# Patient Record
Sex: MALE | Race: WHITE | NOT HISPANIC OR LATINO | Employment: OTHER | URBAN - NONMETROPOLITAN AREA
[De-identification: names, ages, dates, MRNs, and addresses within clinical notes are randomized per-mention and may not be internally consistent; named-entity substitution may affect disease eponyms.]

---

## 2022-04-04 ENCOUNTER — APPOINTMENT (EMERGENCY)
Dept: CT IMAGING | Facility: HOSPITAL | Age: 82
DRG: 329 | End: 2022-04-04
Payer: MEDICARE

## 2022-04-04 ENCOUNTER — HOSPITAL ENCOUNTER (INPATIENT)
Facility: HOSPITAL | Age: 82
LOS: 7 days | Discharge: HOME HEALTH | DRG: 329 | End: 2022-04-11
Attending: STUDENT IN AN ORGANIZED HEALTH CARE EDUCATION/TRAINING PROGRAM | Admitting: INTERNAL MEDICINE
Payer: MEDICARE

## 2022-04-04 DIAGNOSIS — E43 SEVERE PROTEIN-CALORIE MALNUTRITION (HCC): ICD-10-CM

## 2022-04-04 DIAGNOSIS — K56.609 LARGE BOWEL OBSTRUCTION (HCC): ICD-10-CM

## 2022-04-04 DIAGNOSIS — K63.89 COLONIC MASS: Primary | ICD-10-CM

## 2022-04-04 PROBLEM — D64.9 ANEMIA: Status: ACTIVE | Noted: 2022-04-04

## 2022-04-04 PROBLEM — R79.89 ELEVATED SERUM CREATININE: Status: ACTIVE | Noted: 2022-04-04

## 2022-04-04 LAB
ALBUMIN SERPL BCP-MCNC: 3.2 G/DL (ref 3.5–5)
ALP SERPL-CCNC: 120 U/L (ref 46–116)
ALT SERPL W P-5'-P-CCNC: 17 U/L (ref 12–78)
ANION GAP SERPL CALCULATED.3IONS-SCNC: 9 MMOL/L (ref 4–13)
AST SERPL W P-5'-P-CCNC: 18 U/L (ref 5–45)
BACTERIA UR QL AUTO: ABNORMAL /HPF
BASOPHILS # BLD AUTO: 0.02 THOUSANDS/ΜL (ref 0–0.1)
BASOPHILS NFR BLD AUTO: 0 % (ref 0–1)
BILIRUB SERPL-MCNC: 0.37 MG/DL (ref 0.2–1)
BILIRUB UR QL STRIP: NEGATIVE
BUN SERPL-MCNC: 21 MG/DL (ref 5–25)
CALCIUM ALBUM COR SERPL-MCNC: 9.1 MG/DL (ref 8.3–10.1)
CALCIUM SERPL-MCNC: 8.5 MG/DL (ref 8.3–10.1)
CHLORIDE SERPL-SCNC: 100 MMOL/L (ref 100–108)
CLARITY UR: ABNORMAL
CO2 SERPL-SCNC: 28 MMOL/L (ref 21–32)
COLOR UR: YELLOW
CREAT SERPL-MCNC: 1.39 MG/DL (ref 0.6–1.3)
EOSINOPHIL # BLD AUTO: 0.01 THOUSAND/ΜL (ref 0–0.61)
EOSINOPHIL NFR BLD AUTO: 0 % (ref 0–6)
ERYTHROCYTE [DISTWIDTH] IN BLOOD BY AUTOMATED COUNT: 14.7 % (ref 11.6–15.1)
GFR SERPL CREATININE-BSD FRML MDRD: 47 ML/MIN/1.73SQ M
GLUCOSE SERPL-MCNC: 118 MG/DL (ref 65–140)
GLUCOSE UR STRIP-MCNC: NEGATIVE MG/DL
HCT VFR BLD AUTO: 34.6 % (ref 36.5–49.3)
HGB BLD-MCNC: 11.4 G/DL (ref 12–17)
HGB UR QL STRIP.AUTO: ABNORMAL
IMM GRANULOCYTES # BLD AUTO: 0.04 THOUSAND/UL (ref 0–0.2)
IMM GRANULOCYTES NFR BLD AUTO: 1 % (ref 0–2)
KETONES UR STRIP-MCNC: ABNORMAL MG/DL
LEUKOCYTE ESTERASE UR QL STRIP: NEGATIVE
LIPASE SERPL-CCNC: 91 U/L (ref 73–393)
LYMPHOCYTES # BLD AUTO: 0.43 THOUSANDS/ΜL (ref 0.6–4.47)
LYMPHOCYTES NFR BLD AUTO: 6 % (ref 14–44)
MAGNESIUM SERPL-MCNC: 1.8 MG/DL (ref 1.6–2.6)
MCH RBC QN AUTO: 29.1 PG (ref 26.8–34.3)
MCHC RBC AUTO-ENTMCNC: 32.9 G/DL (ref 31.4–37.4)
MCV RBC AUTO: 88 FL (ref 82–98)
MONOCYTES # BLD AUTO: 0.87 THOUSAND/ΜL (ref 0.17–1.22)
MONOCYTES NFR BLD AUTO: 12 % (ref 4–12)
NEUTROPHILS # BLD AUTO: 5.94 THOUSANDS/ΜL (ref 1.85–7.62)
NEUTS SEG NFR BLD AUTO: 81 % (ref 43–75)
NITRITE UR QL STRIP: POSITIVE
NON-SQ EPI CELLS URNS QL MICRO: ABNORMAL /HPF
NRBC BLD AUTO-RTO: 0 /100 WBCS
PH UR STRIP.AUTO: 7 [PH]
PHOSPHATE SERPL-MCNC: 2.9 MG/DL (ref 2.3–4.1)
PLATELET # BLD AUTO: 291 THOUSANDS/UL (ref 149–390)
PMV BLD AUTO: 8.5 FL (ref 8.9–12.7)
POTASSIUM SERPL-SCNC: 3.5 MMOL/L (ref 3.5–5.3)
PROT SERPL-MCNC: 7.3 G/DL (ref 6.4–8.2)
PROT UR STRIP-MCNC: NEGATIVE MG/DL
RBC # BLD AUTO: 3.92 MILLION/UL (ref 3.88–5.62)
RBC #/AREA URNS AUTO: ABNORMAL /HPF
SODIUM SERPL-SCNC: 137 MMOL/L (ref 136–145)
SP GR UR STRIP.AUTO: 1.01 (ref 1–1.03)
TSH SERPL DL<=0.05 MIU/L-ACNC: 2.97 UIU/ML (ref 0.45–4.5)
UROBILINOGEN UR QL STRIP.AUTO: 0.2 E.U./DL
WBC # BLD AUTO: 7.31 THOUSAND/UL (ref 4.31–10.16)
WBC #/AREA URNS AUTO: ABNORMAL /HPF

## 2022-04-04 PROCEDURE — 81001 URINALYSIS AUTO W/SCOPE: CPT | Performed by: STUDENT IN AN ORGANIZED HEALTH CARE EDUCATION/TRAINING PROGRAM

## 2022-04-04 PROCEDURE — 84100 ASSAY OF PHOSPHORUS: CPT | Performed by: STUDENT IN AN ORGANIZED HEALTH CARE EDUCATION/TRAINING PROGRAM

## 2022-04-04 PROCEDURE — 96365 THER/PROPH/DIAG IV INF INIT: CPT

## 2022-04-04 PROCEDURE — 83550 IRON BINDING TEST: CPT

## 2022-04-04 PROCEDURE — 80053 COMPREHEN METABOLIC PANEL: CPT | Performed by: STUDENT IN AN ORGANIZED HEALTH CARE EDUCATION/TRAINING PROGRAM

## 2022-04-04 PROCEDURE — 99285 EMERGENCY DEPT VISIT HI MDM: CPT

## 2022-04-04 PROCEDURE — 83690 ASSAY OF LIPASE: CPT | Performed by: STUDENT IN AN ORGANIZED HEALTH CARE EDUCATION/TRAINING PROGRAM

## 2022-04-04 PROCEDURE — 83540 ASSAY OF IRON: CPT

## 2022-04-04 PROCEDURE — 82746 ASSAY OF FOLIC ACID SERUM: CPT

## 2022-04-04 PROCEDURE — 82728 ASSAY OF FERRITIN: CPT

## 2022-04-04 PROCEDURE — 99223 1ST HOSP IP/OBS HIGH 75: CPT

## 2022-04-04 PROCEDURE — 93005 ELECTROCARDIOGRAM TRACING: CPT

## 2022-04-04 PROCEDURE — 36415 COLL VENOUS BLD VENIPUNCTURE: CPT | Performed by: STUDENT IN AN ORGANIZED HEALTH CARE EDUCATION/TRAINING PROGRAM

## 2022-04-04 PROCEDURE — 84443 ASSAY THYROID STIM HORMONE: CPT | Performed by: STUDENT IN AN ORGANIZED HEALTH CARE EDUCATION/TRAINING PROGRAM

## 2022-04-04 PROCEDURE — 85025 COMPLETE CBC W/AUTO DIFF WBC: CPT | Performed by: STUDENT IN AN ORGANIZED HEALTH CARE EDUCATION/TRAINING PROGRAM

## 2022-04-04 PROCEDURE — 74177 CT ABD & PELVIS W/CONTRAST: CPT

## 2022-04-04 PROCEDURE — G1004 CDSM NDSC: HCPCS

## 2022-04-04 PROCEDURE — 83735 ASSAY OF MAGNESIUM: CPT | Performed by: STUDENT IN AN ORGANIZED HEALTH CARE EDUCATION/TRAINING PROGRAM

## 2022-04-04 PROCEDURE — 82607 VITAMIN B-12: CPT

## 2022-04-04 PROCEDURE — 1124F ACP DISCUSS-NO DSCNMKR DOCD: CPT | Performed by: STUDENT IN AN ORGANIZED HEALTH CARE EDUCATION/TRAINING PROGRAM

## 2022-04-04 PROCEDURE — 99285 EMERGENCY DEPT VISIT HI MDM: CPT | Performed by: STUDENT IN AN ORGANIZED HEALTH CARE EDUCATION/TRAINING PROGRAM

## 2022-04-04 RX ORDER — SODIUM CHLORIDE, SODIUM GLUCONATE, SODIUM ACETATE, POTASSIUM CHLORIDE, MAGNESIUM CHLORIDE, SODIUM PHOSPHATE, DIBASIC, AND POTASSIUM PHOSPHATE .53; .5; .37; .037; .03; .012; .00082 G/100ML; G/100ML; G/100ML; G/100ML; G/100ML; G/100ML; G/100ML
1000 INJECTION, SOLUTION INTRAVENOUS ONCE
Status: COMPLETED | OUTPATIENT
Start: 2022-04-04 | End: 2022-04-04

## 2022-04-04 RX ORDER — HEPARIN SODIUM 5000 [USP'U]/ML
5000 INJECTION, SOLUTION INTRAVENOUS; SUBCUTANEOUS EVERY 8 HOURS SCHEDULED
Status: DISCONTINUED | OUTPATIENT
Start: 2022-04-05 | End: 2022-04-11 | Stop reason: HOSPADM

## 2022-04-04 RX ORDER — SODIUM CHLORIDE 9 MG/ML
50 INJECTION, SOLUTION INTRAVENOUS CONTINUOUS
Status: DISCONTINUED | OUTPATIENT
Start: 2022-04-05 | End: 2022-04-05

## 2022-04-04 RX ADMIN — IOHEXOL 100 ML: 350 INJECTION, SOLUTION INTRAVENOUS at 21:35

## 2022-04-04 RX ADMIN — SODIUM CHLORIDE, SODIUM GLUCONATE, SODIUM ACETATE, POTASSIUM CHLORIDE, MAGNESIUM CHLORIDE, SODIUM PHOSPHATE, DIBASIC, AND POTASSIUM PHOSPHATE 1000 ML: .53; .5; .37; .037; .03; .012; .00082 INJECTION, SOLUTION INTRAVENOUS at 20:40

## 2022-04-04 NOTE — ED PROVIDER NOTES
History  Chief Complaint   Patient presents with    Loss of Appetite     Pt not eating for the last three weeks because he gets the "dry heaves" every time he eats  Denies CP, SOB, N/V/D and constipation  Pt lives with son  Son states pt sleeps most of the day and is tired all the time  History provided by:  Patient and relative  Malaise - 7 years or greater  Severity:  Moderate  Onset quality:  Gradual  Duration:  3 weeks  Timing:  Constant  Progression:  Worsening  Chronicity:  New  Context: dehydration    Context: not alcohol use, not change in medication, not decreased sleep, not increased activity, not pinched nerve, not recent infection, not stress and not urinary tract infection    Relieved by:  Nothing  Worsened by:  Nothing  Ineffective treatments:  None tried  Associated symptoms: anorexia, lethargy, myalgias, nausea and vomiting    Associated symptoms: no abdominal pain, no arthralgias, no chest pain, no cough, no diarrhea, no difficulty walking, no dizziness, no dysphagia, no dysuria, no falls, no fever, no foul-smelling urine, no frequency, no headaches, no melena, no shortness of breath, no syncope and no urgency       81year old M  No known past medical history  Does not follow with a primary care provider  He states that for the past 3 weeks, he has been having persistent decreased appetite, dry heaving  Patient lives with his son  The patient adds that he has been having worsening weight loss over the last 3 weeks along with worsening fatigue/lethargy  Denies bloody stool, abdominal pain, fever/chills, nasal congestion, sore throat, rhinorrhea, dysuria  The patient's fluid intake and urine output have been poor over the last 3 weeks as well  History reviewed  No pertinent past medical history  Past Surgical History:   Procedure Laterality Date    HERNIA REPAIR         History reviewed  No pertinent family history    I have reviewed and agree with the history as documented  E-Cigarette/Vaping     E-Cigarette/Vaping Substances     Social History     Tobacco Use    Smoking status: Never Smoker    Smokeless tobacco: Never Used   Substance Use Topics    Alcohol use: Not on file    Drug use: Never       Review of Systems   Constitutional: Positive for activity change, appetite change and unexpected weight change  Negative for chills and fever  HENT: Negative for congestion, rhinorrhea, sinus pressure and sinus pain  Respiratory: Negative for cough, chest tightness and shortness of breath  Cardiovascular: Negative for chest pain and syncope  Gastrointestinal: Positive for anorexia, nausea and vomiting  Negative for abdominal pain, diarrhea, dysphagia and melena  Genitourinary: Positive for decreased urine volume  Negative for difficulty urinating, dysuria, flank pain, frequency and urgency  Musculoskeletal: Positive for myalgias  Negative for arthralgias, falls, neck pain and neck stiffness  Skin: Negative for color change, pallor, rash and wound  Neurological: Negative for dizziness, syncope, light-headedness and headaches  Hematological: Does not bruise/bleed easily  Psychiatric/Behavioral: Negative for confusion  All other systems reviewed and are negative  Physical Exam  Physical Exam  Vitals and nursing note reviewed  Constitutional:       General: He is not in acute distress  Appearance: He is not toxic-appearing  Comments: Chronically ill-appearing   HENT:      Head: Normocephalic and atraumatic  Right Ear: External ear normal       Left Ear: External ear normal       Nose: No congestion or rhinorrhea  Mouth/Throat:      Mouth: Mucous membranes are moist       Pharynx: Oropharynx is clear  No oropharyngeal exudate or posterior oropharyngeal erythema  Eyes:      General:         Right eye: No discharge  Left eye: No discharge  Extraocular Movements: Extraocular movements intact        Conjunctiva/sclera: Conjunctivae normal       Pupils: Pupils are equal, round, and reactive to light  Cardiovascular:      Rate and Rhythm: Normal rate  Rhythm irregular  Pulses: Normal pulses  Heart sounds: Normal heart sounds  Pulmonary:      Effort: Pulmonary effort is normal  No respiratory distress  Breath sounds: Normal breath sounds  No stridor  No wheezing, rhonchi or rales  Chest:      Chest wall: No tenderness  Abdominal:      General: Abdomen is flat  Bowel sounds are normal       Palpations: Abdomen is soft  Tenderness: There is abdominal tenderness  There is no right CVA tenderness, left CVA tenderness, guarding or rebound  Comments: Mild tenderness to palpation along the epigastrium  Normoactive bowel sounds  Musculoskeletal:         General: No swelling or tenderness  Normal range of motion  Cervical back: Neck supple  No tenderness  Skin:     General: Skin is warm and dry  Capillary Refill: Capillary refill takes less than 2 seconds  Coloration: Skin is not jaundiced or pale  Findings: No bruising, erythema, lesion or rash  Neurological:      General: No focal deficit present  Mental Status: He is alert and oriented to person, place, and time  Mental status is at baseline  Cranial Nerves: No cranial nerve deficit  Sensory: No sensory deficit  Motor: No weakness  Psychiatric:         Mood and Affect: Mood normal          Behavior: Behavior normal          Thought Content:  Thought content normal          Judgment: Judgment normal        Vital Signs  ED Triage Vitals   Temperature Pulse Respirations Blood Pressure SpO2   04/04/22 1923 04/04/22 1923 04/04/22 1923 04/04/22 1925 04/04/22 1923   98 °F (36 7 °C) 84 16 157/84 100 %      Temp Source Heart Rate Source Patient Position - Orthostatic VS BP Location FiO2 (%)   04/04/22 1923 04/04/22 1923 04/04/22 1923 04/04/22 1923 --   Temporal Monitor Sitting Right arm       Pain Score       04/04/22 1923       No Pain         Vitals:    04/04/22 1923 04/04/22 1925   BP:  157/84   Pulse: 84    Patient Position - Orthostatic VS: Sitting      ED Medications  Medications   multi-electrolyte (ISOLYTE-S PH 7 4) bolus 1,000 mL (0 mL Intravenous Stopped 4/4/22 2202)   iohexol (OMNIPAQUE) 350 MG/ML injection (SINGLE-DOSE) 100 mL (100 mL Intravenous Given 4/4/22 2135)     Diagnostic Studies  Results Reviewed     Procedure Component Value Units Date/Time    Urine Microscopic [[id number]]  (Abnormal) Collected: 04/04/22 2205    Lab Status: Final result Specimen: Urine, Clean Catch Updated: 04/04/22 2238     RBC, UA 10-20 /hpf      WBC, UA None Seen /hpf      Epithelial Cells None Seen /hpf      Bacteria, UA Occasional /hpf     UA w Reflex to Microscopic w Reflex to Culture [[id number]]  (Abnormal) Collected: 04/04/22 2205    Lab Status: Final result Specimen: Urine, Clean Catch Updated: 04/04/22 2222     Color, UA Yellow     Clarity, UA Slightly Cloudy     Specific [place], UA 1 010     pH, UA 7 0     Leukocytes, UA Negative     Nitrite, UA Positive     Protein, UA Negative mg/dl      Glucose, UA Negative mg/dl      Ketones, UA Trace mg/dl      Urobilinogen, UA 0 2 E U /dl      Bilirubin, UA Negative     Blood, UA Small    Comprehensive metabolic panel [[id number]]  (Abnormal) Collected: 04/04/22 2031    Lab Status: Final result Specimen: Blood from Arm, Right Updated: 04/04/22 2102     Sodium 137 mmol/L      Potassium 3 5 mmol/L      Chloride 100 mmol/L      CO2 28 mmol/L      ANION GAP 9 mmol/L      BUN 21 mg/dL      Creatinine 1 39 mg/dL      Glucose 118 mg/dL      Calcium 8 5 mg/dL      Corrected Calcium 9 1 mg/dL      AST 18 U/L      ALT 17 U/L      Alkaline Phosphatase 120 U/L      Total Protein 7 3 g/dL      Albumin 3 2 g/dL      Total Bilirubin 0 37 mg/dL      eGFR 47 ml/min/1 73sq m     Narrative:      [place] guidelines for Chronic Kidney Disease (CKD):     Stage 1 with normal or high GFR (GFR > 90 mL/min/1 73 square meters)    Stage 2 Mild CKD (GFR = 60-89 mL/min/1 73 square meters)    Stage 3A Moderate CKD (GFR = 45-59 mL/min/1 73 square meters)    Stage 3B Moderate CKD (GFR = 30-44 mL/min/1 73 square meters)    Stage 4 Severe CKD (GFR = 15-29 mL/min/1 73 square meters)    Stage 5 End Stage CKD (GFR <15 mL/min/1 73 square meters)  Note: GFR calculation is accurate only with a steady state creatinine    Phosphorus [[id number]]  (Normal) Collected: 04/04/22 2031    Lab Status: Final result Specimen: Blood from Arm, Right Updated: 04/04/22 2102     Phosphorus 2 9 mg/dL     Lipase [[id number]]  (Normal) Collected: 04/04/22 2031    Lab Status: Final result Specimen: Blood from Arm, Right Updated: 04/04/22 2102     Lipase 91 u/L     TSH, 3rd generation with Free T4 reflex [[id number]]  (Normal) Collected: 04/04/22 2031    Lab Status: Final result Specimen: Blood from Arm, Right Updated: 04/04/22 2102     TSH 3RD GENERATON 2 971 uIU/mL     Narrative:      Patients undergoing fluorescein dye angiography may retain small amounts of fluorescein in the body for 48-72 hours post procedure  Samples containing fluorescein can produce falsely depressed TSH values  If the patient had this procedure,a specimen should be resubmitted post fluorescein clearance        Magnesium [[id number]]  (Normal) Collected: 04/04/22 2031    Lab Status: Final result Specimen: Blood from Arm, Right Updated: 04/04/22 2102     Magnesium 1 8 mg/dL     CBC and differential [[id number]]  (Abnormal) Collected: 04/04/22 2031    Lab Status: Final result Specimen: Blood from Arm, Right Updated: 04/04/22 2037     WBC 7 31 Thousand/uL      RBC 3 92 Million/uL      Hemoglobin 11 4 g/dL      Hematocrit 34 6 %      MCV 88 fL      MCH 29 1 pg      MCHC 32 9 g/dL      RDW 14 7 %      MPV 8 5 fL      Platelets 229 Thousands/uL      nRBC 0 /100 WBCs      Neutrophils Relative 81 %      Immat GRANS % 1 %      Lymphocytes Relative 6 %      Monocytes Relative 12 %      Eosinophils Relative 0 %      Basophils Relative 0 %      Neutrophils Absolute 5 94 Thousands/µL      Immature Grans Absolute 0 04 Thousand/uL      Lymphocytes Absolute 0 43 Thousands/µL      Monocytes Absolute 0 87 Thousand/µL      Eosinophils Absolute 0 01 Thousand/µL      Basophils Absolute 0 02 Thousands/µL              CT abdomen pelvis with contrast   Final Result by Marimar Tovar MD (04/04 2215)         1  Colonic mass involving the hepatic flexure where there is marked circumferential wall thickening and significant luminal narrowing, with multiple fluid-filled dilated loops of distal small bowel, and fluid distended proximal colon compatible with    obstruction  No pneumatosis or free air  No definite metastatic disease  2   Haziness involving the omentum in the right upper quadrant adjacent to the mass, which is nonspecific but is concerning for localized carcinomatosis      The study was marked in EPIC for immediate notification  Workstation performed: [id number]                Procedures  ECG 12 Lead Documentation Only    Date/Time: 4/4/2022 8:16 PM  Performed by: Tiffanie Alvarez DO  Authorized by: Tiffanie Alvarez DO     Indications / Diagnosis:  Irregular heartbeat  ECG reviewed by me, the ED Provider: yes    Patient location:  ED  Previous ECG:     Previous ECG:  Unavailable  Interpretation:     Interpretation: non-specific    Rate:     ECG rate:  90    ECG rate assessment: normal    Rhythm:     Rhythm: sinus rhythm    Ectopy:     Ectopy: PVCs      PVCs:  Infrequent  QRS:     QRS axis:  Normal    QRS intervals:  Normal  Conduction:     Conduction: normal    ST segments:     ST segments:  Normal  T waves:     T waves: normal           ED Course  ED Course as of 04/05/22 0036   Mon Apr 04, 2022 2232 Discussed the case with Dr Anastasiia IZQUIERDO to admit to medical service with surgical consult  MDM     28-year-old male    Presents to the emergency department with weight loss, decreased oral intake, loss of appetite  Does not follow regularly with a PCP  Has never had a colonoscopy  Workup significant for a colonic mass with obstruction  Possible carcinomatosis  The case was discussed with General surgery (Dr Ruma Bergeron)  The patient was subsequently admitted to IM with GS consult  The patient was stable while under my care  Disposition  Final diagnoses:   Colonic mass   Large bowel obstruction ([address] )     Time reflects when diagnosis was documented in both MDM as applicable and the Disposition within this note     Time User Action Codes Description Comment    4/4/2022 11:08 PM Duncan Cabrales Add [K63 89] Colonic mass     4/4/2022 11:08 PM Duncan Cabrales Add [A66 022] Large bowel obstruction [facility])       ED Disposition     ED Disposition Condition Date/Time Comment    Admit Stable Mon Apr 4, 2022 11:09 PM Case was discussed with Lenore Rangel and the patient's admission status was agreed to be Admission Status: inpatient status to the service of Dr Nick Orantes   Follow-up Information    None         Patient's Medications    No medications on file       No discharge procedures on file      PDMP Review     None          ED Provider  Electronically Signed by           Noah Landeros DO  04/05/22 6068

## 2022-04-05 ENCOUNTER — APPOINTMENT (INPATIENT)
Dept: CT IMAGING | Facility: HOSPITAL | Age: 82
DRG: 329 | End: 2022-04-05
Payer: MEDICARE

## 2022-04-05 ENCOUNTER — ANESTHESIA EVENT (INPATIENT)
Dept: PERIOP | Facility: HOSPITAL | Age: 82
DRG: 329 | End: 2022-04-05
Payer: MEDICARE

## 2022-04-05 ENCOUNTER — ANESTHESIA (INPATIENT)
Dept: PERIOP | Facility: HOSPITAL | Age: 82
DRG: 329 | End: 2022-04-05
Payer: MEDICARE

## 2022-04-05 PROBLEM — E43 SEVERE PROTEIN-CALORIE MALNUTRITION (HCC): Status: ACTIVE | Noted: 2022-04-05

## 2022-04-05 PROBLEM — K56.609 SMALL BOWEL OBSTRUCTION (HCC): Status: ACTIVE | Noted: 2022-04-04

## 2022-04-05 PROBLEM — N17.9 AKI (ACUTE KIDNEY INJURY) (HCC): Status: ACTIVE | Noted: 2022-04-04

## 2022-04-05 PROBLEM — E46 MALNUTRITION (HCC): Status: ACTIVE | Noted: 2022-04-05

## 2022-04-05 LAB
ABO GROUP BLD: NORMAL
ALBUMIN SERPL BCP-MCNC: 2.9 G/DL (ref 3.5–5)
ALP SERPL-CCNC: 105 U/L (ref 46–116)
ALT SERPL W P-5'-P-CCNC: 19 U/L (ref 12–78)
ANION GAP SERPL CALCULATED.3IONS-SCNC: 7 MMOL/L (ref 4–13)
AST SERPL W P-5'-P-CCNC: 21 U/L (ref 5–45)
ATRIAL RATE: 90 BPM
ATRIAL RATE: 92 BPM
BASOPHILS # BLD AUTO: 0.03 THOUSANDS/ΜL (ref 0–0.1)
BASOPHILS NFR BLD AUTO: 0 % (ref 0–1)
BILIRUB SERPL-MCNC: 0.34 MG/DL (ref 0.2–1)
BLD GP AB SCN SERPL QL: NEGATIVE
BUN SERPL-MCNC: 17 MG/DL (ref 5–25)
CALCIUM ALBUM COR SERPL-MCNC: 8.7 MG/DL (ref 8.3–10.1)
CALCIUM SERPL-MCNC: 7.8 MG/DL (ref 8.3–10.1)
CEA SERPL-MCNC: 3.5 NG/ML (ref 0–3)
CHLORIDE SERPL-SCNC: 103 MMOL/L (ref 100–108)
CO2 SERPL-SCNC: 27 MMOL/L (ref 21–32)
CREAT SERPL-MCNC: 1.02 MG/DL (ref 0.6–1.3)
EOSINOPHIL # BLD AUTO: 0.04 THOUSAND/ΜL (ref 0–0.61)
EOSINOPHIL NFR BLD AUTO: 1 % (ref 0–6)
ERYTHROCYTE [DISTWIDTH] IN BLOOD BY AUTOMATED COUNT: 14.6 % (ref 11.6–15.1)
FERRITIN SERPL-MCNC: 15 NG/ML (ref 8–388)
FOLATE SERPL-MCNC: >20 NG/ML (ref 3.1–17.5)
GFR SERPL CREATININE-BSD FRML MDRD: 68 ML/MIN/1.73SQ M
GLUCOSE SERPL-MCNC: 101 MG/DL (ref 65–140)
HCT VFR BLD AUTO: 31.5 % (ref 36.5–49.3)
HGB BLD-MCNC: 10.3 G/DL (ref 12–17)
IMM GRANULOCYTES # BLD AUTO: 0.04 THOUSAND/UL (ref 0–0.2)
IMM GRANULOCYTES NFR BLD AUTO: 1 % (ref 0–2)
IRON SATN MFR SERPL: 10 % (ref 20–50)
IRON SERPL-MCNC: 42 UG/DL (ref 65–175)
LYMPHOCYTES # BLD AUTO: 0.63 THOUSANDS/ΜL (ref 0.6–4.47)
LYMPHOCYTES NFR BLD AUTO: 7 % (ref 14–44)
MCH RBC QN AUTO: 28.9 PG (ref 26.8–34.3)
MCHC RBC AUTO-ENTMCNC: 32.7 G/DL (ref 31.4–37.4)
MCV RBC AUTO: 89 FL (ref 82–98)
MONOCYTES # BLD AUTO: 1.29 THOUSAND/ΜL (ref 0.17–1.22)
MONOCYTES NFR BLD AUTO: 15 % (ref 4–12)
NEUTROPHILS # BLD AUTO: 6.76 THOUSANDS/ΜL (ref 1.85–7.62)
NEUTS SEG NFR BLD AUTO: 76 % (ref 43–75)
NRBC BLD AUTO-RTO: 0 /100 WBCS
P AXIS: 64 DEGREES
P AXIS: 80 DEGREES
PLATELET # BLD AUTO: 257 THOUSANDS/UL (ref 149–390)
PMV BLD AUTO: 8.6 FL (ref 8.9–12.7)
POTASSIUM SERPL-SCNC: 3.3 MMOL/L (ref 3.5–5.3)
PR INTERVAL: 136 MS
PR INTERVAL: 150 MS
PROT SERPL-MCNC: 6.6 G/DL (ref 6.4–8.2)
QRS AXIS: 35 DEGREES
QRS AXIS: 56 DEGREES
QRSD INTERVAL: 72 MS
QRSD INTERVAL: 74 MS
QT INTERVAL: 382 MS
QT INTERVAL: 390 MS
QTC INTERVAL: 467 MS
QTC INTERVAL: 482 MS
RBC # BLD AUTO: 3.56 MILLION/UL (ref 3.88–5.62)
RH BLD: POSITIVE
SODIUM SERPL-SCNC: 137 MMOL/L (ref 136–145)
SPECIMEN EXPIRATION DATE: NORMAL
T WAVE AXIS: 31 DEGREES
T WAVE AXIS: 63 DEGREES
TIBC SERPL-MCNC: 424 UG/DL (ref 250–450)
VENTRICULAR RATE: 90 BPM
VENTRICULAR RATE: 92 BPM
VIT B12 SERPL-MCNC: 274 PG/ML (ref 100–900)
WBC # BLD AUTO: 8.79 THOUSAND/UL (ref 4.31–10.16)

## 2022-04-05 PROCEDURE — 80053 COMPREHEN METABOLIC PANEL: CPT

## 2022-04-05 PROCEDURE — 88309 TISSUE EXAM BY PATHOLOGIST: CPT | Performed by: PATHOLOGY

## 2022-04-05 PROCEDURE — 99233 SBSQ HOSP IP/OBS HIGH 50: CPT | Performed by: FAMILY MEDICINE

## 2022-04-05 PROCEDURE — 82378 CARCINOEMBRYONIC ANTIGEN: CPT | Performed by: FAMILY MEDICINE

## 2022-04-05 PROCEDURE — 86900 BLOOD TYPING SEROLOGIC ABO: CPT | Performed by: ANESTHESIOLOGY

## 2022-04-05 PROCEDURE — 85025 COMPLETE CBC W/AUTO DIFF WBC: CPT

## 2022-04-05 PROCEDURE — 86850 RBC ANTIBODY SCREEN: CPT | Performed by: ANESTHESIOLOGY

## 2022-04-05 PROCEDURE — 71250 CT THORAX DX C-: CPT

## 2022-04-05 PROCEDURE — G1004 CDSM NDSC: HCPCS

## 2022-04-05 PROCEDURE — 88342 IMHCHEM/IMCYTCHM 1ST ANTB: CPT | Performed by: PATHOLOGY

## 2022-04-05 PROCEDURE — 44160 REMOVAL OF COLON: CPT | Performed by: PHYSICIAN ASSISTANT

## 2022-04-05 PROCEDURE — 93005 ELECTROCARDIOGRAM TRACING: CPT

## 2022-04-05 PROCEDURE — 86901 BLOOD TYPING SEROLOGIC RH(D): CPT | Performed by: ANESTHESIOLOGY

## 2022-04-05 PROCEDURE — 88341 IMHCHEM/IMCYTCHM EA ADD ANTB: CPT | Performed by: PATHOLOGY

## 2022-04-05 PROCEDURE — 82272 OCCULT BLD FECES 1-3 TESTS: CPT

## 2022-04-05 PROCEDURE — 0DTF0ZZ RESECTION OF RIGHT LARGE INTESTINE, OPEN APPROACH: ICD-10-PCS | Performed by: STUDENT IN AN ORGANIZED HEALTH CARE EDUCATION/TRAINING PROGRAM

## 2022-04-05 PROCEDURE — NC001 PR NO CHARGE: Performed by: PHYSICIAN ASSISTANT

## 2022-04-05 RX ORDER — ONDANSETRON 2 MG/ML
4 INJECTION INTRAMUSCULAR; INTRAVENOUS EVERY 6 HOURS PRN
Status: DISCONTINUED | OUTPATIENT
Start: 2022-04-05 | End: 2022-04-11 | Stop reason: HOSPADM

## 2022-04-05 RX ORDER — CYANOCOBALAMIN 1000 UG/ML
1000 INJECTION INTRAMUSCULAR; SUBCUTANEOUS DAILY
Status: COMPLETED | OUTPATIENT
Start: 2022-04-05 | End: 2022-04-09

## 2022-04-05 RX ORDER — ONDANSETRON 2 MG/ML
4 INJECTION INTRAMUSCULAR; INTRAVENOUS ONCE AS NEEDED
Status: DISCONTINUED | OUTPATIENT
Start: 2022-04-05 | End: 2022-04-05 | Stop reason: HOSPADM

## 2022-04-05 RX ORDER — FENTANYL CITRATE/PF 50 MCG/ML
25 SYRINGE (ML) INJECTION
Status: DISCONTINUED | OUTPATIENT
Start: 2022-04-05 | End: 2022-04-05 | Stop reason: HOSPADM

## 2022-04-05 RX ORDER — SODIUM CHLORIDE 9 MG/ML
75 INJECTION, SOLUTION INTRAVENOUS CONTINUOUS
Status: DISCONTINUED | OUTPATIENT
Start: 2022-04-05 | End: 2022-04-08

## 2022-04-05 RX ORDER — LABETALOL 20 MG/4 ML (5 MG/ML) INTRAVENOUS SYRINGE
10
Status: DISCONTINUED | OUTPATIENT
Start: 2022-04-05 | End: 2022-04-05 | Stop reason: HOSPADM

## 2022-04-05 RX ORDER — CEFAZOLIN SODIUM 1 G/50ML
1000 SOLUTION INTRAVENOUS
Status: COMPLETED | OUTPATIENT
Start: 2022-04-05 | End: 2022-04-05

## 2022-04-05 RX ORDER — FENTANYL CITRATE 50 UG/ML
INJECTION, SOLUTION INTRAMUSCULAR; INTRAVENOUS AS NEEDED
Status: DISCONTINUED | OUTPATIENT
Start: 2022-04-05 | End: 2022-04-05

## 2022-04-05 RX ORDER — GLYCOPYRROLATE 0.2 MG/ML
INJECTION INTRAMUSCULAR; INTRAVENOUS AS NEEDED
Status: DISCONTINUED | OUTPATIENT
Start: 2022-04-05 | End: 2022-04-05

## 2022-04-05 RX ORDER — PROPOFOL 10 MG/ML
INJECTION, EMULSION INTRAVENOUS AS NEEDED
Status: DISCONTINUED | OUTPATIENT
Start: 2022-04-05 | End: 2022-04-05

## 2022-04-05 RX ORDER — LABETALOL 20 MG/4 ML (5 MG/ML) INTRAVENOUS SYRINGE
Status: COMPLETED
Start: 2022-04-05 | End: 2022-04-05

## 2022-04-05 RX ORDER — ONDANSETRON 2 MG/ML
INJECTION INTRAMUSCULAR; INTRAVENOUS AS NEEDED
Status: DISCONTINUED | OUTPATIENT
Start: 2022-04-05 | End: 2022-04-05

## 2022-04-05 RX ORDER — MORPHINE SULFATE 10 MG/ML
4 INJECTION, SOLUTION INTRAMUSCULAR; INTRAVENOUS
Status: DISCONTINUED | OUTPATIENT
Start: 2022-04-05 | End: 2022-04-07

## 2022-04-05 RX ORDER — LIDOCAINE HYDROCHLORIDE 10 MG/ML
INJECTION, SOLUTION EPIDURAL; INFILTRATION; INTRACAUDAL; PERINEURAL AS NEEDED
Status: DISCONTINUED | OUTPATIENT
Start: 2022-04-05 | End: 2022-04-05

## 2022-04-05 RX ORDER — NEOSTIGMINE METHYLSULFATE 1 MG/ML
INJECTION INTRAVENOUS AS NEEDED
Status: DISCONTINUED | OUTPATIENT
Start: 2022-04-05 | End: 2022-04-05

## 2022-04-05 RX ORDER — HYDROMORPHONE HCL/PF 1 MG/ML
0.4 SYRINGE (ML) INJECTION
Status: DISCONTINUED | OUTPATIENT
Start: 2022-04-05 | End: 2022-04-05 | Stop reason: HOSPADM

## 2022-04-05 RX ORDER — DEXAMETHASONE SODIUM PHOSPHATE 10 MG/ML
INJECTION, SOLUTION INTRAMUSCULAR; INTRAVENOUS AS NEEDED
Status: DISCONTINUED | OUTPATIENT
Start: 2022-04-05 | End: 2022-04-05

## 2022-04-05 RX ORDER — HYDROMORPHONE HCL 110MG/55ML
PATIENT CONTROLLED ANALGESIA SYRINGE INTRAVENOUS AS NEEDED
Status: DISCONTINUED | OUTPATIENT
Start: 2022-04-05 | End: 2022-04-05

## 2022-04-05 RX ORDER — BUPIVACAINE HYDROCHLORIDE AND EPINEPHRINE 2.5; 5 MG/ML; UG/ML
INJECTION, SOLUTION INFILTRATION; PERINEURAL AS NEEDED
Status: DISCONTINUED | OUTPATIENT
Start: 2022-04-05 | End: 2022-04-05 | Stop reason: HOSPADM

## 2022-04-05 RX ORDER — CEFAZOLIN SODIUM 2 G/50ML
2000 SOLUTION INTRAVENOUS EVERY 8 HOURS
Status: DISCONTINUED | OUTPATIENT
Start: 2022-04-05 | End: 2022-04-06

## 2022-04-05 RX ORDER — POTASSIUM CHLORIDE 14.9 MG/ML
20 INJECTION INTRAVENOUS ONCE
Status: COMPLETED | OUTPATIENT
Start: 2022-04-05 | End: 2022-04-05

## 2022-04-05 RX ORDER — ROCURONIUM BROMIDE 10 MG/ML
INJECTION, SOLUTION INTRAVENOUS AS NEEDED
Status: DISCONTINUED | OUTPATIENT
Start: 2022-04-05 | End: 2022-04-05

## 2022-04-05 RX ADMIN — PHENYLEPHRINE HYDROCHLORIDE 50 MCG/MIN: 10 INJECTION INTRAVENOUS at 15:39

## 2022-04-05 RX ADMIN — CYANOCOBALAMIN 1000 MCG: 1000 INJECTION INTRAMUSCULAR; SUBCUTANEOUS at 13:48

## 2022-04-05 RX ADMIN — FENTANYL CITRATE 50 MCG: 50 INJECTION, SOLUTION INTRAMUSCULAR; INTRAVENOUS at 15:51

## 2022-04-05 RX ADMIN — METRONIDAZOLE 500 MG: 500 INJECTION, SOLUTION INTRAVENOUS at 15:30

## 2022-04-05 RX ADMIN — ROCURONIUM BROMIDE 50 MG: 10 INJECTION, SOLUTION INTRAVENOUS at 15:22

## 2022-04-05 RX ADMIN — Medication 10 MG: at 17:35

## 2022-04-05 RX ADMIN — PROPOFOL 100 MG: 10 INJECTION, EMULSION INTRAVENOUS at 15:21

## 2022-04-05 RX ADMIN — GLYCOPYRROLATE 0.4 MG: 0.2 INJECTION, SOLUTION INTRAMUSCULAR; INTRAVENOUS at 16:51

## 2022-04-05 RX ADMIN — CEFAZOLIN SODIUM 1000 MG: 1 SOLUTION INTRAVENOUS at 15:30

## 2022-04-05 RX ADMIN — LABETALOL HYDROCHLORIDE 10 MG: 5 INJECTION, SOLUTION INTRAVENOUS at 17:35

## 2022-04-05 RX ADMIN — MORPHINE SULFATE 4 MG: 10 INJECTION INTRAVENOUS at 20:48

## 2022-04-05 RX ADMIN — SODIUM CHLORIDE 100 ML/HR: 0.9 INJECTION, SOLUTION INTRAVENOUS at 09:26

## 2022-04-05 RX ADMIN — Medication 10 MG: at 18:16

## 2022-04-05 RX ADMIN — ROCURONIUM BROMIDE 20 MG: 10 INJECTION, SOLUTION INTRAVENOUS at 16:33

## 2022-04-05 RX ADMIN — SODIUM CHLORIDE 100 ML/HR: 0.9 INJECTION, SOLUTION INTRAVENOUS at 00:20

## 2022-04-05 RX ADMIN — DEXAMETHASONE SODIUM PHOSPHATE 10 MG: 10 INJECTION, SOLUTION INTRAMUSCULAR; INTRAVENOUS at 15:22

## 2022-04-05 RX ADMIN — HEPARIN SODIUM 5000 UNITS: 5000 INJECTION INTRAVENOUS; SUBCUTANEOUS at 13:48

## 2022-04-05 RX ADMIN — Medication 10 MG: at 17:56

## 2022-04-05 RX ADMIN — HEPARIN SODIUM 5000 UNITS: 5000 INJECTION INTRAVENOUS; SUBCUTANEOUS at 05:18

## 2022-04-05 RX ADMIN — LIDOCAINE HYDROCHLORIDE 50 MG: 10 INJECTION, SOLUTION EPIDURAL; INFILTRATION; INTRACAUDAL; PERINEURAL at 15:21

## 2022-04-05 RX ADMIN — SODIUM CHLORIDE 75 ML/HR: 0.9 INJECTION, SOLUTION INTRAVENOUS at 18:15

## 2022-04-05 RX ADMIN — CEFAZOLIN SODIUM 2000 MG: 2 SOLUTION INTRAVENOUS at 23:19

## 2022-04-05 RX ADMIN — HEPARIN SODIUM 5000 UNITS: 5000 INJECTION INTRAVENOUS; SUBCUTANEOUS at 22:28

## 2022-04-05 RX ADMIN — HYDROMORPHONE HYDROCHLORIDE 1 MG: 2 INJECTION, SOLUTION INTRAMUSCULAR; INTRAVENOUS; SUBCUTANEOUS at 15:58

## 2022-04-05 RX ADMIN — METRONIDAZOLE 500 MG: 500 INJECTION, SOLUTION INTRAVENOUS at 22:26

## 2022-04-05 RX ADMIN — FENTANYL CITRATE 50 MCG: 50 INJECTION, SOLUTION INTRAMUSCULAR; INTRAVENOUS at 15:21

## 2022-04-05 RX ADMIN — NEOSTIGMINE METHYLSULFATE 3 MG: 1 INJECTION, SOLUTION INTRAVENOUS at 16:51

## 2022-04-05 RX ADMIN — POTASSIUM CHLORIDE 20 MEQ: 14.9 INJECTION, SOLUTION INTRAVENOUS at 10:29

## 2022-04-05 RX ADMIN — ONDANSETRON 4 MG: 2 INJECTION INTRAMUSCULAR; INTRAVENOUS at 16:52

## 2022-04-05 NOTE — ASSESSMENT & PLAN NOTE
· Hgb 11 2 on admission   · No baseline labs available  Hemoglobin dropped to 10 3 with hydration    Also due to underlying colonic malignancy  · Denies melena, hematochezia  · normal folate, low B12 and iron panel, occult stool pending  · Continue to trend CBC

## 2022-04-05 NOTE — ASSESSMENT & PLAN NOTE
· Presents from home with nausea, dry heaves,hiccups, loss of appetite and 15 lb weight loss over the last month  · Reports father with history of colon cancer diagnosed at age 80  · No prior colonoscopies or cancer history per patient and family  Denies medical/surgical history  Has not seen doctors or followed up with lab testing or screening test in years  · CT a/p w contrast: "Colonic mass involving the hepatic flexure where there is marked circumferential wall thickening and significant luminal narrowing with multiple fluid-filled dilated loops of since distal small bowel, and fluid distended proximal colon compatible with obstruction  No pneumatosis or free air  No definite metastatic disease  Haziness involving the omentum in the right upper quadrant adjacent to the mass which is nonspecific but is concerning for localized carcinomatosis "  · CT chest negative for malignancy  · Keep NPO   · IVF hydration   · Appreciate GI consult  · Discussed with patient at bedside and with daughter over the phone  Currently patient is agreeable for surgical intervention  Patient is medically cleared for surgery

## 2022-04-05 NOTE — CONSULTS
Consultation - [address] [place] [address] [facility]  Devendra Sanders  81 y o  male MRN: [id number]  Unit/Bed#: -01 Encounter: [id number]        Consults    Reason for Consult / Principal Problem:     Colonic mass  Large bowel obstruction  Unintentional weight loss  Severe malnutrition          ASSESSMENT AND PLAN:      Colonic mass  Large bowel obstruction  Unintentional weight loss  Severe malnutrition (weight loss >5% in 1 month, temporal wasting, reduced nutrition intake)  -overall picture is concerning for malignancy  -in light of large bowel obstruction he would be at high risk for perforation if colonoscopy were to be performed and unlikely to tolerate prep  -agree with CEA level, plan for CT chest to assess for metastasis  -will discuss with other teams and await family discussion to take place  -NPO for now    Normocytic anemia  -no baseline available  -may be nutritionally related versus chronic blood loss from potential malignancy  -B12, folate and iron levels pending  -continue monitor  -no signs of overt bleeding      ______________________________________________________________________    HPI:  81-year-old male seen in consultation for abdominal pain, unintentional weight loss, change in bowel habits in the setting of newly found colonic mass at the hepatic flexure causing obstruction with upstream dilation  Patient with complaints of 3 week history of worsening generalized abdominal pain, dry heaves, decreased appetite and 40 lb unintentional weight loss  He also notice a change in his bowel habits with more looser form but no blood  Unfortunately he does not seek out regular medical care  His currently not on any medications  He does not know of any prior cardiac or pulmonary diagnoses  He reports a history of a hernia repair over 15 years ago  He is a nonsmoker and denies any heavy alcohol use  He currently lives at home with his son    He reports a family history of colon cancer in father  Patient has never had a colonoscopy in the past       REVIEW OF SYSTEMS:    Review of Systems   Constitutional: Positive for activity change, appetite change and unexpected weight change  Respiratory: Negative for shortness of breath  Cardiovascular: Negative for chest pain  Gastrointestinal: Positive for abdominal distention, abdominal pain, diarrhea and nausea  Negative for blood in stool  Historical Information   History reviewed  No pertinent past medical history  Past Surgical History:   Procedure Laterality Date    HERNIA REPAIR       Social History   Social History     Substance and Sexual Activity   Alcohol Use Not Currently     Social History     Substance and Sexual Activity   Drug Use Never     Social History     Tobacco Use   Smoking Status Never Smoker   Smokeless Tobacco Never Used     History reviewed  No pertinent family history  Meds/Allergies     No medications prior to admission  Current Facility-Administered Medications   Medication Dose Route Frequency    heparin (porcine) subcutaneous injection 5,000 Units  5,000 Units Subcutaneous Q8H [facility]    morphine injection 2 mg  2 mg Intravenous Q4H PRN    ondansetron (ZOFRAN) injection 4 mg  4 mg Intravenous Q6H PRN    sodium chloride 0 9 % infusion  100 mL/hr Intravenous Continuous       No Known Allergies        Objective     Blood pressure 147/89, pulse 93, temperature 98 1 °F (36 7 °C), temperature source Oral, resp  rate 16, height 5' 6" (1 676 m), weight 53 7 kg (118 lb 6 4 oz), SpO2 97 %  Body mass index is 19 11 kg/m²  Intake/Output Summary (Last 24 hours) at 4/5/2022 0940  Last data filed at 4/5/2022 8231  Gross per 24 hour   Intake 1000 ml   Output 2215 ml   Net -1215 ml         PHYSICAL EXAM:      Physical Exam  Constitutional:       General: He is not in acute distress  Appearance: He is ill-appearing     HENT:      Head:      Comments: Temporal wasting  Eyes:      General: No scleral icterus  Cardiovascular:      Rate and Rhythm: Normal rate  Pulmonary:      Effort: Pulmonary effort is normal    Abdominal:      General: There is no distension  Palpations: Abdomen is soft  There is no mass  Tenderness: There is abdominal tenderness  There is no guarding  Musculoskeletal:      Cervical back: Neck supple  Right lower leg: No edema  Left lower leg: No edema  Skin:     Coloration: Skin is not jaundiced  Neurological:      Mental Status: He is alert and oriented to person, place, and time     Psychiatric:         Mood and Affect: Mood normal              Lab Results:   Admission on 04/04/2022   Component Date Value    WBC 04/04/2022 7 31     RBC 04/04/2022 3 92     Hemoglobin 04/04/2022 11 4*    Hematocrit 04/04/2022 34 6*    MCV 04/04/2022 88     MCH 04/04/2022 29 1     MCHC 04/04/2022 32 9     RDW 04/04/2022 14 7     MPV 04/04/2022 8 5*    Platelets 18/78/6450 291     nRBC 04/04/2022 0     Neutrophils Relative 04/04/2022 81*    Immat GRANS % 04/04/2022 1     Lymphocytes Relative 04/04/2022 6*    Monocytes Relative 04/04/2022 12     Eosinophils Relative 04/04/2022 0     Basophils Relative 04/04/2022 0     Neutrophils Absolute 04/04/2022 5 94     Immature Grans Absolute 04/04/2022 0 04     Lymphocytes Absolute 04/04/2022 0 43*    Monocytes Absolute 04/04/2022 0 87     Eosinophils Absolute 04/04/2022 0 01     Basophils Absolute 04/04/2022 0 02     Sodium 04/04/2022 137     Potassium 04/04/2022 3 5     Chloride 04/04/2022 100     CO2 04/04/2022 28     ANION GAP 04/04/2022 9     BUN 04/04/2022 21     Creatinine 04/04/2022 1 39*    Glucose 04/04/2022 118     Calcium 04/04/2022 8 5     Corrected Calcium 04/04/2022 9 1     AST 04/04/2022 18     ALT 04/04/2022 17     Alkaline Phosphatase 04/04/2022 120*    Total Protein 04/04/2022 7 3     Albumin 04/04/2022 3 2*    Total Bilirubin 04/04/2022 0 37     eGFR 04/04/2022 47     Magnesium 04/04/2022 1 8     Phosphorus 04/04/2022 2 9     Lipase 04/04/2022 91     Color, UA 04/04/2022 Yellow     Clarity, UA 04/04/2022 Slightly Cloudy     Specific Gravity, UA 04/04/2022 1 010     pH, UA 04/04/2022 7 0     Leukocytes, UA 04/04/2022 Negative     Nitrite, UA 04/04/2022 Positive*    Protein, UA 04/04/2022 Negative     Glucose, UA 04/04/2022 Negative     Ketones, UA 04/04/2022 Trace*    Urobilinogen, UA 04/04/2022 0 2     Bilirubin, UA 04/04/2022 Negative     Blood, UA 04/04/2022 Small*    TSH 3RD GENERATON 04/04/2022 2 971     RBC, UA 04/04/2022 10-20*    WBC, UA 04/04/2022 None Seen     Epithelial Cells 04/04/2022 None Seen     Bacteria, UA 04/04/2022 Occasional     Sodium 04/05/2022 137     Potassium 04/05/2022 3 3*    Chloride 04/05/2022 103     CO2 04/05/2022 27     ANION GAP 04/05/2022 7     BUN 04/05/2022 17     Creatinine 04/05/2022 1 02     Glucose 04/05/2022 101     Calcium 04/05/2022 7 8*    Corrected Calcium 04/05/2022 8 7     AST 04/05/2022 21     ALT 04/05/2022 19     Alkaline Phosphatase 04/05/2022 105     Total Protein 04/05/2022 6 6     Albumin 04/05/2022 2 9*    Total Bilirubin 04/05/2022 0 34     eGFR 04/05/2022 68     WBC 04/05/2022 8 79     RBC 04/05/2022 3 56*    Hemoglobin 04/05/2022 10 3*    Hematocrit 04/05/2022 31 5*    MCV 04/05/2022 89     MCH 04/05/2022 28 9     MCHC 04/05/2022 32 7     RDW 04/05/2022 14 6     MPV 04/05/2022 8 6*    Platelets 44/87/6374 257     nRBC 04/05/2022 0     Neutrophils Relative 04/05/2022 76*    Immat GRANS % 04/05/2022 1     Lymphocytes Relative 04/05/2022 7*    Monocytes Relative 04/05/2022 15*    Eosinophils Relative 04/05/2022 1     Basophils Relative 04/05/2022 0     Neutrophils Absolute 04/05/2022 6 76     Immature Grans Absolute 04/05/2022 0 04     Lymphocytes Absolute 04/05/2022 0 63     Monocytes Absolute 04/05/2022 1 29*    Eosinophils Absolute 04/05/2022 0 04     Basophils Absolute 04/05/2022 0 03        Imaging Studies: I have personally reviewed pertinent imaging studies      [unfilled]

## 2022-04-05 NOTE — ASSESSMENT & PLAN NOTE
Lab Results   Component Value Date    CREATININE 1 02 04/05/2022   Creatinine 1 39 on admission  Improved to 1 02 with IV fluid hydration  · Unclear baseline, no prior labs available in care everywhere     · Suspect prerenal secondary to dehyrdration given poor PO intake  · Patient received contrast for CT a/p, continue IVF hydration  · Trend BMP  · Avoid nephrotoxins, hypotension

## 2022-04-05 NOTE — ANESTHESIA POSTPROCEDURE EVALUATION
Post-Op Assessment Note    CV Status:  Stable  Pain Score: 0    Pain management: adequate     Mental Status:  Awake   Hydration Status:  Euvolemic   PONV Controlled:  Controlled   Airway Patency:  Patent      Post Op Vitals Reviewed: Yes      Staff: CRNA         No complications documented      BP  164/99   Temp (P) 97 9 °F (36 6 °C) (04/05/22 1716)    Pulse (!) (P) 109 (04/05/22 1716)   Resp (P) 16 (04/05/22 1716)    SpO2 100

## 2022-04-05 NOTE — CONSULTS
Consult received for poor po and wt loss  Spoke with pt's daughter Jonathan Jarvis at bedside  Reports pt lives at home with her brother who works "a lot of hours " Pt typically prepares his own meals though limited to convenient foods such as jelly toast for breakfast, canned spaghetti with meatballs for lunch  Yolanda reports pt was telling her over the phone what he was eating everyday though was not sure if he was eating adequate amounts/if any of it  Pt returned from [place] during discussion, reports he has had poor appetite x 1 mo, has no desire to eat  Somedays would only eat jelly toast the entire day  Yolanda reports pt was normally 140lb and had noticed some gradual weight loss with recent sudden weight loss "he must have lost 15lb recently " Limited weight hx per chart review  4/5/21 127lb, 4/4/22 118lb  Physical exam performed, pt meets criteria for severe malnutrition  Pt currently NPO for possible surgery pending family discussion  Once appropriate for oral diet, would recommend regular diet with ensure enlive TID  Consider appetite stimulant as medically appropriate  Recommend daily weights for nutrition monitoring  Thank you for the consult, will follow up

## 2022-04-05 NOTE — ASSESSMENT & PLAN NOTE
Malnutrition Findings:   · Reports 15 lb weight loss over the last 1-2 months with poor PO intake  Cachectic appearing  · Consult placed to nutrition for further recommendations  · Currently NPO , continue IVF hydration     BMI Findings: Body mass index is 19 11 kg/m²

## 2022-04-05 NOTE — H&P
[address]  H&P- Andrea Brittny Sr  1940, 81 y o  male MRN: [id number]  Unit/Bed#: -01 Encounter: [id number]  Primary Care Provider: No primary care provider on file  Date and time admitted to hospital: 4/4/2022  7:28 PM    * Colonic mass  Assessment & Plan  · Presents from home with nausea, dry heaves, loss of appetite and 15 lb weight loss over the last month  · Reports father with history of colon cancer diagnosed at age 80  · No prior colonoscopies or cancer history per patient and family  Denies medical/surgical history  · CT a/p w contrast: "Colonic mass involving the hepatic flexure where there is marked circumferential wall thickening and significant luminal narrowing with multiple fluid-filled dilated loops of since distal small bowel, and fluid distended proximal colon compatible with obstruction  No pneumatosis or free air  No definite metastatic disease  Haziness involving the omentum in the right upper quadrant adjacent to the mass which is nonspecific but is concerning for localized carcinomatosis "  · ED provider discussed with surgery on call, no interventions at this time  Consult placed to surgery for further evaluation  · Keep NPO   · IVF hydration   · Consider GI consult   · No vomiting, mild abdominal tenderness mid-abdomen/epigastrum  Has been having small BM's both loose and formed  Continue serial abdominal exams  · Discussed with patient, son, daughter at bedside  Patient would like to discuss with the rest of his family regarding the possibility of further invasive workup   Patient agreeable to surgery consult      Anemia  Assessment & Plan  · Hgb 11 2 on admission   · No baseline labs available  · Denies melena, hematochezia  · Check folate, B12, iron panel, occult stool  · Continue to trend CBC  · Continue workup for possible colon malignancy     Elevated serum creatinine  Assessment & Plan  Lab Results   Component Value Date CREATININE 1 39 (H) 04/04/2022     · Unclear baseline, no prior labs available in care everywhere  · Suspect prerenal secondary to dehyrdration given poor PO intake  · Patient received contrast for CT a/p, continue IVF hydration  · Trend BMP  · Avoid nephrotoxins, hypotension       Malnutrition (HCC)  Assessment & Plan  Malnutrition Findings:   · Reports 15 lb weight loss over the last 1-2 months with poor PO intake  Cachectic appearing  · Consult placed to nutrition for further recommendations  · Currently NPO , continue IVF hydration     BMI Findings: Body mass index is 19 11 kg/m²  VTE Pharmacologic Prophylaxis: VTE Score: 3 Moderate Risk (Score 3-4) - Pharmacological DVT Prophylaxis Ordered: heparin  Code Status: Level 1 - Full Code   Discussion with family: Updated  (son and daughter) at bedside  Anticipated Length of Stay: Patient will be admitted on an inpatient basis with an anticipated length of stay of greater than 2 midnights secondary to obstructing colon mass- surgery eval      Total Time for Visit, including Counseling / Coordination of Care: 70 minutes Greater than 50% of this total time spent on direct patient counseling and coordination of care  Chief Complaint:     History of Present Illness:  Dl Sanders  is a 81 y o  male with no prior PMH who presents with nausea, dry heaves, loss of appetite and approximately 15 lb weight loss over the last 1-2 months  Reports he has been unable to keep food down due to nausea  Reports fatigue  Reports dry heaves with no vomiting  Denies any melena, hematochezia  No prior colonoscopy  No known history of cancer  Family history notable for colon cancer in his father, diagnosed at age of 80  Denies any prior medical history, no current medications  Review of Systems:  Review of Systems   Constitutional: Positive for activity change, appetite change and fatigue   Negative for chills, diaphoresis and fever    Respiratory: Negative for cough and shortness of breath  Cardiovascular: Negative for chest pain, palpitations and leg swelling  Gastrointestinal: Positive for abdominal pain, diarrhea and nausea  Negative for anal bleeding, blood in stool and vomiting  Intermittent mild mid-abdomen   Dry heaves  Intermittent loose and formed stools    Genitourinary: Negative for difficulty urinating  Musculoskeletal: Negative for arthralgias and back pain  Neurological: Negative for dizziness, light-headedness, numbness and headaches  All other systems reviewed and are negative  Past Medical and Surgical History:   History reviewed  No pertinent past medical history  Past Surgical History:   Procedure Laterality Date    HERNIA REPAIR         Meds/Allergies:  Prior to Admission medications    Not on File     I have reviewed home medications with patient personally  no home meds     Allergies: No Known Allergies    Social History:  Marital Status:    Patient Pre-hospital Living Situation: Home, With other family member: son  Patient Pre-hospital Level of Mobility: walks  Patient Pre-hospital Diet Restrictions: none  Substance Use History:   Social History     Substance and Sexual Activity   Alcohol Use Not Currently     Social History     Tobacco Use   Smoking Status Never Smoker   Smokeless Tobacco Never Used     Social History     Substance and Sexual Activity   Drug Use Never       Family History:  History reviewed  No pertinent family history  Physical Exam:     Vitals:   Blood Pressure: (!) 165/104 (04/04/22 2356)  Pulse: 97 (04/04/22 2356)  Temperature: 99 °F (37 2 °C) (04/04/22 2356)  Temp Source: Temporal (04/04/22 1923)  Respirations: 18 (04/04/22 2356)  Height: 5' 6" (167 6 cm) (04/04/22 1923)  Weight - Scale: 53 7 kg (118 lb 6 4 oz) (04/04/22 1923)  SpO2: 98 % (04/04/22 2356)    Physical Exam  Vitals and nursing note reviewed     Constitutional:       General: He is not in acute distress  Appearance: He is ill-appearing  He is not toxic-appearing or diaphoretic  Comments: Frail, cachetic    HENT:      Head: Normocephalic and atraumatic  Eyes:      Extraocular Movements: Extraocular movements intact  Pupils: Pupils are equal, round, and reactive to light  Cardiovascular:      Rate and Rhythm: Normal rate  Rhythm irregular  Pulses: Normal pulses  Heart sounds: Normal heart sounds  Pulmonary:      Effort: Pulmonary effort is normal       Breath sounds: Normal breath sounds  Abdominal:      General: Abdomen is flat  There is no distension  Tenderness: There is abdominal tenderness  There is no guarding  Comments: Hypoactive BS   Musculoskeletal:      Cervical back: Normal range of motion and neck supple  Right lower leg: No edema  Left lower leg: No edema  Skin:     General: Skin is warm and dry  Neurological:      General: No focal deficit present  Mental Status: He is alert  Additional Data:     Lab Results:  Results from last 7 days   Lab Units 04/04/22 2031   WBC Thousand/uL 7 31   HEMOGLOBIN g/dL 11 4*   HEMATOCRIT % 34 6*   PLATELETS Thousands/uL 291   NEUTROS PCT % 81*   LYMPHS PCT % 6*   MONOS PCT % 12   EOS PCT % 0     Results from last 7 days   Lab Units 04/04/22 2031   SODIUM mmol/L 137   POTASSIUM mmol/L 3 5   CHLORIDE mmol/L 100   CO2 mmol/L 28   BUN mg/dL 21   CREATININE mg/dL 1 39*   ANION GAP mmol/L 9   CALCIUM mg/dL 8 5   ALBUMIN g/dL 3 2*   TOTAL BILIRUBIN mg/dL 0 37   ALK PHOS U/L 120*   ALT U/L 17   AST U/L 18   GLUCOSE RANDOM mg/dL 118                       Imaging: Reviewed radiology reports from this admission including: abdominal/pelvic CT  CT abdomen pelvis with contrast   Final Result by Wen Piper MD (04/04 2215)         1    Colonic mass involving the hepatic flexure where there is marked circumferential wall thickening and significant luminal narrowing, with multiple fluid-filled dilated loops of distal small bowel, and fluid distended proximal colon compatible with    obstruction  No pneumatosis or free air  No definite metastatic disease  2   Haziness involving the omentum in the right upper quadrant adjacent to the mass, which is nonspecific but is concerning for localized carcinomatosis      The study was marked in EPIC for immediate notification  Workstation performed: [id number]             EKG and Other Studies Reviewed on Admission:   · EKG: NSR  HR 90 with pvcs   ** Please Note: This note has been constructed using a voice recognition system   **

## 2022-04-05 NOTE — ASSESSMENT & PLAN NOTE
Malnutrition Findings:   Adult Malnutrition type: Chronic illness  Adult Degree of Malnutrition: Other severe protein calorie malnutrition  Malnutrition Characteristics: Muscle loss,Fat loss,Inadequate energy             secondary to colonic malignancy  Currently NPO due to small-bowel obstruction  Continue IV fluid hydration      360 Statement: Chronic severe malnutrition related to decreased appetite, N/V, dry heaves, colonic mass as evidenced by < 75% estimated energy intake > 1 mo, Severe muscle loss to pectoralis, deltoid, gastrocnemius and quadriceps muscles + scapula region, severe fat loss to buccal pads  Treated with: Pt currently NPO for possible surgery pending family discussion  Once appropriate for oral diet, would recommend regular diet with ensure enlive TID  Consider appetite stimulant as medically appropriate  Recommend daily weights for nutrition monitoring  BMI Findings: Body mass index is 19 11 kg/m²

## 2022-04-05 NOTE — MALNUTRITION/BMI
This medical record reflects one or more clinical indicators suggestive of malnutrition  Malnutrition Findings:   Adult Malnutrition type: Chronic illness  Adult Degree of Malnutrition: Other severe protein calorie malnutrition  Malnutrition Characteristics: Muscle loss,Fat loss,Inadequate energy                  360 Statement: Chronic severe malnutrition related to decreased appetite, N/V, dry heaves, colonic mass as evidenced by < 75% estimated energy intake > 1 mo, Severe muscle loss to pectoralis, deltoid, gastrocnemius and quadriceps muscles + scapula region, severe fat loss to buccal pads  Treated with: Pt currently NPO for possible surgery pending family discussion  Once appropriate for oral diet, would recommend regular diet with ensure enlive TID  Consider appetite stimulant as medically appropriate  Recommend daily weights for nutrition monitoring  BMI Findings: Body mass index is 19 11 kg/m²  See Nutrition note dated 4/5/22 for additional details  Completed nutrition assessment is viewable in the nutrition documentation

## 2022-04-05 NOTE — PROGRESS NOTES
[address]  Progress Note Kit Child Sr  1940, 81 y o  male MRN: [id number]  Unit/Bed#: -01 Encounter: [id number]  Primary Care Provider: No primary care provider on file  Date and time admitted to hospital: 4/4/2022  7:28 PM    * Small bowel obstruction [facility])  Assessment & Plan  · Presents from home with nausea, dry heaves,hiccups, loss of appetite and 15 lb weight loss over the last month  · Reports father with history of colon cancer diagnosed at age 80  · No prior colonoscopies or cancer history per patient and family  Denies medical/surgical history  Has not seen doctors or followed up with lab testing or screening test in years  · CT a/p w contrast: "Colonic mass involving the hepatic flexure where there is marked circumferential wall thickening and significant luminal narrowing with multiple fluid-filled dilated loops of since distal small bowel, and fluid distended proximal colon compatible with obstruction  No pneumatosis or free air  No definite metastatic disease  Haziness involving the omentum in the right upper quadrant adjacent to the mass which is nonspecific but is concerning for localized carcinomatosis "  · CT chest negative for malignancy  · Keep NPO   · IVF hydration   · Appreciate GI consult  · Discussed with patient at bedside and with daughter over the phone  Currently patient is agreeable for surgical intervention  Patient is medically cleared for surgery  Severe protein-calorie malnutrition ([address] )  Assessment & Plan  Malnutrition Findings:   Adult Malnutrition type: Chronic illness  Adult Degree of Malnutrition: Other severe protein calorie malnutrition  Malnutrition Characteristics: Muscle loss,Fat loss,Inadequate energy             secondary to colonic malignancy  Currently NPO due to small-bowel obstruction    Continue IV fluid hydration      360 Statement: Chronic severe malnutrition related to decreased appetite, N/V, dry heaves, colonic mass as evidenced by < 75% estimated energy intake > 1 mo, Severe muscle loss to pectoralis, deltoid, gastrocnemius and quadriceps muscles + scapula region, severe fat loss to buccal pads  Treated with: Pt currently NPO for possible surgery pending family discussion  Once appropriate for oral diet, would recommend regular diet with ensure enlive TID  Consider appetite stimulant as medically appropriate  Recommend daily weights for nutrition monitoring  BMI Findings: Body mass index is 19 11 kg/m²  JENSEN (acute kidney injury) [facility])  Assessment & Plan  Lab Results   Component Value Date    CREATININE 1 02 04/05/2022   Creatinine 1 39 on admission  Improved to 1 02 with IV fluid hydration  · Unclear baseline, no prior labs available in care everywhere  · Suspect prerenal secondary to dehyrdration given poor PO intake  · Patient received contrast for CT a/p, continue IVF hydration  · Trend BMP  · Avoid nephrotoxins, hypotension       Acute on chronic anemia  Assessment & Plan  · Hgb 11 2 on admission   · No baseline labs available  Hemoglobin dropped to 10 3 with hydration  Also due to underlying colonic malignancy  · Denies melena, hematochezia  · normal folate, low B12 and iron panel, occult stool pending  · Continue to trend CBC        VTE Pharmacologic Prophylaxis:   Pharmacologic: Heparin  Mechanical VTE Prophylaxis in Place: Yes    Patient Centered Rounds: I have performed bedside rounds with nursing staff today  Discussions with Specialists or Other Care Team Provider:  Discussed with general surgery and GI    Education and Discussions with Family / Patient:  Discussed with patient at bedside and with daughter over the phone    Time Spent for Care: 45 minutes  More than 50% of total time spent on counseling and coordination of care as described above      Current Length of Stay: 1 day(s)    Current Patient Status: Inpatient   Certification Statement: The patient will continue to require additional inpatient hospital stay due to Small-bowel obstruction    Discharge Plan:  Pending progress  Plan for surgical intervention for small-bowel obstruction due to colonic mass    Code Status: Level 1 - Full Code      Subjective:   Patient denies any chest pain or shortness of breath but complains of intractable hiccups and lack of appetite and unable to have a bowel movement today  Reports no nausea    Objective:     Vitals:   Temp (24hrs), Av 4 °F (36 9 °C), Min:98 °F (36 7 °C), Max:99 °F (37 2 °C)    Temp:  [98 °F (36 7 °C)-99 °F (37 2 °C)] 98 1 °F (36 7 °C)  HR:  [84-98] 93  Resp:  [16-18] 16  BP: (147-165)/() 147/89  SpO2:  [97 %-100 %] 97 %  Body mass index is 19 11 kg/m²  Input and Output Summary (last 24 hours): Intake/Output Summary (Last 24 hours) at 2022 1259  Last data filed at 2022 1111  Gross per 24 hour   Intake 2185 ml   Output 2215 ml   Net -30 ml       Physical Exam:     Physical Exam  Vitals and nursing note reviewed  Constitutional:       Appearance: He is ill-appearing  Comments: Severe generalized muscle wasting noted   HENT:      Head: Normocephalic and atraumatic  Right Ear: External ear normal       Left Ear: External ear normal       Nose: Nose normal       Mouth/Throat:      Pharynx: Oropharynx is clear  Eyes:      Pupils: Pupils are equal, round, and reactive to light  Cardiovascular:      Rate and Rhythm: Normal rate and regular rhythm  Heart sounds: Normal heart sounds  Pulmonary:      Effort: Pulmonary effort is normal       Breath sounds: Normal breath sounds  Abdominal:      General: There is distension  Palpations: Abdomen is soft  Tenderness: There is abdominal tenderness  Comments: Hypoactive bowel sounds   Musculoskeletal:         General: Normal range of motion  Cervical back: Normal range of motion and neck supple  Skin:     General: Skin is warm and dry        Capillary Refill: Capillary refill takes less than 2 seconds  Neurological:      General: No focal deficit present  Mental Status: He is alert and oriented to person, place, and time  Psychiatric:         Mood and Affect: Mood normal            Additional Data:     Labs:    Results from last 7 days   Lab Units 04/05/22  0517   WBC Thousand/uL 8 79   HEMOGLOBIN g/dL 10 3*   HEMATOCRIT % 31 5*   PLATELETS Thousands/uL 257   NEUTROS PCT % 76*   LYMPHS PCT % 7*   MONOS PCT % 15*   EOS PCT % 1     Results from last 7 days   Lab Units 04/05/22  0517   SODIUM mmol/L 137   POTASSIUM mmol/L 3 3*   CHLORIDE mmol/L 103   CO2 mmol/L 27   BUN mg/dL 17   CREATININE mg/dL 1 02   ANION GAP mmol/L 7   CALCIUM mg/dL 7 8*   ALBUMIN g/dL 2 9*   TOTAL BILIRUBIN mg/dL 0 34   ALK PHOS U/L 105   ALT U/L 19   AST U/L 21   GLUCOSE RANDOM mg/dL 101                           * I Have Reviewed All Lab Data Listed Above  * Additional Pertinent Lab Tests Reviewed: [address] Admission Reviewed    Imaging:    Imaging Reports Reviewed Today Include:  CT chest abdomen pelvis  Imaging Personally Reviewed by Myself Includes:  CT chest abdomen pelvis    Recent Cultures (last 7 days):           Last 24 Hours Medication List:   Current Facility-Administered Medications   Medication Dose Route Frequency Provider Last Rate    cyanocobalamin  1,000 mcg Subcutaneous Daily Libra Handy MD      heparin (porcine)  5,000 Units Subcutaneous [facility] Jorge A [place]      morphine injection  2 mg Intravenous Q4H PRN FILI An      ondansetron  4 mg Intravenous Q6H PRN FILI An      sodium chloride  50 mL/hr Intravenous Continuous Libra Handy MD 50 mL/hr (04/05/22 1111)        Today, Patient Was Seen By: Libra Handy MD    ** Please Note: Dictation voice to text software may have been used in the creation of this document   **

## 2022-04-05 NOTE — CASE MANAGEMENT
Case Management Assessment & Discharge Planning Note    Patient name Yamilex Esparza   Location /-01 MRN [id number]  : 1940 Date 2022       Current Admission Date: 2022  Current Admission Diagnosis:Colonic mass   Patient Active Problem List    Diagnosis Date Noted    Malnutrition ([address] ) 2022    Colonic mass 2022    Anemia 2022    Elevated serum creatinine 2022      LOS (days): 1  Geometric Mean LOS (GMLOS) (days): 4 30  Days to GMLOS:3 8     OBJECTIVE:    Risk of Unplanned Readmission Score: 11         Current admission status: Inpatient       Preferred Pharmacy:   [facility], [address]  [address] [place] [id number]  Phone: [phone] Fax: [phone]    Primary Care Provider: No primary care provider on file  Primary Insurance: MEDICARE  Secondary Insurance:     ASSESSMENT:  Active Health Care Proxies    There are no active Health Care Proxies on file  CM met with patient at bedside,baseline information was obtained  SonSchuyler and daughter, Lacey Menchaca, were present for assessment  CM discussed the role of CM in helping the patient develop a discharge plan and assist the patient in carry out their plan  Patient currently sleeps on sofa located in living room of family home however bathroom is located downstairs or upstairs so family identified need DME referral for bedside commode  CM  explained CM will follow for any additional referrals following patient's surgery  Goal of patient/family upon discharge is to go home with son who lives in home with patient  Readmission Root Cause  30 Day Readmission: No    Patient Information  Admitted from[de-identified] Home  Mental Status: Alert  During Assessment patient was accompanied by:  Son,Daughter  Assessment information provided by[de-identified] Son,Daughter,Patient  Primary Caregiver: Child  Caregiver's Name[de-identified] Candice Coyle son  Caregiver's Relationship to Patient[de-identified] Family Member  Caregiver's Telephone Number[de-identified] see face sheet  Support Systems: [facility] of Residence: [address] [place]  do you live in?: [address] [facility] entry access options   Select all that apply : Stairs  Type of Current Residence: 3 story home  Upon entering residence, is there a bedroom on the main floor (no further steps)?: No (patient sleeps on couch in living room)  A bedroom is located on the following floor levels of residence (select all that apply):: 2nd Floor  Upon entering residence, is there a bathroom on the main floor (no further steps)?: No  Indicate which floors of current residence have a bathroom (select all the apply):: 2nd Floor,Basement (patient requests bedside commode)  Number of steps to basement from main floor: One Flight  Number of steps to 2nd floor from main floor: One Flight  In the last 12 months, was there a time when you were not able to pay the mortgage or rent on time?: No  In the last 12 months, how many places have you lived?: 1  In the last 12 months, was there a time when you did not have a steady place to sleep or slept in a shelter (including now)?: No  Homeless/housing insecurity resource given?: N/A  Living Arrangements: Lives w/ Son  Is patient a ?: No    Activities of Daily Living Prior to Admission  Functional Status: Assistance  Completes ADLs independently?: No  Level of ADL dependence: Assistance  Ambulates independently?: Yes  Does patient use assisted devices?: No  Does patient currently own DME?: No  Does patient have a history of Outpatient Therapy (PT/OT)?: No  Does the patient have a history of Short-Term Rehab?: No  Does patient have a history of HHC?: No  Does patient currently have [address]?: No         Patient Information Continued  Income Source: SSI/SSD  Within the past 12 months, you worried that your food would run out before you got the money to buy more : Never true  Within the past 12 months, the food you bought just didnt last and you didnt have money to get more : Never true  Food insecurity resource given?: N/A  Does patient receive dialysis treatments?: No  Does patient have a history of substance abuse?: No  Does patient have a history of Mental Health Diagnosis?: No         Means of Transportation  Means of Transport to Appts[de-identified] Family transport  In the past 12 months, has lack of transportation kept you from medical appointments or from getting medications?: No  In the past 12 months, has lack of transportation kept you from meetings, work, or from getting things needed for daily living?: No  Was application for public transport provided?: N/A        DISCHARGE DETAILS:    Discharge planning discussed with[de-identified] raul, nicko Lacey Menchaca and vidal Cooper Cover of Choice: Yes     CM contacted family/caregiver?: Yes (vidal Perkins and nicko Menchaca)             Contacts  Patient Contacts: vidal Perkins and nicko Menchaca  Relationship to Patient[de-identified] Family  Contact Method:  In Person  Reason/Outcome: [facility] of [address] [place] [address]

## 2022-04-05 NOTE — ANESTHESIA PROCEDURE NOTES
Arterial Line Insertion  Performed by: Michael Allen CRNA  Authorized by: Ricardo Kenny MD     Sedation:  Patient sedated: yes (pt under ga)    Procedure Details:  Needle gauge: 20  Number of attempts: 1    Post-procedure:  Post-procedure: dressing applied  Waveform: good waveform  Post-procedure CNS: normal  Patient tolerance: patient tolerated the procedure well with no immediate complications

## 2022-04-05 NOTE — ASSESSMENT & PLAN NOTE
Lab Results   Component Value Date    CREATININE 1 39 (H) 04/04/2022     · Unclear baseline, no prior labs available in care everywhere     · Suspect prerenal secondary to dehyrdration given poor PO intake  · Patient received contrast for CT a/p, continue IVF hydration  · Trend BMP  · Avoid nephrotoxins, hypotension

## 2022-04-05 NOTE — PLAN OF CARE
Problem: MOBILITY - ADULT  Goal: Maintain or return to baseline ADL function  Description: INTERVENTIONS:  -  Assess patient's ability to carry out ADLs; assess patient's baseline for ADL function and identify physical deficits which impact ability to perform ADLs (bathing, care of mouth/teeth, toileting, grooming, dressing, etc )  - Assess/evaluate cause of self-care deficits   - Assess range of motion  - Assess patient's mobility; develop plan if impaired  - Assess patient's need for assistive devices and provide as appropriate  - Encourage maximum independence but intervene and supervise when necessary  - Involve family in performance of ADLs  - Assess for home care needs following discharge   - Consider OT consult to assist with ADL evaluation and planning for discharge  - Provide patient education as appropriate  Outcome: Progressing  Goal: Maintains/Returns to pre admission functional level  Description: INTERVENTIONS:  - Perform BMAT or MOVE assessment daily    - Set and communicate daily mobility goal to care team and patient/family/caregiver  - Collaborate with rehabilitation services on mobility goals if consulted  - Perform Range of Motion 4 times a day  - Reposition patient every 2 hours    - Dangle patient 3 times a day  - Stand patient 3 times a day  - Ambulate patient 3 times a day  - Out of bed to chair 3 times a day   - Out of bed for meals 3 times a day  - Out of bed for toileting  - Record patient progress and toleration of activity level   Outcome: Progressing     Problem: Potential for Falls  Goal: Patient will remain free of falls  Description: INTERVENTIONS:  - Educate patient/family on patient safety including physical limitations  - Instruct patient to call for assistance with activity   - Consult OT/PT to assist with strengthening/mobility   - Keep Call bell within reach  - Keep bed low and locked with side rails adjusted as appropriate  - Keep care items and personal belongings within reach  - Initiate and maintain comfort rounds  - Make Fall Risk Sign visible to staff  - Offer Toileting every 2 Hours, in advance of need  - Apply yellow socks and bracelet for high fall risk patients  - Consider moving patient to room near nurses station  Outcome: Progressing     Problem: Prexisting or High Potential for Compromised Skin Integrity  Goal: Skin integrity is maintained or improved  Description: INTERVENTIONS:  - Identify patients at risk for skin breakdown  - Assess and monitor skin integrity  - Assess and monitor nutrition and hydration status  - Monitor labs   - Assess for incontinence   - Turn and reposition patient  - Assist with mobility/ambulation  - Relieve pressure over bony prominences  - Avoid friction and shearing  - Provide appropriate hygiene as needed including keeping skin clean and dry  - Evaluate need for skin moisturizer/barrier cream  - Collaborate with interdisciplinary team   - Patient/family teaching  - Consider wound care consult   Outcome: Progressing     Problem: Nutrition/Hydration-ADULT  Goal: Nutrient/Hydration intake appropriate for improving, restoring or maintaining nutritional needs  Description: Monitor and assess patient's nutrition/hydration status for malnutrition  Collaborate with interdisciplinary team and initiate plan and interventions as ordered  Monitor patient's weight and dietary intake as ordered or per policy  Utilize nutrition screening tool and intervene as necessary  Determine patient's food preferences and provide high-protein, high-caloric foods as appropriate       INTERVENTIONS:  - Monitor oral intake, urinary output, labs, and treatment plans  - Assess nutrition and hydration status and recommend course of action  - Evaluate amount of meals eaten  - Assist patient with eating if necessary   - Allow adequate time for meals  - Recommend/ encourage appropriate diets, oral nutritional supplements, and vitamin/mineral supplements  - Order, calculate, and assess calorie counts as needed  - Recommend, monitor, and adjust tube feedings and TPN/PPN based on assessed needs  - Assess need for intravenous fluids  - Provide specific nutrition/hydration education as appropriate  - Include patient/family/caregiver in decisions related to nutrition  Outcome: Progressing

## 2022-04-05 NOTE — OP NOTE
OPERATIVE REPORT  PATIENT NAME: Eris Rice Sr     :  1940  MRN: [id number]  Pt Location: OW OR ROOM 01    SURGERY DATE: 2022    Surgeon(s) and Role:     * Alcira Hall DO - Primary     * Zane Peres PA-C - Assisting    Preop Diagnosis:  Colonic mass [K63 89]    Post-Op Diagnosis Codes:     * Colonic mass [K63 89]    Procedure(s) (LRB):  LAPAROTOMY EXPLORATORY, RIGHT DWAYNE COLECTOMY WITH ANASTOMOSIS (N/A)    Specimen(s):  ID Type Source Tests Collected by Time Destination   1 : Right colon Tissue Large Intestine, Right/Ascending Colon TISSUE EXAM Alcira Hall DO 2022  4:17 PM        Estimated Blood Loss:   Minimal    Drains:  NG/OG/Enteral Tube Nasogastric Left nare (Active)   Number of days: 0       Urethral Catheter Non-latex 16 Fr  (Active)   Number of days: 0       Anesthesia Type:   General    Operative Indications:  Obstructing mass of the right colon    Operative Findings:  Obstructing mass present just distal to the hepatic flexure within the proximal transverse colon, no sign of metastatic disease within the abdomen including normal-appearing liver and no peritoneal deposits, no sign of carcinomatosis  No other lesions were palpable within the colon  The NG tube was found to be within the stomach    Complications:   None    Procedure and Technique:  The patient is brought to the operating  A time-out was held the patient identified and procedure verified  Appropriate antibiotic prophylaxis and DVT prophylaxis was used  General anesthesia was administered  NG tube was well with Moreno catheter were placed  The area of the abdomen is prepped and draped usual sterile fashion using chlorhexidine solution  An Iodoband was used  Local anesthesia using 0 25% Marcaine with epinephrine was infiltrated in the midline  A midline a scalpel  Dissection was carried down through subcutaneous tissue using electrocautery  The fascia was incised in midline  The peritoneum was grasped and elevated using hemostats  The peritoneum was entered sharply using Metzenbaum scissors  The peritoneum was then opened for the length of the incision  Immediately, a mass was palpable just distal to the hepatic flexure consistent with the obstructing mass seen on CT scan  The remainder of the colon was palpated and no other abnormalities were noted  The liver was inspected and no metastatic disease was evident  There was no evidence of carcinomatosis or peritoneal implants  Due to this, it was decided to resect the right colon  An Vearl Breana wound protector was placed  The ascending colon was mobilized along white line of Toldt  Care was taken to avoid the right ureter as well as the duodenum  Adhesions of the peritoneum to the appendix and to the terminal ileum were taken down using electrocautery  Hepatic flexure was then mobilized using electrocautery  The omentum was freed from the transverse colon using electrocautery  A 75 mm blue load BONY stapler was used to divide the terminal ileum  The mesentery was then scored proximally  The EnSeal was used to divide the mesentery  This was completed until approximately 5 cm past the obstructing mass  An additional 75 mm blue loaded BONY stapler was used to divide the colon in this area  The remainder of the mesentery was divided  The right colon along with a portion of terminal ileum and the appendix were then sent as specimen  A suture of 3-0 silk was used to align the remaining terminal ileum with the transverse colon  The abdomen is protected using towels  An enterotomy and a colotomy were created  A 75 mm blue load BONY stapler was used to create a side-to-side anastomosis  This appeared to be adequate  The common enterotomy was then closed with a running suture of 2-0 PDS  This suture unfortunately did not inch down tightly  It was therefore decided to close the common enterotomy using a 60 mm TA stapler    This staple line was then oversewn using sutures of 3-0 silk  A suture of 3-0 silk was placed at the distal end of the anastomosis  The anastomosis was reintroduced into the abdomen  The area appeared to be hemostatic  The abdomen was irrigated  Hemostasis achieved using electrocautery  A closing tray was then utilized  Fascia was closed with running sutures of looped 1  PDS  Subcutaneous tissue was closed using 3-0 Vicryl  Skin was closed using staples  The incision was covered with a Mepilex dressing  The patient tolerated the procedure well was transferred to recovery in stable condition  At the end the procedure all needle instrument sponge counts were correct x2     I was present for the entire procedure and A physician assistant was required during the procedure for retraction tissue handling,dissection and suturing    Patient Disposition:  PACU       SIGNATURE: Cheron Aschoff, DO  DATE: April 5, 2022  TIME: 5:10 PM

## 2022-04-05 NOTE — CONSULTS
Consultation - General Surgery   Autumn Rogers  81 y o  male MRN: [id number]  Unit/Bed#: -01 Encounter: [id number]    Assessment/Plan     Assessment:  Colonic mass involving the hepatic flexure   Dilated loops of distal small bowel, and fluid distended proximal colon compatible with obstruction  No pneumatosis or free air  No definite metastatic disease  Family history of colon CA in father, patient denies prior colonoscopy   Afebrile  WBC 8 79  Hgb 10 3 (11 4)  JENSEN improved w hydration, Cr 1 02 (1 39)  HTN      Plan: To be seen by Dr Jeannine Del Rosario who will discuss surgical intervention with patient/family  NPO  IVF hydration  Monitor I/Os  Follow CBC and BMP  Fecal occult blood pending  Check CEA and CA 19-9  GI consult for colonoscopy/biopsy  Medicate PRN pain/nausea  Surgical options colon resection versus diverting colostomy  Family will be discussing any future surgical intervention later this morning   Nutrition consult pending  Management of medical issues per primary team      History of Present Illness     HPI:  Autumn Rogers  is a 81 y o  male who lives at home with his son  He presented to the ED from home last evening with complaints of fatigue, nausea, loss of appetite and a 15# weight loss over the previous 1 month  States that his stools have been small caliber and semi solid  States that he gets nauseous if he eats too much  Has been eating toast and crackers for the past 3-4 days  Last BM was yesterday  Denies any history of similar bowel issues, no history of blood in stool  Does not take blood thinners  Past surgical hx of umbilical hernia repair many years ago by Dr Rolin Felty in [address]  Father had colon cancer diagnosed at the age of 80  No history of prior colonoscopy in patient, he does not follow with a primary care provider  At time of exam this morning he denies fevers/chills  Denies chest pain or short of breath  Denies abdominal pain  Denies n/v since admission  Inpatient consult to Acute Care Surgery  Consult performed by: Kit Casillas PA-C  Consult ordered by: FILI Arenas        Review of Systems   Constitutional: Positive for activity change, appetite change and fatigue  HENT: Negative  Eyes: Negative  Respiratory: Negative  Cardiovascular: Negative  Gastrointestinal: Positive for abdominal pain, diarrhea and nausea  Endocrine: Negative  Genitourinary: Positive for decreased urine volume  Musculoskeletal: Positive for arthralgias and myalgias  Allergic/Immunologic: Negative  Neurological: Negative  Hematological: Negative  Psychiatric/Behavioral: Negative  Historical Information   History reviewed  No pertinent past medical history    Past Surgical History:   Procedure Laterality Date    HERNIA REPAIR       Social History   Social History     Substance and Sexual Activity   Alcohol Use Not Currently     Social History     Substance and Sexual Activity   Drug Use Never     E-Cigarette/Vaping     E-Cigarette/Vaping Substances     Social History     Tobacco Use   Smoking Status Never Smoker   Smokeless Tobacco Never Used     Family History:  Father: colon CA diagnosed at age 80     Meds/Allergies   all current active meds have been reviewed  current meds:   Current Facility-Administered Medications   Medication Dose Route Frequency    heparin (porcine) subcutaneous injection 5,000 Units  5,000 Units Subcutaneous Q8H [address]    morphine injection 2 mg  2 mg Intravenous Q4H PRN    ondansetron (ZOFRAN) injection 4 mg  4 mg Intravenous Q6H PRN    sodium chloride 0 9 % infusion  100 mL/hr Intravenous Continuous   and PTA meds:   None     No Known Allergies    Objective   First Vitals:   Blood Pressure: 157/84 (04/04/22 1925)  Pulse: 84 (04/04/22 1923)  Temperature: 98 °F (36 7 °C) (04/04/22 1923)  Temp Source: Temporal (04/04/22 1923)  Respirations: 16 (04/04/22 1923)  Height: 5' 6" (167 6 cm) (04/04/22 1923)  Weight - Scale: 53 7 kg (118 lb 6 4 oz) (04/04/22 1923)  SpO2: 100 % (04/04/22 1923)    Current Vitals:   Blood Pressure: (!) 165/104 (04/04/22 2356)  Pulse: 97 (04/04/22 2356)  Temperature: 99 °F (37 2 °C) (04/04/22 2356)  Temp Source: Temporal (04/04/22 1923)  Respirations: 18 (04/04/22 2356)  Height: 5' 6" (167 6 cm) (04/04/22 1923)  Weight - Scale: 53 7 kg (118 lb 6 4 oz) (04/04/22 1923)  SpO2: 98 % (04/04/22 2356)      Intake/Output Summary (Last 24 hours) at 4/5/2022 0718  Last data filed at 4/5/2022 5168  Gross per 24 hour   Intake 1000 ml   Output 1815 ml   Net -815 ml       Invasive Devices  Report    Peripheral Intravenous Line            Peripheral IV 04/04/22 Right Antecubital <1 day                Physical Exam  Vitals and nursing note reviewed  Constitutional:       Comments: Frail, cachectic   HENT:      Head: Normocephalic and atraumatic  Right Ear: External ear normal       Left Ear: External ear normal       Nose: Nose normal       Mouth/Throat:      Mouth: Mucous membranes are dry  Pharynx: Oropharynx is clear  Eyes:      General: No scleral icterus  Right eye: No discharge  Left eye: No discharge  Extraocular Movements: Extraocular movements intact  Conjunctiva/sclera: Conjunctivae normal       Pupils: Pupils are equal, round, and reactive to light  Cardiovascular:      Rate and Rhythm: Normal rate and regular rhythm  Pulses: Normal pulses  Heart sounds: Normal heart sounds  Pulmonary:      Effort: Pulmonary effort is normal  No respiratory distress  Breath sounds: Normal breath sounds  Abdominal:      General: Abdomen is flat  Bowel sounds are normal  There is no distension  Palpations: Abdomen is soft  There is no mass  Tenderness: There is no abdominal tenderness  There is no guarding or rebound  Hernia: No hernia is present  Musculoskeletal:         General: Normal range of motion        Cervical back: Normal range of motion and neck supple  Right lower leg: No edema  Left lower leg: No edema  Skin:     General: Skin is warm and dry  Capillary Refill: Capillary refill takes less than 2 seconds  Coloration: Skin is not jaundiced  Neurological:      General: No focal deficit present  Mental Status: He is alert and oriented to person, place, and time  Psychiatric:         Mood and Affect: Mood normal          Behavior: Behavior normal          Thought Content: Thought content normal          Judgment: Judgment normal          Lab Results:   I have personally reviewed pertinent lab results  CBC:   Lab Results   Component Value Date    WBC 8 79 04/05/2022    HGB 10 3 (L) 04/05/2022    HCT 31 5 (L) 04/05/2022    MCV 89 04/05/2022     04/05/2022    MCH 28 9 04/05/2022    MCHC 32 7 04/05/2022    RDW 14 6 04/05/2022    MPV 8 6 (L) 04/05/2022    NRBC 0 04/05/2022     CMP:   Lab Results   Component Value Date    SODIUM 137 04/05/2022    K 3 3 (L) 04/05/2022     04/05/2022    CO2 27 04/05/2022    BUN 17 04/05/2022    CREATININE 1 02 04/05/2022    CALCIUM 7 8 (L) 04/05/2022    AST 21 04/05/2022    ALT 19 04/05/2022    ALKPHOS 105 04/05/2022    EGFR 68 04/05/2022     Imaging: I have personally reviewed pertinent reports  CT abd/pelvis w contrast 4/4/22  Impression:     1   Colonic mass involving the hepatic flexure where there is marked circumferential wall thickening and significant luminal narrowing, with multiple fluid-filled dilated loops of distal small bowel, and fluid distended proximal colon compatible with   obstruction   No pneumatosis or free air   No definite metastatic disease  2   Haziness involving the omentum in the right upper quadrant adjacent to the mass, which is nonspecific but is concerning for localized carcinomatosis      EKG, Pathology, and Other Studies: I have personally reviewed pertinent reports        Counseling / Coordination of Care  Total floor / unit time spent today 40 minutes  Greater than 50% of total time was spent with the patient and / or family counseling and / or coordination of care  A description of the counseling / coordination of care: review of labs and imaging, obtaining history, performing exam, discussion of treatment plan      Zane Peres PA-C

## 2022-04-05 NOTE — ANESTHESIA PREPROCEDURE EVALUATION
Procedure:  LAPAROTOMY EXPLORATORY W/ BOWEL RESECTION (N/A Abdomen)    Relevant Problems   GI/HEPATIC   (+) Small bowel obstruction (HCC)      /RENAL   (+) JENSEN (acute kidney injury) ([facility] [address] )      HEMATOLOGY   (+) Acute on chronic anemia      Lab Results   Component Value Date    WBC 8 79 04/05/2022    HGB 10 3 (L) 04/05/2022    HCT 31 5 (L) 04/05/2022    MCV 89 04/05/2022     04/05/2022     Lab Results   Component Value Date    CALCIUM 7 8 (L) 04/05/2022    K 3 3 (L) 04/05/2022    CO2 27 04/05/2022     04/05/2022    BUN 17 04/05/2022    CREATININE 1 02 04/05/2022     CAD/PCI/MI/CHF - Denies  COPD/ASTHMA/FRANK - Denies  GERD - Endorses, does not use acid suppressing medication regularly  PROBLEMS WITH PRIOR ANESTHESIA - Denies  NPO STATUS - Patient reports last PO intake on prior to midnight    Denies having any vomiting today    Physical Exam    Airway    Mallampati score: III  TM Distance: >3 FB  Neck ROM: full     Dental   Comment: Missing multiple teeth, reports having some damaged teeth, upper dentures,     Cardiovascular  Rhythm: regular,     Pulmonary  Comment: Speaking in 2-3 word sentences, not tachypneic, normal work of breathing, on room air,     Other Findings        Anesthesia Plan  ASA Score- 4 Emergent    Anesthesia Type- general with ASA Monitors  Additional Monitors: arterial line  Airway Plan: ETT  Plan Factors-Exercise tolerance (METS): >4 METS  Chart reviewed  EKG reviewed  Imaging results reviewed  Existing labs reviewed  Patient summary reviewed  Patient is not a current smoker  Obstructive sleep apnea risk education given perioperatively  Induction- intravenous  Postoperative Plan- Plan for postoperative opioid use  Planned trial extubation    Informed Consent- Anesthetic plan and risks discussed with patient, son and daughter  I personally reviewed this patient with the CRNA  Discussed and agreed on the Anesthesia Plan with the CRNA             I, Norman Wiseman MD, personally examined the patient, reviewed the patient history and laboratory data, and explained the risks/benefits of the anesthetic to the patient  The patient has signed the appropriate consents and is ready to proceed

## 2022-04-05 NOTE — ASSESSMENT & PLAN NOTE
· Presents from home with nausea, dry heaves, loss of appetite and 15 lb weight loss over the last month  · Reports father with history of colon cancer diagnosed at age 80  · No prior colonoscopies or cancer history per patient and family  Denies medical/surgical history  · CT a/p w contrast: "Colonic mass involving the hepatic flexure where there is marked circumferential wall thickening and significant luminal narrowing with multiple fluid-filled dilated loops of since distal small bowel, and fluid distended proximal colon compatible with obstruction  No pneumatosis or free air  No definite metastatic disease  Haziness involving the omentum in the right upper quadrant adjacent to the mass which is nonspecific but is concerning for localized carcinomatosis "  · ED provider discussed with surgery on call, no interventions at this time  Consult placed to surgery for further evaluation  · Keep NPO   · IVF hydration   · Consider GI consult   · No vomiting, mild abdominal tenderness mid-abdomen/epigastrum  Has been having small BM's both loose and formed  Continue serial abdominal exams  · Discussed with patient, son, daughter at bedside  Patient would like to discuss with the rest of his family regarding the possibility of further invasive workup   Patient agreeable to surgery consult

## 2022-04-05 NOTE — NURSING NOTE
Patient attempting to pull out NG tube, Dr Birdie Sanchez notified and soft wrist restraints ordered for patient  Attempted redirection and decreasing stimuli without success

## 2022-04-06 LAB
ANION GAP SERPL CALCULATED.3IONS-SCNC: 7 MMOL/L (ref 4–13)
BUN SERPL-MCNC: 23 MG/DL (ref 5–25)
CALCIUM SERPL-MCNC: 7.2 MG/DL (ref 8.3–10.1)
CHLORIDE SERPL-SCNC: 106 MMOL/L (ref 100–108)
CO2 SERPL-SCNC: 24 MMOL/L (ref 21–32)
CREAT SERPL-MCNC: 1.3 MG/DL (ref 0.6–1.3)
ERYTHROCYTE [DISTWIDTH] IN BLOOD BY AUTOMATED COUNT: 14.8 % (ref 11.6–15.1)
GFR SERPL CREATININE-BSD FRML MDRD: 51 ML/MIN/1.73SQ M
GLUCOSE SERPL-MCNC: 128 MG/DL (ref 65–140)
HCT VFR BLD AUTO: 26.6 % (ref 36.5–49.3)
HGB BLD-MCNC: 8.5 G/DL (ref 12–17)
MCH RBC QN AUTO: 28.4 PG (ref 26.8–34.3)
MCHC RBC AUTO-ENTMCNC: 32 G/DL (ref 31.4–37.4)
MCV RBC AUTO: 89 FL (ref 82–98)
PLATELET # BLD AUTO: 202 THOUSANDS/UL (ref 149–390)
PMV BLD AUTO: 8.4 FL (ref 8.9–12.7)
POTASSIUM SERPL-SCNC: 3.7 MMOL/L (ref 3.5–5.3)
RBC # BLD AUTO: 2.99 MILLION/UL (ref 3.88–5.62)
SODIUM SERPL-SCNC: 137 MMOL/L (ref 136–145)
WBC # BLD AUTO: 10.95 THOUSAND/UL (ref 4.31–10.16)

## 2022-04-06 PROCEDURE — 99232 SBSQ HOSP IP/OBS MODERATE 35: CPT

## 2022-04-06 PROCEDURE — 80048 BASIC METABOLIC PNL TOTAL CA: CPT | Performed by: STUDENT IN AN ORGANIZED HEALTH CARE EDUCATION/TRAINING PROGRAM

## 2022-04-06 PROCEDURE — 85027 COMPLETE CBC AUTOMATED: CPT | Performed by: STUDENT IN AN ORGANIZED HEALTH CARE EDUCATION/TRAINING PROGRAM

## 2022-04-06 RX ORDER — CEFAZOLIN SODIUM 2 G/50ML
2000 SOLUTION INTRAVENOUS ONCE
Status: COMPLETED | OUTPATIENT
Start: 2022-04-06 | End: 2022-04-06

## 2022-04-06 RX ORDER — PANTOPRAZOLE SODIUM 40 MG/1
40 INJECTION, POWDER, FOR SOLUTION INTRAVENOUS
Status: DISCONTINUED | OUTPATIENT
Start: 2022-04-07 | End: 2022-04-10

## 2022-04-06 RX ADMIN — METRONIDAZOLE 500 MG: 500 INJECTION, SOLUTION INTRAVENOUS at 06:11

## 2022-04-06 RX ADMIN — CYANOCOBALAMIN 1000 MCG: 1000 INJECTION INTRAMUSCULAR; SUBCUTANEOUS at 08:52

## 2022-04-06 RX ADMIN — MORPHINE SULFATE 1 MG: 2 INJECTION, SOLUTION INTRAMUSCULAR; INTRAVENOUS at 19:59

## 2022-04-06 RX ADMIN — CEFAZOLIN SODIUM 2000 MG: 2 SOLUTION INTRAVENOUS at 08:52

## 2022-04-06 RX ADMIN — MORPHINE SULFATE 4 MG: 10 INJECTION INTRAVENOUS at 06:29

## 2022-04-06 RX ADMIN — HEPARIN SODIUM 5000 UNITS: 5000 INJECTION INTRAVENOUS; SUBCUTANEOUS at 06:11

## 2022-04-06 RX ADMIN — HEPARIN SODIUM 5000 UNITS: 5000 INJECTION INTRAVENOUS; SUBCUTANEOUS at 21:01

## 2022-04-06 RX ADMIN — SODIUM CHLORIDE 75 ML/HR: 0.9 INJECTION, SOLUTION INTRAVENOUS at 09:00

## 2022-04-06 RX ADMIN — CEFAZOLIN SODIUM 2000 MG: 2 SOLUTION INTRAVENOUS at 17:35

## 2022-04-06 RX ADMIN — HEPARIN SODIUM 5000 UNITS: 5000 INJECTION INTRAVENOUS; SUBCUTANEOUS at 13:57

## 2022-04-06 RX ADMIN — METRONIDAZOLE 500 MG: 500 INJECTION, SOLUTION INTRAVENOUS at 13:57

## 2022-04-06 RX ADMIN — MORPHINE SULFATE 4 MG: 10 INJECTION INTRAVENOUS at 11:47

## 2022-04-06 NOTE — ASSESSMENT & PLAN NOTE
Malnutrition Findings:   Adult Malnutrition type: Chronic illness  Adult Degree of Malnutrition: Other severe protein calorie malnutrition  Malnutrition Characteristics: Muscle loss,Fat loss,Inadequate energy  ·     · Reports 15 lb weight loss over the last 1-2 months with poor PO intake  Cachectic appearing  · Consult placed to nutrition for further recommendations  · Currently NPO POD 1- hemicolectomy, continue IVF hydration           secondary to colonic malignancy  Currently NPO due to small-bowel obstruction  Continue IV fluid hydration      360 Statement: Chronic severe malnutrition related to decreased appetite, N/V, dry heaves, colonic mass as evidenced by < 75% estimated energy intake > 1 mo, Severe muscle loss to pectoralis, deltoid, gastrocnemius and quadriceps muscles + scapula region, severe fat loss to buccal pads  Treated with: Pt currently NPO for possible surgery pending family discussion  Once appropriate for oral diet, would recommend regular diet with ensure enlive TID  Consider appetite stimulant as medically appropriate  Recommend daily weights for nutrition monitoring  BMI Findings: Body mass index is 19 05 kg/m²

## 2022-04-06 NOTE — NURSING NOTE
Patient has grown more alert and more mobile  Maneuvered his head/torso in a way that he was able to pull out his NGT  Entered patient's room and found patient alert holding NGT in hand  Patient has had zero output in NGT since returning to floor  Patient abdomen non-distended with hypoactive bowel sounds  Patient alert and confused  Dr Samm Oliveira notified, advised to not attempt re-insertion and monitor bowel status  Patient remains alert and anxious  Sat with patient and reoriented him to surroundings and his surgical procedure  Emotional support provided to patient and sat with patient for a great deal of time until anxiety subsided and he was able to fall asleep  During this time restraints have been removed to assess any continued line pulling with which he did not exhibit  Will continue to monitor and provide emotional support

## 2022-04-06 NOTE — ASSESSMENT & PLAN NOTE
· Presents from home with nausea, dry heaves,hiccups, loss of appetite and 15 lb weight loss over the last month  Reports father with history of colon cancer diagnosed at age 81  · No prior colonoscopies or cancer history per patient and family  Denies medical/surgical history  Has not seen doctors or followed up with lab testing or screening test in years  · CT a/p w contrast: "Colonic mass involving the hepatic flexure where there is marked circumferential wall thickening and significant luminal narrowing with multiple fluid-filled dilated loops of since distal small bowel, and fluid distended proximal colon compatible with obstruction  No pneumatosis or free air  No definite metastatic disease  Haziness involving the omentum in the right upper quadrant adjacent to the mass which is nonspecific but is concerning for localized carcinomatosis "  · CT chest negative for malignancy  · POD1: hemicolectomy w/anatamosis  · Keep NPO/sips with meds  · continue IVF hydration  · Appreciate general surgery recommendations  · Discussed with patient at bedside and with daughter over the phone  Currently patient is agreeable for surgical intervention  Patient is medically cleared for surgery

## 2022-04-06 NOTE — PLAN OF CARE
Problem: MOBILITY - ADULT  Goal: Maintain or return to baseline ADL function  Description: INTERVENTIONS:  -  Assess patient's ability to carry out ADLs; assess patient's baseline for ADL function and identify physical deficits which impact ability to perform ADLs (bathing, care of mouth/teeth, toileting, grooming, dressing, etc )  - Assess/evaluate cause of self-care deficits   - Assess range of motion  - Assess patient's mobility; develop plan if impaired  - Assess patient's need for assistive devices and provide as appropriate  - Encourage maximum independence but intervene and supervise when necessary  - Involve family in performance of ADLs  - Assess for home care needs following discharge   - Consider OT consult to assist with ADL evaluation and planning for discharge  - Provide patient education as appropriate  Outcome: Progressing  Goal: Maintains/Returns to pre admission functional level  Description: INTERVENTIONS:  - Perform BMAT or MOVE assessment daily    - Set and communicate daily mobility goal to care team and patient/family/caregiver  - Collaborate with rehabilitation services on mobility goals if consulted  - Reposition patient every 2 hours    - Out of bed for toileting  - Record patient progress and toleration of activity level   Outcome: Progressing     Problem: Potential for Falls  Goal: Patient will remain free of falls  Description: INTERVENTIONS:  - Educate patient/family on patient safety including physical limitations  - Instruct patient to call for assistance with activity   - Consult OT/PT to assist with strengthening/mobility   - Keep Call bell within reach  - Keep bed low and locked with side rails adjusted as appropriate  - Keep care items and personal belongings within reach  - Initiate and maintain comfort rounds  - Make Fall Risk Sign visible to staff  - Offer Toileting every 2 Hours, in advance of need  - Apply yellow socks and bracelet for high fall risk patients  - Consider moving patient to room near nurses station  Outcome: Progressing     Problem: Nutrition/Hydration-ADULT  Goal: Nutrient/Hydration intake appropriate for improving, restoring or maintaining nutritional needs  Description: Monitor and assess patient's nutrition/hydration status for malnutrition  Collaborate with interdisciplinary team and initiate plan and interventions as ordered  Monitor patient's weight and dietary intake as ordered or per policy  Utilize nutrition screening tool and intervene as necessary  Determine patient's food preferences and provide high-protein, high-caloric foods as appropriate       INTERVENTIONS:  - Monitor oral intake, urinary output, labs, and treatment plans  - Assess nutrition and hydration status and recommend course of action  - Evaluate amount of meals eaten  - Assist patient with eating if necessary   - Allow adequate time for meals  - Recommend/ encourage appropriate diets, oral nutritional supplements, and vitamin/mineral supplements  - Order, calculate, and assess calorie counts as needed  - Recommend, monitor, and adjust tube feedings and TPN/PPN based on assessed needs  - Assess need for intravenous fluids  - Provide specific nutrition/hydration education as appropriate  - Include patient/family/caregiver in decisions related to nutrition  Outcome: Progressing     Problem: PAIN - ADULT  Goal: Verbalizes/displays adequate comfort level or baseline comfort level  Description: Interventions:  - Encourage patient to monitor pain and request assistance  - Assess pain using appropriate pain scale  - Administer analgesics based on type and severity of pain and evaluate response  - Implement non-pharmacological measures as appropriate and evaluate response  - Consider cultural and social influences on pain and pain management  - Notify physician/advanced practitioner if interventions unsuccessful or patient reports new pain  Outcome: Progressing     Problem: GASTROINTESTINAL - ADULT  Goal: Minimal or absence of nausea and/or vomiting  Description: INTERVENTIONS:  - Administer IV fluids if ordered to ensure adequate hydration  - Maintain NPO status until nausea and vomiting are resolved  - Nasogastric tube if ordered  - Administer ordered antiemetic medications as needed  - Provide nonpharmacologic comfort measures as appropriate  - Advance diet as tolerated, if ordered  - Consider nutrition services referral to assist patient with adequate nutrition and appropriate food choices  Outcome: Progressing  Goal: Maintains or returns to baseline bowel function  Description: INTERVENTIONS:  - Assess bowel function  - Encourage oral fluids to ensure adequate hydration  - Administer IV fluids if ordered to ensure adequate hydration  - Administer ordered medications as needed  - Encourage mobilization and activity  - Consider nutritional services referral to assist patient with adequate nutrition and appropriate food choices  Outcome: Progressing  Goal: Maintains adequate nutritional intake  Description: INTERVENTIONS:  - Monitor percentage of each meal consumed  - Identify factors contributing to decreased intake, treat as appropriate  - Assist with meals as needed  - Monitor I&O, weight, and lab values if indicated  - Obtain nutrition services referral as needed  Outcome: Progressing  Goal: Oral mucous membranes remain intact  Description: INTERVENTIONS  - Assess oral mucosa and hygiene practices  - Implement preventative oral hygiene regimen  - Implement oral medicated treatments as ordered  - Initiate Nutrition services referral as needed  Outcome: Progressing

## 2022-04-06 NOTE — ASSESSMENT & PLAN NOTE
· Hgb 11 2 on admission   · No baseline labs available  Hemoglobin dropped to 10 3 with hydration    Also due to underlying colonic malignancy  · Denies melena, hematochezia  · normal folate, low B12 and iron panel, occult stool pending  · Continue to trend CBC  · Consider transfusion <8

## 2022-04-06 NOTE — PROGRESS NOTES
Progress Note - General Surgery   Jac Castillo Sr  81 y o  male MRN: [id number]  Unit/Bed#: -01 Encounter: [id number]    Assessment:  - POD#1 s/p exploratory laparotomy and right hemicolectomy with anastomosis  - Reports minimal pain  No N/V  Not passing flatus yet  Good bowel sounds  - VSS and WNL  - WBC 10 59  - Hgb 8 5 (10 3), likely surgery related blood-loss  - CEA 3 5    Plan:  - NPO sips with meds  - Pain/nausea control prn  - IVF hydration  - OOB as tolerated  - Will keep Mepilex dressing in place today  - Daily CBC and BMP  Monitor Hgb and transfuse if <8   - Medical management as per medicine  Subjective/Objective     Subjective: No acute events overnight  Patient states pain is low today  He is not yet passing flatus  He denies bloating, n/v/d/c, difficulty urinating, fever, chills, CP, or SOB  Objective:     Blood pressure 110/57, pulse 78, temperature 98 °F (36 7 °C), temperature source Oral, resp  rate 17, height 5' 6" (1 676 m), weight 53 5 kg (118 lb), SpO2 97 %  ,Body mass index is 19 05 kg/m²  Intake/Output Summary (Last 24 hours) at 4/6/2022 0746  Last data filed at 4/6/2022 5849  Gross per 24 hour   Intake 2912 5 ml   Output 1740 ml   Net 1172 5 ml       Invasive Devices  Report    Peripheral Intravenous Line            Peripheral IV 04/04/22 Right Antecubital 1 day    Peripheral IV 04/05/22 Left Hand <1 day          Drain            Urethral Catheter Non-latex 16 Fr  <1 day                Physical Exam: /57   Pulse 78   Temp 98 °F (36 7 °C) (Oral)   Resp 17   Ht 5' 6" (1 676 m)   Wt 53 5 kg (118 lb)   SpO2 97%   BMI 19 05 kg/m²   General appearance: alert and oriented, in no acute distress  Lungs: clear to auscultation bilaterally  Heart: regular rate and rhythm, S1, S2 normal, no murmur, click, rub or gallop  Abdomen: soft, non-tender; bowel sounds normal; no masses,  no organomegaly and Mepilex dressing placed over abdominal midline      Lab, Imaging and other studies:  I have personally reviewed pertinent lab results      CBC:   Lab Results   Component Value Date    WBC 10 95 (H) 04/06/2022    HGB 8 5 (L) 04/06/2022    HCT 26 6 (L) 04/06/2022    MCV 89 04/06/2022     04/06/2022    MCH 28 4 04/06/2022    MCHC 32 0 04/06/2022    RDW 14 8 04/06/2022    MPV 8 4 (L) 04/06/2022   CMP:   Lab Results   Component Value Date    SODIUM 137 04/06/2022    K 3 7 04/06/2022     04/06/2022    CO2 24 04/06/2022    BUN 23 04/06/2022    CREATININE 1 30 04/06/2022    CALCIUM 7 2 (L) 04/06/2022    EGFR 51 04/06/2022     VTE Pharmacologic Prophylaxis: Heparin  VTE Mechanical Prophylaxis: sequential compression device     Al Freitas PA-C

## 2022-04-06 NOTE — ASSESSMENT & PLAN NOTE
Lab Results   Component Value Date    CREATININE 1 30 04/06/2022   Creatinine 1 39 on admission  Improved to 1 02 with IV fluid hydration  · Unclear baseline, no prior labs available in care everywhere     · Suspect prerenal secondary to dehyrdration given poor PO intake  · Patient received contrast for CT a/p, continue IVF hydration  · Trend BMP  · Avoid nephrotoxins, hypotension

## 2022-04-06 NOTE — ASSESSMENT & PLAN NOTE
· Obstructing mass distal to hepatic flexure  · POD#1 S/p ex lap, right hemicolectomy with anastomosis  · Surgical pathology pending

## 2022-04-06 NOTE — PROGRESS NOTES
[address]  Progress Note Liz Rojo Sr  1940, 81 y o  male MRN: [id number]  Unit/Bed#: -01 Encounter: [id number]  Primary Care Provider: No primary care provider on file  Date and time admitted to hospital: 4/4/2022  7:28 PM    * Small bowel obstruction [facility])  Assessment & Plan  · Presents from home with nausea, dry heaves,hiccups, loss of appetite and 15 lb weight loss over the last month  Reports father with history of colon cancer diagnosed at age 81  · No prior colonoscopies or cancer history per patient and family  Denies medical/surgical history  Has not seen doctors or followed up with lab testing or screening test in years  · CT a/p w contrast: "Colonic mass involving the hepatic flexure where there is marked circumferential wall thickening and significant luminal narrowing with multiple fluid-filled dilated loops of since distal small bowel, and fluid distended proximal colon compatible with obstruction  No pneumatosis or free air  No definite metastatic disease  Haziness involving the omentum in the right upper quadrant adjacent to the mass which is nonspecific but is concerning for localized carcinomatosis "  · CT chest negative for malignancy  · POD1: hemicolectomy w/anatamosis  · Keep NPO/sips with meds  · continue IVF hydration  · Appreciate general surgery recommendations  · Discussed with patient at bedside and with daughter over the phone  Currently patient is agreeable for surgical intervention  Patient is medically cleared for surgery       Colonic mass  Assessment & Plan  · Obstructing mass distal to hepatic flexure  · POD#1 S/p ex lap, right hemicolectomy with anastomosis  · Surgical pathology pending    Severe protein-calorie malnutrition ([address] )  Assessment & Plan  Malnutrition Findings:   Adult Malnutrition type: Chronic illness  Adult Degree of Malnutrition: Other severe protein calorie malnutrition  Malnutrition Characteristics: Muscle loss,Fat loss,Inadequate energy  ·     · Reports 15 lb weight loss over the last 1-2 months with poor PO intake  Cachectic appearing  · Consult placed to nutrition for further recommendations  · Currently NPO POD 1- hemicolectomy, continue IVF hydration           secondary to colonic malignancy  Currently NPO due to small-bowel obstruction  Continue IV fluid hydration      360 Statement: Chronic severe malnutrition related to decreased appetite, N/V, dry heaves, colonic mass as evidenced by < 75% estimated energy intake > 1 mo, Severe muscle loss to pectoralis, deltoid, gastrocnemius and quadriceps muscles + scapula region, severe fat loss to buccal pads  Treated with: Pt currently NPO for possible surgery pending family discussion  Once appropriate for oral diet, would recommend regular diet with ensure enlive TID  Consider appetite stimulant as medically appropriate  Recommend daily weights for nutrition monitoring  BMI Findings: Body mass index is 19 05 kg/m²  Malnutrition ([facility] [address] )  Assessment & Plan  Malnutrition Findings:   ·     BMI Findings: Body mass index is 19 05 kg/m²  JENSEN (acute kidney injury) [facility])  Assessment & Plan  Lab Results   Component Value Date    CREATININE 1 30 04/06/2022   Creatinine 1 39 on admission  Improved to 1 02 with IV fluid hydration  · Unclear baseline, no prior labs available in care everywhere  · Suspect prerenal secondary to dehyrdration given poor PO intake  · Patient received contrast for CT a/p, continue IVF hydration  · Trend BMP  · Avoid nephrotoxins, hypotension       Acute on chronic anemia  Assessment & Plan  · Hgb 11 2 on admission   · No baseline labs available  Hemoglobin dropped to 10 3 with hydration    Also due to underlying colonic malignancy  · Denies melena, hematochezia  · normal folate, low B12 and iron panel, occult stool pending  · Continue to trend CBC  · Consider transfusion <8        VTE Pharmacologic Prophylaxis: VTE Score: 3 Moderate Risk (Score 3-4) - Pharmacological DVT Prophylaxis Ordered: heparin  Patient Centered Rounds: I performed bedside rounds with nursing staff today  Discussions with Specialists or Other Care Team Provider: case management, surgery    Education and Discussions with Family / Patient: Updated  (son) at bedside  Time Spent for Care: 45 minutes  More than 50% of total time spent on counseling and coordination of care as described above  Current Length of Stay: 2 day(s)  Current Patient Status: Inpatient   Certification Statement: The patient will continue to require additional inpatient hospital stay due to POD1 hemicolectomy, NPO, acute/chronic anemai  Discharge Plan: Anticipate discharge in 48-72 hrs to discharge location to be determined pending rehab evaluations  Code Status: Level 1 - Full Code    Subjective:   Having pain, which is controlled with PRN morphine, currently 5/10 abdominal pain  Is passing flatus, no nausea or vomiting  NPO with sips of meds  Added moderate pain medications  Encourage IS  Denies chest pain, SOB    Objective:     Vitals:   Temp (24hrs), Av 3 °F (36 8 °C), Min:97 9 °F (36 6 °C), Max:99 1 °F (37 3 °C)    Temp:  [97 9 °F (36 6 °C)-99 1 °F (37 3 °C)] 98 °F (36 7 °C)  HR:  [] 78  Resp:  [16-20] 17  BP: (110-185)/(57-92) 110/57  SpO2:  [91 %-100 %] 99 %  Body mass index is 19 05 kg/m²  Input and Output Summary (last 24 hours): Intake/Output Summary (Last 24 hours) at 2022 1430  Last data filed at 2022 0900  Gross per 24 hour   Intake 2717 5 ml   Output 1340 ml   Net 1377 5 ml       Physical Exam:   Physical Exam  Vitals and nursing note reviewed  Constitutional:       Appearance: He is well-developed  HENT:      Head: Normocephalic and atraumatic  Mouth/Throat:      Mouth: Mucous membranes are dry  Eyes:      General: No scleral icterus       Conjunctiva/sclera: Conjunctivae normal       Pupils: Pupils are equal, round, and reactive to light  Cardiovascular:      Rate and Rhythm: Normal rate and regular rhythm  Pulses: Normal pulses  Heart sounds: No murmur heard  No gallop  Pulmonary:      Effort: Pulmonary effort is normal  No respiratory distress  Breath sounds: Normal breath sounds  No wheezing or rales  Abdominal:      General: Abdomen is flat  Bowel sounds are decreased  There is no distension  Palpations: Abdomen is soft  Tenderness: There is generalized abdominal tenderness  Musculoskeletal:      Cervical back: Neck supple  Right lower leg: No edema  Left lower leg: No edema  Skin:     General: Skin is warm and dry  Capillary Refill: Capillary refill takes 2 to 3 seconds  Coloration: Skin is pale  Neurological:      General: No focal deficit present  Mental Status: He is alert  Mental status is at baseline  Motor: No weakness  Psychiatric:         Mood and Affect: Mood normal          Additional Data:     Labs:  Results from last 7 days   Lab Units 04/06/22  0509 04/05/22  0517 04/05/22  0517   WBC Thousand/uL 10 95*   < > 8 79   HEMOGLOBIN g/dL 8 5*   < > 10 3*   HEMATOCRIT % 26 6*   < > 31 5*   PLATELETS Thousands/uL 202   < > 257   NEUTROS PCT %  --   --  76*   LYMPHS PCT %  --   --  7*   MONOS PCT %  --   --  15*   EOS PCT %  --   --  1    < > = values in this interval not displayed  Results from last 7 days   Lab Units 04/06/22  0509 04/05/22 0517 04/05/22  0517   SODIUM mmol/L 137   < > 137   POTASSIUM mmol/L 3 7   < > 3 3*   CHLORIDE mmol/L 106   < > 103   CO2 mmol/L 24   < > 27   BUN mg/dL 23   < > 17   CREATININE mg/dL 1 30   < > 1 02   ANION GAP mmol/L 7   < > 7   CALCIUM mg/dL 7 2*   < > 7 8*   ALBUMIN g/dL  --   --  2 9*   TOTAL BILIRUBIN mg/dL  --   --  0 34   ALK PHOS U/L  --   --  105   ALT U/L  --   --  19   AST U/L  --   --  21   GLUCOSE RANDOM mg/dL 128   < > 101    < > = values in this interval not displayed  Lines/Drains:  Invasive Devices  Report    Peripheral Intravenous Line            Peripheral IV 04/04/22 Right Antecubital 1 day    Peripheral IV 04/05/22 Left Hand <1 day          Drain            Urethral Catheter Non-latex 16 Fr  <1 day              Urinary Catheter:  Goal for removal: Voiding trial when ambulation improves               Imaging: Reviewed radiology reports from this admission including: chest CT scan, abdominal/pelvic CT and procedure reports    Recent Cultures (last 7 days):         Last 24 Hours Medication List:   Current Facility-Administered Medications   Medication Dose Route Frequency Provider Last Rate    cefazolin  2,000 mg Intravenous Once Doll Case, DO      cyanocobalamin  1,000 mcg Subcutaneous Daily Doll Case, DO      heparin (porcine)  5,000 Units Subcutaneous [facility] Doll Case, DO      morphine injection  4 mg Intravenous Q3H PRN Doll Case, DO      morphine  1 mg Intravenous Q3H PRN FILI Boothe      ondansetron  4 mg Intravenous Q6H PRN Doll Case, DO      sodium chloride  75 mL/hr Intravenous Continuous Doll Case, DO 75 mL/hr (04/06/22 0900)        Today, Patient Was Seen By: FILI Boothe    **Please Note: This note may have been constructed using a voice recognition system  **

## 2022-04-07 PROBLEM — B35.4 TINEA CORPORIS: Status: ACTIVE | Noted: 2022-04-07

## 2022-04-07 LAB
ANION GAP SERPL CALCULATED.3IONS-SCNC: 10 MMOL/L (ref 4–13)
BASOPHILS # BLD AUTO: 0.02 THOUSANDS/ΜL (ref 0–0.1)
BASOPHILS NFR BLD AUTO: 0 % (ref 0–1)
BUN SERPL-MCNC: 21 MG/DL (ref 5–25)
CALCIUM SERPL-MCNC: 7.8 MG/DL (ref 8.3–10.1)
CHLORIDE SERPL-SCNC: 107 MMOL/L (ref 100–108)
CO2 SERPL-SCNC: 24 MMOL/L (ref 21–32)
CREAT SERPL-MCNC: 1.02 MG/DL (ref 0.6–1.3)
EOSINOPHIL # BLD AUTO: 0.04 THOUSAND/ΜL (ref 0–0.61)
EOSINOPHIL NFR BLD AUTO: 0 % (ref 0–6)
ERYTHROCYTE [DISTWIDTH] IN BLOOD BY AUTOMATED COUNT: 14.9 % (ref 11.6–15.1)
GFR SERPL CREATININE-BSD FRML MDRD: 68 ML/MIN/1.73SQ M
GLUCOSE SERPL-MCNC: 88 MG/DL (ref 65–140)
HCT VFR BLD AUTO: 30.2 % (ref 36.5–49.3)
HGB BLD-MCNC: 9.8 G/DL (ref 12–17)
IMM GRANULOCYTES # BLD AUTO: 0.06 THOUSAND/UL (ref 0–0.2)
IMM GRANULOCYTES NFR BLD AUTO: 1 % (ref 0–2)
LYMPHOCYTES # BLD AUTO: 0.81 THOUSANDS/ΜL (ref 0.6–4.47)
LYMPHOCYTES NFR BLD AUTO: 9 % (ref 14–44)
MCH RBC QN AUTO: 29.1 PG (ref 26.8–34.3)
MCHC RBC AUTO-ENTMCNC: 32.5 G/DL (ref 31.4–37.4)
MCV RBC AUTO: 90 FL (ref 82–98)
MONOCYTES # BLD AUTO: 1.03 THOUSAND/ΜL (ref 0.17–1.22)
MONOCYTES NFR BLD AUTO: 11 % (ref 4–12)
NEUTROPHILS # BLD AUTO: 7.53 THOUSANDS/ΜL (ref 1.85–7.62)
NEUTS SEG NFR BLD AUTO: 79 % (ref 43–75)
NRBC BLD AUTO-RTO: 0 /100 WBCS
PLATELET # BLD AUTO: 226 THOUSANDS/UL (ref 149–390)
PMV BLD AUTO: 8.8 FL (ref 8.9–12.7)
POTASSIUM SERPL-SCNC: 3.5 MMOL/L (ref 3.5–5.3)
RBC # BLD AUTO: 3.37 MILLION/UL (ref 3.88–5.62)
SODIUM SERPL-SCNC: 141 MMOL/L (ref 136–145)
WBC # BLD AUTO: 9.49 THOUSAND/UL (ref 4.31–10.16)

## 2022-04-07 PROCEDURE — C9113 INJ PANTOPRAZOLE SODIUM, VIA: HCPCS

## 2022-04-07 PROCEDURE — 85025 COMPLETE CBC W/AUTO DIFF WBC: CPT | Performed by: PHYSICIAN ASSISTANT

## 2022-04-07 PROCEDURE — 99232 SBSQ HOSP IP/OBS MODERATE 35: CPT

## 2022-04-07 PROCEDURE — 97535 SELF CARE MNGMENT TRAINING: CPT

## 2022-04-07 PROCEDURE — 80048 BASIC METABOLIC PNL TOTAL CA: CPT | Performed by: PHYSICIAN ASSISTANT

## 2022-04-07 PROCEDURE — 97116 GAIT TRAINING THERAPY: CPT

## 2022-04-07 PROCEDURE — 97167 OT EVAL HIGH COMPLEX 60 MIN: CPT

## 2022-04-07 PROCEDURE — 97163 PT EVAL HIGH COMPLEX 45 MIN: CPT

## 2022-04-07 RX ORDER — HYDROMORPHONE HCL IN WATER/PF 6 MG/30 ML
0.2 PATIENT CONTROLLED ANALGESIA SYRINGE INTRAVENOUS EVERY 4 HOURS PRN
Status: DISCONTINUED | OUTPATIENT
Start: 2022-04-07 | End: 2022-04-10

## 2022-04-07 RX ORDER — OXYCODONE HYDROCHLORIDE 5 MG/1
2.5 TABLET ORAL EVERY 4 HOURS PRN
Status: DISCONTINUED | OUTPATIENT
Start: 2022-04-07 | End: 2022-04-08

## 2022-04-07 RX ORDER — OXYCODONE HYDROCHLORIDE 5 MG/1
5 TABLET ORAL EVERY 4 HOURS PRN
Status: DISCONTINUED | OUTPATIENT
Start: 2022-04-07 | End: 2022-04-08

## 2022-04-07 RX ADMIN — HEPARIN SODIUM 5000 UNITS: 5000 INJECTION INTRAVENOUS; SUBCUTANEOUS at 21:01

## 2022-04-07 RX ADMIN — SODIUM CHLORIDE 75 ML/HR: 0.9 INJECTION, SOLUTION INTRAVENOUS at 13:50

## 2022-04-07 RX ADMIN — SODIUM CHLORIDE 75 ML/HR: 0.9 INJECTION, SOLUTION INTRAVENOUS at 00:53

## 2022-04-07 RX ADMIN — HEPARIN SODIUM 5000 UNITS: 5000 INJECTION INTRAVENOUS; SUBCUTANEOUS at 05:51

## 2022-04-07 RX ADMIN — PANTOPRAZOLE SODIUM 40 MG: 40 INJECTION, POWDER, FOR SOLUTION INTRAVENOUS at 09:08

## 2022-04-07 RX ADMIN — CYANOCOBALAMIN 1000 MCG: 1000 INJECTION INTRAMUSCULAR; SUBCUTANEOUS at 09:08

## 2022-04-07 RX ADMIN — MICONAZOLE NITRATE: 2 CREAM TOPICAL at 19:05

## 2022-04-07 RX ADMIN — HEPARIN SODIUM 5000 UNITS: 5000 INJECTION INTRAVENOUS; SUBCUTANEOUS at 13:51

## 2022-04-07 NOTE — PROGRESS NOTES
(rogress Note - General Surgery   Lay Doyle  81 y o  male MRN: [id number]  Unit/Bed#: -01 Encounter: [id number]    Assessment:  POD2 s/p exploratory laparotomy and right hemicolectomy with anastomosis  Afebrile, VSS  WBC 9 49 (10 59)  Hgb improved 9 8 (8 5, 10 3)  CEA 3 5  Surgical pathology pending  Making adequate urine      Plan:  NPO sips with meds  IVF hydration  Monitor I/Os  Discontinue franco catheter this morning  Follow qAM CBC and BMP  Follow surgical pathology  OOB today with PT/OT  Heparin anticoagulation  SCDs  Mepilex dressing to stay in place today  May shower, Mepilex dressing is water resistent  Management of comorbidities per primary team    Subjective/Objective     Subjective: No acute events  States that his pain is well controlled  Denies n/v  Denies fevers/chills  Denies chest pain or short of breath    Objective:   Blood pressure 120/64, pulse 79, temperature 98 6 °F (37 °C), temperature source Oral, resp  rate 17, height 5' 6" (1 676 m), weight 53 5 kg (118 lb), SpO2 98 %  ,Body mass index is 19 05 kg/m²  Intake/Output Summary (Last 24 hours) at 4/7/2022 0710  Last data filed at 4/7/2022 0601  Gross per 24 hour   Intake 2181 25 ml   Output 1450 ml   Net 731 25 ml       Invasive Devices  Report    Peripheral Intravenous Line            Peripheral IV 04/04/22 Right Antecubital 2 days    Peripheral IV 04/05/22 Left Hand 1 day          Drain            Urethral Catheter Non-latex 16 Fr  1 day                Physical Exam:  Gen: AxOx3, no distress noted, resting comfortably in bed  Heart: RRR  Lungs: CTA  Abd: soft, there is appropriate incisional tenderness, non distended, bowel sounds are present, Mepilex dressing in place with a small area of strike through noted  Lab, Imaging and other studies:  I have personally reviewed pertinent lab results      CBC:   Lab Results   Component Value Date    WBC 9 49 04/07/2022    HGB 9 8 (L) 04/07/2022    HCT 30 2 (L) 04/07/2022 MCV 90 04/07/2022     04/07/2022    MCH 29 1 04/07/2022    MCHC 32 5 04/07/2022    RDW 14 9 04/07/2022    MPV 8 8 (L) 04/07/2022    NRBC 0 04/07/2022     CMP:   Results for Ree Kawasaki (MRN [id number]) as of 4/7/2022 07:52   Ref   Range 4/7/2022 05:53   Sodium Latest Ref Range: 136 - 145 mmol/L 141   Potassium Latest Ref Range: 3 5 - 5 3 mmol/L 3 5   Chloride Latest Ref Range: 100 - 108 mmol/L 107   CO2 Latest Ref Range: 21 - 32 mmol/L 24   Anion Gap Latest Ref Range: 4 - 13 mmol/L 10   BUN Latest Ref Range: 5 - 25 mg/dL 21   Creatinine Latest Ref Range: 0 60 - 1 30 mg/dL 1 02   Glucose, Random Latest Ref Range: 65 - 140 mg/dL 88   Calcium Latest Ref Range: 8 3 - 10 1 mg/dL 7 8 (L)   eGFR Latest Units: ml/min/1 73sq m 68       VTE Pharmacologic Prophylaxis: Heparin  VTE Mechanical Prophylaxis: sequential compression device       Britney Bledsoe PA-C

## 2022-04-07 NOTE — PLAN OF CARE
Problem: PHYSICAL THERAPY ADULT  Goal: Performs mobility at highest level of function for planned discharge setting  See evaluation for individualized goals  Description: Treatment/Interventions: Functional transfer training,LE strengthening/ROM,Elevations,Therapeutic exercise,Endurance training,Patient/family training,Equipment eval/education,Bed mobility,Gait training,Compensatory technique education,Spoke to nursing,Spoke to case management,OT  Equipment Recommended: John       See flowsheet documentation for full assessment, interventions and recommendations  8/2/1039 [id number] by Jaylon Mandujano PT  Note: Prognosis: Good  Problem List: Decreased strength,Decreased endurance,Impaired balance,Decreased mobility,Impaired judgement,Decreased safety awareness,Decreased skin integrity,Pain  Pt tolerated session fairly  He was able to transfer and ambulate with decreased assistance with use of RW compared to no AD  He ambulated increased distance, but requires cues for improved gait pattern  He is limited by decreased strength, balance endurance  He will continue to benefit from PT services to maximize LOF  Barriers to Discharge: Decreased caregiver support,Inaccessible home environment  Barriers to Discharge Comments: resides with son  pt reports son can assist prn  has 2nd floor bathroom  PT Discharge Recommendation: Home with home health rehabilitation          See flowsheet documentation for full assessment

## 2022-04-07 NOTE — PLAN OF CARE
Problem: PHYSICAL THERAPY ADULT  Goal: Performs mobility at highest level of function for planned discharge setting  See evaluation for individualized goals  Description: Treatment/Interventions: Functional transfer training,LE strengthening/ROM,Elevations,Therapeutic exercise,Endurance training,Patient/family training,Equipment eval/education,Bed mobility,Gait training,Compensatory technique education,Spoke to nursing,Spoke to case management,OT  Equipment Recommended: Jack Riley       See flowsheet documentation for full assessment, interventions and recommendations  Note: Prognosis: Good  Problem List: Decreased strength,Decreased endurance,Impaired balance,Decreased mobility,Impaired judgement,Decreased safety awareness,Decreased skin integrity,Pain  Assessment:  Pt is a 81 y o  male seen for PT evaluation s/p admission to [address] [place] [address] on 4/4/2022 with Small bowel obstruction ([facility] [address] )  Order placed for PT services  Upon evaluation: Pt is presenting with impaired functional mobility due to pain, decreased strength, decreased endurance, impaired balance, gait deviations, decreased safety awareness, impaired judgment, fall risk and impaired skin integrity requiring steadying to minimal assistance for transfers and minimal assistance for ambulation with out AD   Pt's clinical presentation is currently unpredictable given the functional mobility deficits above, especially weakness, decreased skin integrity, decreased endurance, gait deviations, pain, decreased activity tolerance, decreased functional mobility tolerance, decreased safety awareness and impaired judgement, coupled with fall risks as indicated by AM-PAC 6-Clicks: 75/44 as well as impaired balance, polypharmacy, impaired judgement and decreased safety awareness and combined with medical complications of pain impacting overall mobility status, abnormal H&H, abnormal WBCs, abnormal potassium values, need for input for mobility technique/safety and Colonic mass involving the hepatic flexure where there is marked circumferential wall thickening and significant luminal narrowing, with multiple fluid-filled dilated loops of distal small bowel, and fluid distended proximal colon compatible with obstruction now s/p exploratory laparotomy, right hemicolectomy, JENSEN  Pt's PMHx and comorbidities that may affect physical performance and progress include: anemia  Personal factors affecting pt at time of IE include: step(s) to enter environment, multi-level environment, inability to perform IADLs, inability to perform ADLs, inability to navigate level surfaces without external assistance and inability to ambulate household distances  Pt will benefit from continued skilled PT services to address deficits as defined above and to maximize level of functional mobility to facilitate return toward PLOF and improved QOL  From PT/mobility standpoint, recommendation at time of d/c would be Home PT, home with family support and with walker pending progress in order to reduce fall risk and maximize pt's functional independence and consistency with mobility in order to facilitate return to PLOF  Recommend trial with walker next 1-2 sessions and ther ex next 1-2 sessions  Barriers to Discharge: Decreased caregiver support,Inaccessible home environment  Barriers to Discharge Comments: resides with son  pt reports son can assist prn  has 2nd floor bathroom  PT Discharge Recommendation: Home with home health rehabilitation          See flowsheet documentation for full assessment

## 2022-04-07 NOTE — PHYSICAL THERAPY NOTE
PHYSICAL THERAPY EVALUATION  NAME:  Anabella Estevez Sr  DATE: 04/07/22    AGE:   81 y o  Mrn:   [id number]  ADMIT DX:  Loss of appetite [R63 0]  Large bowel obstruction ([address] ) [K56 609]  Colonic mass [K63 89]    Past Medical History:   Diagnosis Date    GERD (gastroesophageal reflux disease)      Length Of Stay: 3  Performed at least 2 patient identifiers during session: Name and Birthday  PHYSICAL THERAPY EVALUATION :      04/07/22 0942   PT Last Visit   PT Visit Date 04/07/22   Note Type   Note type Evaluation   Pain Assessment   Pain Assessment Tool 0-10   Pain Rating: FLACC (Rest) - Face 0   Pain Rating: FLACC (Rest) - Legs 0   Pain Rating: FLACC (Rest) - Activity 0   Pain Rating: FLACC (Rest) - Cry 0   Pain Rating: FLACC (Rest) - Consolability 0   Score: FLACC (Rest) 0   Pain Rating: FLACC (Activity) - Face 1   Pain Rating: FLACC (Activity) - Legs 0   Pain Rating: FLACC (Activity) - Activity 0   Pain Rating: FLACC (Activity) - Cry 1   Pain Rating: FLACC (Activity) - Consolability 0   Score: FLACC (Activity) 2   Restrictions/Precautions   Other Precautions Chair Alarm; Bed Alarm; Fall Risk;Pain;Multiple lines   Home Living   Type of Home House  (1 +1 BRITTANY)   Home Layout Two level  (bathroom in basement and 2nd floor, FF L HR)   Bathroom Shower/Tub   (sponge bathes)   Bathroom Toilet Standard   Bathroom Equipment Commode  (wasn't using it)   [facility]  (wasn't his)   Additional Comments Reports living in a [facility] with 1+1 BRITTANY and FF with R HR down to basement bathroom and FF L HR to 2nd floor bathroom  Reports completing steps to use the bathroom  Doesn't use an AD PTA  Reports having a BSC at home, but wasn't using it     Prior Function   Level of [place] Independent with ADLs and functional mobility   Lives With Son   Receives Help From Family   ADL Assistance Independent   IADLs Independent  (son helps prn)   Falls in the last 6 months 0   Comments Reports being independent with mobility, ADLs and IADLs  Has assistance from son prn  General   Additional Pertinent History LAPAROTOMY EXPLORATORY, RIGHT DWAYNE COLECTOMY WITH ANASTOMOSIS (N/A) on 4/5/2022   Cognition   Orientation Level Oriented X4   Following Commands Follows one step commands without difficulty   Comments easily distracted, difficulty explaining home set up   Subjective   Subjective "My son can help me"   RLE Assessment   RLE Assessment WFL  (4/5 except hip flexion 3+/5 due to pain)   LLE Assessment   LLE Assessment WFL  (4/5 except hip flexion 3-/5 due to pain)   Coordination   Rapid Alternating Movements Intact   Light Touch   RLE Light Touch Grossly intact   LLE Light Touch Grossly intact   Bed Mobility   Additional Comments Pt sitting OOB upon arrival to room   Transfers   Sit to Stand   (steadying assistance)   Additional items Increased time required;Verbal cues   Stand to Sit   (SBA)   Additional items Increased time required;Verbal cues   Stand pivot 4  Minimal assistance   Additional items Assist x 1; Increased time required;Verbal cues   Additional Comments no AD  sit to stand with steadying assistance with wide NESHA and inc time  spt without AD with minAx1 with scissoring and cues for turnign completely prior to sitting  stand to sit with SBA with cues for controlled descent  Ambulation/Elevation   Gait pattern Narrow NESHA;Scissoring;Decreased foot clearance; Excessively slow; Short stride   Gait Assistance 4  Minimal assist   Additional items Assist x 1;Verbal cues   Assistive Device None   Distance 20'x1 and 30'x1 without AD with minx1 with varied NESHA, scissoring, decreased foot clearance and increased path deviation   cues for inc step length, NESHA and foot clearance   Balance   Static Sitting Good   Dynamic Sitting Fair +   Static Standing Fair   Dynamic Standing Fair -   Ambulatory Poor +   Activity Tolerance   Activity Tolerance Patient limited by fatigue;Patient limited by pain   Medical Staff Made Aware OT, Addison Robledo   Nurse Made Aware RN, Rosalind   Assessment   Prognosis Good   Problem List Decreased strength;Decreased endurance; Impaired balance;Decreased mobility; Impaired judgement;Decreased safety awareness;Decreased skin integrity;Pain   Barriers to Discharge Decreased caregiver support; Inaccessible home environment   Barriers to Discharge Comments resides with son  pt reports son can assist prn  has 2nd floor bathroom  Goals   Patient Goals "Go home"   STG Expiration Date 04/21/22   PT Treatment Day 1   Plan   Treatment/Interventions Functional transfer training;LE strengthening/ROM; Elevations; Therapeutic exercise; Endurance training;Patient/family training;Equipment eval/education; Bed mobility;Gait training; Compensatory technique education;Spoke to nursing;Spoke to case management;OT   PT Frequency 3-5x/wk   Recommendation   PT Discharge Recommendation Home with home health rehabilitation   Equipment Recommended [facility] Recommended Wheeled walker   Change/add to [employer]? No   Additional Comments increased support from son for mobility and assistance with steps  Pt verbalized understanding  however should family not be able to provide support and assistance, may need to consider post acute rehab   AM-PAC Basic Mobility Inpatient   Turning in Bed Without Bedrails 3   Lying on Back to Sitting on Edge of Flat Bed 3   Moving Bed to Chair 3   Standing Up From Chair 3   Walk in Room 3   Climb 3-5 Stairs 3   Basic Mobility Inpatient Raw Score 18   Basic Mobility Standardized Score 41 05   Highest Level Of Mobility   JH-HLM Goal 6: Walk 10 steps or more   JH-HLM Highest Level of Mobility 7: Walk 25 feet or more   JH-HLM Goal Achieved Yes   Additional Treatment Session   Start Time 0956   End Time 1006   Treatment Assessment Pt tolerated session fairly  He was able to transfer and ambulate with decreased assistance with use of RW compared to no AD   He ambulated increased distance, but requires cues for improved gait pattern  He is limited by decreased strength, balance endurance  He will continue to benefit from PT services to maximize LOF  Equipment Use with HOB flat without bedrail  completed sit<>supine with Supervision with increased time wiht min cues for sequence and technique  initiated RW  min cues for hand placement for safety with RW  sit<>stand with SBA  spt with RW with SBA with increased time with min cues for turing completely prior to sitting  ambulated 100'x1 with RW with SBA with slow tiffanie, scissoring and decreased step length  verbal cues for increased NESHA, no scissoring  End of Consult   Patient Position at End of Consult Bedside chair;Bed/Chair alarm activated; All needs within reach     (Please find full objective findings from PT assessment regarding body systems outlined above)  Assessment: Pt is a 81 y o  male seen for PT evaluation s/p admission to [address] [place] [address] on 4/4/2022 with Small bowel obstruction ([facility] [address] )  Order placed for PT services  Upon evaluation: Pt is presenting with impaired functional mobility due to pain, decreased strength, decreased endurance, impaired balance, gait deviations, decreased safety awareness, impaired judgment, fall risk and impaired skin integrity requiring steadying to minimal assistance for transfers and minimal assistance for ambulation with out AD   Pt's clinical presentation is currently unpredictable given the functional mobility deficits above, especially weakness, decreased skin integrity, decreased endurance, gait deviations, pain, decreased activity tolerance, decreased functional mobility tolerance, decreased safety awareness and impaired judgement, coupled with fall risks as indicated by AM-PAC 6-Clicks: 79/95 as well as impaired balance, polypharmacy, impaired judgement and decreased safety awareness and combined with medical complications of pain impacting overall mobility status, abnormal H&H, abnormal WBCs, abnormal potassium values, need for input for mobility technique/safety and Colonic mass involving the hepatic flexure where there is marked circumferential wall thickening and significant luminal narrowing, with multiple fluid-filled dilated loops of distal small bowel, and fluid distended proximal colon compatible with obstruction now s/p exploratory laparotomy, right hemicolectomy, JENSEN  Pt's PMHx and comorbidities that may affect physical performance and progress include: anemia  Personal factors affecting pt at time of IE include: step(s) to enter environment, multi-level environment, inability to perform IADLs, inability to perform ADLs, inability to navigate level surfaces without external assistance and inability to ambulate household distances  Pt will benefit from continued skilled PT services to address deficits as defined above and to maximize level of functional mobility to facilitate return toward PLOF and improved QOL  From PT/mobility standpoint, recommendation at time of d/c would be Home PT, home with family support and with walker pending progress in order to reduce fall risk and maximize pt's functional independence and consistency with mobility in order to facilitate return to PLOF  Recommend trial with walker next 1-2 sessions and ther ex next 1-2 sessions  The patient's AM-PAC Basic Mobility Inpatient Short Form Raw Score is 18  A Raw score of greater than 16 suggests the patient may benefit from discharge to home  Please also refer to the recommendation of the Physical Therapist for safe discharge planning  Goals: Pt will: Perform bed mobility tasks with modified I to reposition in bed and prepare for transfers  Pt will perform transfers with modified I to decrease burden of care, decrease risk for falls and improve activity tolerance and prepare for ambulation   Pt will ambulate with LRAD for >/= 250' with  modified I  to decrease burden of care, decrease risk for falls and improve activity tolerance and to access home environment  Pt will complete >/= 12 steps with with unilateral handrail with Supervision to decrease burden of care, return to home with BRITTANY, return to [facility] home, decrease risk for falls and improve activity tolerance  Pt will participate in objective balance assessment to determine baseline fall risk  Pt will increase B LE strength >/= 1/2 MMT grade to facilitate functional mobility        Marilia Navarrete, PT,DPT

## 2022-04-07 NOTE — PROGRESS NOTES
[address]  Progress Note Rhoda Yanez Sr  1940, 81 y o  male MRN: [id number]  Unit/Bed#: -01 Encounter: [id number]  Primary Care Provider: No primary care provider on file  Date and time admitted to hospital: 4/4/2022  7:28 PM    * Small bowel obstruction [facility])  Assessment & Plan  · Presents from home with nausea, dry heaves,hiccups, loss of appetite and 15 lb weight loss over the last month  Reports father with history of colon cancer diagnosed at age 81  · No prior colonoscopies or cancer history per patient and family  Denies medical/surgical history  Has not seen doctors or followed up with lab testing or screening test in years  · CT a/p w contrast: "Colonic mass involving the hepatic flexure where there is marked circumferential wall thickening and significant luminal narrowing with multiple fluid-filled dilated loops of since distal small bowel, and fluid distended proximal colon compatible with obstruction  No pneumatosis or free air  No definite metastatic disease  Haziness involving the omentum in the right upper quadrant adjacent to the mass which is nonspecific but is concerning for localized carcinomatosis "  · CT chest negative for malignancy  · POD1: hemicolectomy w/anatamosis  · Keep NPO/sips with meds  · continue IVF hydration  · Appreciate general surgery recommendations  · Discussed with patient at bedside and with daughter over the phone  Currently patient is agreeable for surgical intervention  Patient is medically cleared for surgery  Tinea corporis  Assessment & Plan  · Noted bilateral lower extremity circular rash erythematous border with central clearing, covering bilateral shins, lower extremities  Patient states it has been there for a while and noted following [place]  He states that it is itchy at times    · Rash appears to be ringworm given the appearance, and spread isolated to lower extremities  · Miconazole cream b i d  To affected area    Colonic mass  Assessment & Plan  · Obstructing mass distal to hepatic flexure  · POD#1 S/p ex lap, right hemicolectomy with anastomosis  · Surgical pathology pending    Severe protein-calorie malnutrition ([address] )  Assessment & Plan  Malnutrition Findings:   Adult Malnutrition type: Chronic illness  Adult Degree of Malnutrition: Other severe protein calorie malnutrition  Malnutrition Characteristics: Muscle loss,Fat loss,Inadequate energy  ·     · Reports 15 lb weight loss over the last 1-2 months with poor PO intake  Cachectic appearing  · Consult placed to nutrition for further recommendations  · Currently NPO POD 1- hemicolectomy, continue IVF hydration   · Advanced to clear liquid diet per surgery recommendation          secondary to colonic malignancy  Currently NPO due to small-bowel obstruction  Continue IV fluid hydration      360 Statement: Chronic severe malnutrition related to decreased appetite, N/V, dry heaves, colonic mass as evidenced by < 75% estimated energy intake > 1 mo, Severe muscle loss to pectoralis, deltoid, gastrocnemius and quadriceps muscles + scapula region, severe fat loss to buccal pads  Treated with: Pt currently NPO for possible surgery pending family discussion  Once appropriate for oral diet, would recommend regular diet with ensure enlive TID  Consider appetite stimulant as medically appropriate  Recommend daily weights for nutrition monitoring  BMI Findings: Body mass index is 19 05 kg/m²  JENSEN (acute kidney injury) [facility])  Assessment & Plan  Lab Results   Component Value Date    CREATININE 1 02 04/07/2022   Creatinine 1 39 on admission  Improved to 1 02 with IV fluid hydration  · Unclear baseline, no prior labs available in care everywhere     · Suspect prerenal secondary to dehyrdration given poor PO intake  · Patient received contrast for CT a/p, continue IVF hydration unti tolerating PO  · Trend BMP  · Avoid nephrotoxins, hypotension       Acute on chronic anemia  Assessment & Plan  · Hgb 11 2 on admission   · No baseline labs available  Hemoglobin dropped to 10 3 with hydration  Also due to underlying colonic malignancy  · Denies melena, hematochezia  · normal folate, low B12 and iron panel, occult stool pending  · Continue to trend CBC  · Consider transfusion <8        VTE Pharmacologic Prophylaxis: VTE Score: 3 Moderate Risk (Score 3-4) - Pharmacological DVT Prophylaxis Ordered: heparin  Patient Centered Rounds: I performed bedside rounds with nursing staff today  Discussions with Specialists or Other Care Team Provider: General surgery, case management    Education and Discussions with Family / Patient: Attempted to update  (son) via phone  Left voicemail  Time Spent for Care: 30 minutes  More than 50% of total time spent on counseling and coordination of care as described above  Current Length of Stay: 3 day(s)  Current Patient Status: Inpatient   Certification Statement: The patient will continue to require additional inpatient hospital stay due to s/p hemicolectomy, advanced diet clears today further if tolerated tomorrow  Discharge Plan: Anticipate discharge in 48-72 hrs to rehab facility  Code Status: Level 1 - Full Code    Subjective:   Patient out of bed in chair this afternoon, states he is feeling much better than previous day  Pain is better controlled  States that he is tolerating sips of clears with medications, but did not have any bowel movements, and is not passing gas  Objective:     Vitals:   Temp (24hrs), Av 3 °F (36 8 °C), Min:97 4 °F (36 3 °C), Max:98 8 °F (37 1 °C)    Temp:  [97 4 °F (36 3 °C)-98 8 °F (37 1 °C)] 98 8 °F (37 1 °C)  HR:  [62-79] 78  Resp:  [17-18] 17  BP: (118-138)/(64-80) 138/80  SpO2:  [96 %-99 %] 99 %  Body mass index is 19 05 kg/m²  Input and Output Summary (last 24 hours):      Intake/Output Summary (Last 24 hours) at 2022 1524  Last data filed at 4/7/2022 1428  Gross per 24 hour   Intake 2282 5 ml   Output 2550 ml   Net -267 5 ml       Physical Exam:   Physical Exam  Vitals and nursing note reviewed  Constitutional:       Appearance: He is well-developed  HENT:      Head: Normocephalic and atraumatic  Mouth/Throat:      Mouth: Mucous membranes are moist       Pharynx: Oropharynx is clear  Eyes:      General: No scleral icterus  Extraocular Movements: Extraocular movements intact  Conjunctiva/sclera: Conjunctivae normal       Pupils: Pupils are equal, round, and reactive to light  Cardiovascular:      Rate and Rhythm: Normal rate and regular rhythm  Heart sounds: No murmur heard  Pulmonary:      Effort: Pulmonary effort is normal  No respiratory distress  Breath sounds: Normal breath sounds  No wheezing or rales  Abdominal:      General: Bowel sounds are decreased  There is no distension  Palpations: Abdomen is soft  Tenderness: There is abdominal tenderness (incisional)  There is no guarding  Musculoskeletal:      Cervical back: Neck supple  Skin:     General: Skin is warm and dry  Capillary Refill: Capillary refill takes less than 2 seconds  Coloration: Skin is not pale  Findings: Rash (Bilateral lower extremity) present  Neurological:      General: No focal deficit present  Mental Status: He is alert  Mental status is at baseline  Motor: Weakness present  Psychiatric:         Mood and Affect: Mood normal          Thought Content:  Thought content normal          Additional Data:     Labs:  Results from last 7 days   Lab Units 04/07/22  0553   WBC Thousand/uL 9 49   HEMOGLOBIN g/dL 9 8*   HEMATOCRIT % 30 2*   PLATELETS Thousands/uL 226   NEUTROS PCT % 79*   LYMPHS PCT % 9*   MONOS PCT % 11   EOS PCT % 0     Results from last 7 days   Lab Units 04/07/22  0553 04/06/22  0509 04/05/22  0517   SODIUM mmol/L 141   < > 137   POTASSIUM mmol/L 3 5   < > 3 3*   CHLORIDE mmol/L 107   < > 103   CO2 mmol/L 24   < > 27   BUN mg/dL 21   < > 17   CREATININE mg/dL 1 02   < > 1 02   ANION GAP mmol/L 10   < > 7   CALCIUM mg/dL 7 8*   < > 7 8*   ALBUMIN g/dL  --   --  2 9*   TOTAL BILIRUBIN mg/dL  --   --  0 34   ALK PHOS U/L  --   --  105   ALT U/L  --   --  19   AST U/L  --   --  21   GLUCOSE RANDOM mg/dL 88   < > 101    < > = values in this interval not displayed  Lines/Drains:  Invasive Devices  Report    Peripheral Intravenous Line            Peripheral IV 04/04/22 Right Antecubital 2 days    Peripheral IV 04/05/22 Left Hand 1 day                      Imaging: Reviewed radiology reports from this admission including: chest CT scan and abdominal/pelvic CT    Recent Cultures (last 7 days):         Last 24 Hours Medication List:   Current Facility-Administered Medications   Medication Dose Route Frequency Provider Last Rate    cyanocobalamin  1,000 mcg Subcutaneous Daily Javier Andre DO      heparin (porcine)  5,000 Units Subcutaneous [facility] Javier Andre DO      miconazole   Topical BID FILI Jimenez      morphine  1 mg Intravenous Q3H PRN FILI Jimenez      morphine injection  2 mg Intravenous Q3H PRN Jenifer Cannon PA-C      ondansetron  4 mg Intravenous Q6H PRN Javier Andre DO      pantoprazole  40 mg Intravenous Q24H [address] FILI Jimenez      sodium chloride  75 mL/hr Intravenous Continuous Javier Andre DO 75 mL/hr (04/07/22 1350)        Today, Patient Was Seen By: FILI Jimenez    **Please Note: This note may have been constructed using a voice recognition system  **

## 2022-04-07 NOTE — PLAN OF CARE
Problem: MOBILITY - ADULT  Goal: Maintain or return to baseline ADL function  Description: INTERVENTIONS:  -  Assess patient's ability to carry out ADLs; assess patient's baseline for ADL function and identify physical deficits which impact ability to perform ADLs (bathing, care of mouth/teeth, toileting, grooming, dressing, etc )  - Assess/evaluate cause of self-care deficits   - Assess range of motion  - Assess patient's mobility; develop plan if impaired  - Assess patient's need for assistive devices and provide as appropriate  - Encourage maximum independence but intervene and supervise when necessary  - Involve family in performance of ADLs  - Assess for home care needs following discharge   - Consider OT consult to assist with ADL evaluation and planning for discharge  - Provide patient education as appropriate  Outcome: Progressing  Goal: Maintains/Returns to pre admission functional level  Description: INTERVENTIONS:  - Perform BMAT or MOVE assessment daily    - Set and communicate daily mobility goal to care team and patient/family/caregiver  - Collaborate with rehabilitation services on mobility goals if consulted  - Perform Range of Motion   times a day  - Reposition patient every   hours  - Dangle patient   times a day  - Stand patient   times a day  - Ambulate patient   times a day  - Out of bed to chair   times a day   - Out of bed for meals    times a day  - Out of bed for toileting  - Record patient progress and toleration of activity level   Outcome: Progressing     Problem: Potential for Falls  Goal: Patient will remain free of falls  Description: INTERVENTIONS:  - Educate patient/family on patient safety including physical limitations  - Instruct patient to call for assistance with activity   - Consult OT/PT to assist with strengthening/mobility   - Keep Call bell within reach  - Keep bed low and locked with side rails adjusted as appropriate  - Keep care items and personal belongings within reach  - Initiate and maintain comfort rounds  - Make Fall Risk Sign visible to staff  - Offer Toileting every   Hours, in advance of need  - Initiate/Maintain   alarm  - Obtain necessary fall risk management equipment:   - Apply yellow socks and bracelet for high fall risk patients  - Consider moving patient to room near nurses station  Outcome: Progressing     Problem: Prexisting or High Potential for Compromised Skin Integrity  Goal: Skin integrity is maintained or improved  Description: INTERVENTIONS:  - Identify patients at risk for skin breakdown  - Assess and monitor skin integrity  - Assess and monitor nutrition and hydration status  - Monitor labs   - Assess for incontinence   - Turn and reposition patient  - Assist with mobility/ambulation  - Relieve pressure over bony prominences  - Avoid friction and shearing  - Provide appropriate hygiene as needed including keeping skin clean and dry  - Evaluate need for skin moisturizer/barrier cream  - Collaborate with interdisciplinary team   - Patient/family teaching  - Consider wound care consult   Outcome: Progressing     Problem: Nutrition/Hydration-ADULT  Goal: Nutrient/Hydration intake appropriate for improving, restoring or maintaining nutritional needs  Description: Monitor and assess patient's nutrition/hydration status for malnutrition  Collaborate with interdisciplinary team and initiate plan and interventions as ordered  Monitor patient's weight and dietary intake as ordered or per policy  Utilize nutrition screening tool and intervene as necessary  Determine patient's food preferences and provide high-protein, high-caloric foods as appropriate       INTERVENTIONS:  - Monitor oral intake, urinary output, labs, and treatment plans  - Assess nutrition and hydration status and recommend course of action  - Evaluate amount of meals eaten  - Assist patient with eating if necessary   - Allow adequate time for meals  - Recommend/ encourage appropriate diets, oral nutritional supplements, and vitamin/mineral supplements  - Order, calculate, and assess calorie counts as needed  - Recommend, monitor, and adjust tube feedings and TPN/PPN based on assessed needs  - Assess need for intravenous fluids  - Provide specific nutrition/hydration education as appropriate  - Include patient/family/caregiver in decisions related to nutrition  Outcome: Progressing     Problem: SAFETY,RESTRAINT: NV/NON-SELF DESTRUCTIVE BEHAVIOR  Goal: Remains free of harm/injury (restraint for non violent/non self-detsructive behavior)  Description: INTERVENTIONS:  - Instruct patient/family regarding restraint use   - Assess and monitor physiologic and psychological status   - Provide interventions and comfort measures to meet assessed patient needs   - Identify and implement measures to help patient regain control  - Assess readiness for release of restraint   Outcome: Progressing  Goal: Returns to optimal restraint-free functioning  Description: INTERVENTIONS:  - Assess the patient's behavior and symptoms that indicate continued need for restraint  - Identify and implement measures to help patient regain control  - Assess readiness for release of restraint   Outcome: Progressing     Problem: PAIN - ADULT  Goal: Verbalizes/displays adequate comfort level or baseline comfort level  Description: Interventions:  - Encourage patient to monitor pain and request assistance  - Assess pain using appropriate pain scale  - Administer analgesics based on type and severity of pain and evaluate response  - Implement non-pharmacological measures as appropriate and evaluate response  - Consider cultural and social influences on pain and pain management  - Notify physician/advanced practitioner if interventions unsuccessful or patient reports new pain  Outcome: Progressing     Problem: GASTROINTESTINAL - ADULT  Goal: Minimal or absence of nausea and/or vomiting  Description: INTERVENTIONS:  - Administer IV fluids if ordered to ensure adequate hydration  - Maintain NPO status until nausea and vomiting are resolved  - Nasogastric tube if ordered  - Administer ordered antiemetic medications as needed  - Provide nonpharmacologic comfort measures as appropriate  - Advance diet as tolerated, if ordered  - Consider nutrition services referral to assist patient with adequate nutrition and appropriate food choices  Outcome: Progressing  Goal: Maintains or returns to baseline bowel function  Description: INTERVENTIONS:  - Assess bowel function  - Encourage oral fluids to ensure adequate hydration  - Administer IV fluids if ordered to ensure adequate hydration  - Administer ordered medications as needed  - Encourage mobilization and activity  - Consider nutritional services referral to assist patient with adequate nutrition and appropriate food choices  Outcome: Progressing  Goal: Maintains adequate nutritional intake  Description: INTERVENTIONS:  - Monitor percentage of each meal consumed  - Identify factors contributing to decreased intake, treat as appropriate  - Assist with meals as needed  - Monitor I&O, weight, and lab values if indicated  - Obtain nutrition services referral as needed  Outcome: Progressing  Goal: Oral mucous membranes remain intact  Description: INTERVENTIONS  - Assess oral mucosa and hygiene practices  - Implement preventative oral hygiene regimen  - Implement oral medicated treatments as ordered  - Initiate Nutrition services referral as needed  Outcome: Progressing

## 2022-04-07 NOTE — ASSESSMENT & PLAN NOTE
· Noted bilateral lower extremity circular rash erythematous border with central clearing, covering bilateral shins, lower extremities  Patient states it has been there for a while and noted following being in the woods  He states that it is itchy at times  · Rash appears to be ringworm given the appearance, and spread isolated to lower extremities  · Miconazole cream b i d   To affected area

## 2022-04-07 NOTE — QUICK NOTE
Patient seen in follow up  Resting in chair at time of visit  Denies nausea/vomiting  States he passed a small amount of gas  Has been tolerating sips of clears with meds  Will advance to clear liquids at this time      Zane Peres PA-C

## 2022-04-07 NOTE — ASSESSMENT & PLAN NOTE
Malnutrition Findings:   Adult Malnutrition type: Chronic illness  Adult Degree of Malnutrition: Other severe protein calorie malnutrition  Malnutrition Characteristics: Muscle loss,Fat loss,Inadequate energy  ·     · Reports 15 lb weight loss over the last 1-2 months with poor PO intake  Cachectic appearing  · Consult placed to nutrition for further recommendations  · Currently NPO POD 1- hemicolectomy, continue IVF hydration   · Advanced to clear liquid diet per surgery recommendation          secondary to colonic malignancy  Currently NPO due to small-bowel obstruction  Continue IV fluid hydration      360 Statement: Chronic severe malnutrition related to decreased appetite, N/V, dry heaves, colonic mass as evidenced by < 75% estimated energy intake > 1 mo, Severe muscle loss to pectoralis, deltoid, gastrocnemius and quadriceps muscles + scapula region, severe fat loss to buccal pads  Treated with: Pt currently NPO for possible surgery pending family discussion  Once appropriate for oral diet, would recommend regular diet with ensure enlive TID  Consider appetite stimulant as medically appropriate  Recommend daily weights for nutrition monitoring  BMI Findings: Body mass index is 19 05 kg/m²

## 2022-04-07 NOTE — ASSESSMENT & PLAN NOTE
Lab Results   Component Value Date    CREATININE 1 02 04/07/2022   Creatinine 1 39 on admission  Improved to 1 02 with IV fluid hydration  · Unclear baseline, no prior labs available in care everywhere     · Suspect prerenal secondary to dehyrdration given poor PO intake  · Patient received contrast for CT a/p, continue IVF hydration unti tolerating PO  · Trend BMP  · Avoid nephrotoxins, hypotension

## 2022-04-07 NOTE — PLAN OF CARE
Problem: OCCUPATIONAL THERAPY ADULT  Goal: Performs self-care activities at highest level of function for planned discharge setting  See evaluation for individualized goals  Description: Treatment Interventions: ADL retraining,Functional transfer training,UE strengthening/ROM,Endurance training,Patient/family training,Equipment evaluation/education,Neuromuscular reeducation,Compensatory technique education,Continued evaluation,Energy conservation,Activityengagement          See flowsheet documentation for full assessment, interventions and recommendations  Note: Limitation: Decreased ADL status,Decreased UE strength,Decreased Safe judgement during ADL,Decreased endurance,Decreased self-care trans,Decreased high-level ADLs  Prognosis: Good  Assessment: Pt is a 81 y o  male, admitted to [address] [place] [address] 4/4/2022 d/t experiencing loss of appetite and nausea/dry heaves  Dx: small bowel obstruction  Pt underwent surgical intervention on 4/5/2022 - "LAPAROTOMY EXPLORATORY, RIGHT DWAYNE COLECTOMY WITH ANASTOMOSIS"  Pt with PMHx impacting their performance during ADL tasks, including: no prior medical history at this time  Prior to admission to the hospital Pt was performing ADLs without physical assistance  IADLs without physical assistance  Functional transfers/ambulation with physical assistance  Cognitive status was PTA was intact  OT order placed to assess Pt's ADLs, cognitive status, and performance during functional tasks in order to maximize safety and independence while making most appropriate d/c recommendations   Pt's clinical presentation is currently unstable/unpredictable given new onset deficits that effect Pt's occupational performance and ability to safely return to OF including decrease activity tolerance, decrease standing balance, decrease performance during ADL tasks, decrease safety awareness , increase impulsiveness, decrease UB MS, decrease generalized strength, decrease activity engagement, decrease performance during functional transfers, high fall risk and limited insight to deficits combined with medical complications of hypertension , pain impacting overall mobility status, abnormal renal lab values, change in mental status, abnormal H&H, abnormal CBC, decreased skin integrity, fear/retreat, need for input for mobility technique/safety and significant weight loss  Personal factors affecting Pt at time of initial evaluation include: step(s) to enter environment, multi-level environment, availability as recommended, inability to perform current job functions, inability to perform IADLs, inability to perform ADLs, new need for AD, inability to ambulate household distances, inability to navigate community distances, limited insight into impairments, decreased initiation and engagement and questionable non-compliance  Pt will benefit from continued skilled OT services to address deficits as defined above and to maximize level independence/participation during ADLs and functional tasks to facilitate return toward PLOF and improved quality of life  From an occupational therapy standpoint, recommendation at time of d/c would be [address] with increased family support       OT Discharge Recommendation: Home with home health rehabilitation

## 2022-04-07 NOTE — OCCUPATIONAL THERAPY NOTE
Occupational Therapy Evaluation    Evaluation: 3558-3629  Treatment: 1077-9775    Patient Name: Orion Tyson  Today's Date: 4/7/2022  Problem List  Principal Problem:    Small bowel obstruction (HCC)  Active Problems:    Acute on chronic anemia    JENSEN (acute kidney injury) ([facility] )    Severe protein-calorie malnutrition ([facility] )    Colonic mass    Past Medical History  Past Medical History:   Diagnosis Date    GERD (gastroesophageal reflux disease)      Past Surgical History  Past Surgical History:   Procedure Laterality Date    EXPLORATORY LAPAROTOMY W/ BOWEL RESECTION N/A 4/5/2022    Procedure: LAPAROTOMY EXPLORATORY, RIGHT DWAYNE COLECTOMY WITH ANASTOMOSIS;  Surgeon: Magalys Oliver DO;  Location:  MAIN OR;  Service: General    HERNIA REPAIR             04/07/22 0941   Note Type   Note type Evaluation   Restrictions/Precautions   Other Precautions Bed Alarm; Chair Alarm;Multiple lines; Fall Risk;Pain   Pain Assessment   Pain Assessment Tool FLACC   Pain Location/Orientation Location: Abdomen   Pain Rating: FLACC (Rest) - Face 0   Pain Rating: FLACC (Rest) - Legs 0   Pain Rating: FLACC (Rest) - Activity 0   Pain Rating: FLACC (Rest) - Cry 0   Pain Rating: FLACC (Rest) - Consolability 0   Score: FLACC (Rest) 0   Pain Rating: FLACC (Activity) - Face 1   Pain Rating: FLACC (Activity) - Legs 0   Pain Rating: FLACC (Activity) - Activity 0   Pain Rating: FLACC (Activity) - Cry 1   Pain Rating: FLACC (Activity) - Consolability 1   Score: FLACC (Activity) 3   Home Living   Type of Home House  (1+1 BRITTANY)   Home Layout Two level  (FF L HR to 2nd floor)   Bathroom Shower/Tub   (sponge bathes on 1st floor)   Bathroom Toilet Standard   Bathroom Equipment   (BSC - does not currently use)   Additional Comments Pt reports living in a 2 story home with 1+1 BRITTANY  Pt ambulates with no AD at baseline  Pt reprots sleeping on couch on 1st floor and going to 2nd floor multiple times a day to use the toilet   Pt reports there is another bathroom in baseline for PRN use  Pt reports having a BSC "that was my fathers" but is not currently using   Prior Function   Level of [place] Independent with ADLs and functional mobility   Lives With Son   Receives Help From Family   ADL Assistance Independent   IADLs Independent  (son helps prn)   Falls in the last 6 months 0   Comments Pt reports completing ADLs, light IADLs, and functional ambulation @ I PTA  Pt lives with his son who can assist with IADLs and community mobility PRN   ADL   Where Assessed Chair   Grooming Assistance   (SBA - standing at sinkside)   Grooming Deficit Verbal cueing;Supervision/safety; Increased time to complete   UB Dressing Assistance 5  Supervision/Setup   UB Dressing Deficit Setup   LB Dressing Assistance   ([address] )   [address]; Requires assistive device for steadying;Verbal cueing;Supervision/safety; Increased time to complete; Don/doff R sock; Don/doff L sock; Thread RLE into pants; Thread LLE into pants;Pull up over hips   Toileting Assistance    ([address] )   Toileting Deficit Steadying;Verbal cueing;Supervison/safety; Increased time to complete;Grab bar use;Clothing management up;Clothing management down;Perineal hygiene   Additional Comments Pt completing ADL tasks while seated OOB in recliner chair  UB Dressing @ S after set-up  LB Dressing @ Steadying Assist including donning socsk/pants around feet and CM around waist  Please refer to treatment note for performance during toileting and grooming tasks   Bed Mobility   Supine to Sit 5  Supervision   Additional items Increased time required;Verbal cues   Sit to Supine 5  Supervision   Additional items Increased time required;Verbal cues   Additional Comments HOB flat, no bedrails   vc'ing to utilize UE to assist with transition   Transfers   Sit to Stand   [facility] Assist)   Additional items Increased time required;Verbal cues   Stand to Sit   (SBA)   Additional items Increased time required;Verbal cues   Stand pivot 4  Minimal assistance   Additional items Assist x 1; Increased time required;Verbal cues   Additional Comments Pt completing functional transfers with no AD for UB support  Steadying Assist for STS and Min A for SPT d/t swaying and Pt occasionally crossing feet  Please refer to treatment note for performance during transfers with RW  Balance   Static Sitting Good   Dynamic Sitting Fair +   Static Standing Fair   Dynamic Standing Fair -   Activity Tolerance   Activity Tolerance Patient limited by fatigue   Medical Staff Made Aware Spoke with PT, Yessi Pelletierew   Nurse Made Aware Spoke with RN, Rosendo Rose Assessment   RUE Assessment WFL   LUE Assessment   LUE Assessment WFL   Hand Function   Gross Motor Coordination Functional   Fine Motor Coordination Functional   Sensation   Light Touch No apparent deficits   Cognition   Overall Cognitive Status WFL   Arousal/Participation Alert; Responsive; Cooperative   Attention Attends with cues to redirect   Orientation Level Oriented X4   Memory Within functional limits   Following Commands Follows one step commands without difficulty   Assessment   Limitation Decreased ADL status; Decreased UE strength;Decreased Safe judgement during ADL;Decreased endurance;Decreased self-care trans;Decreased high-level ADLs   Prognosis Good   Assessment Pt is a 81 y o  male, [facility]/2022 d/t experiencing loss of appetite and nausea/dry heaves  Dx: small bowel obstruction  Pt underwent surgical intervention on 4/5/2022 - "LAPAROTOMY EXPLORATORY, RIGHT DWAYNE COLECTOMY WITH ANASTOMOSIS"  Pt with PMHx impacting their performance during ADL tasks, including: no prior medical history at this time  Prior to admission to the hospital Pt was performing ADLs without physical assistance  IADLs without physical assistance  Functional transfers/ambulation with physical assistance  Cognitive status was PTA was intact   OT order placed to assess Pt's ADLs, cognitive status, and performance during functional tasks in order to maximize safety and independence while making most appropriate d/c recommendations  Pt's clinical presentation is currently unstable/unpredictable given new onset deficits that effect Pt's occupational performance and ability to safely return to PLOF including decrease activity tolerance, decrease standing balance, decrease performance during ADL tasks, decrease safety awareness , increase impulsiveness, decrease UB MS, decrease generalized strength, decrease activity engagement, decrease performance during functional transfers, high fall risk and limited insight to deficits combined with medical complications of hypertension , pain impacting overall mobility status, abnormal renal lab values, change in mental status, abnormal H&H, abnormal CBC, decreased skin integrity, fear/retreat, need for input for mobility technique/safety and significant weight loss  Personal factors affecting Pt at time of initial evaluation include: step(s) to enter environment, multi-level environment, availability as recommended, inability to perform current job functions, inability to perform IADLs, inability to perform ADLs, new need for AD, inability to ambulate household distances, inability to navigate community distances, limited insight into impairments, decreased initiation and engagement and questionable non-compliance  Pt will benefit from continued skilled OT services to address deficits as defined above and to maximize level independence/participation during ADLs and functional tasks to facilitate return toward PLOF and improved quality of life  From an occupational therapy standpoint, recommendation at time of d/c would be [address] [place] [address] with increased family support  Plan   Treatment Interventions ADL retraining;Functional transfer training;UE strengthening/ROM; Endurance training;Patient/family training;Equipment evaluation/education; Neuromuscular reeducation; Compensatory technique education;Continued evaluation; Energy conservation; Activityengagement   Goal Expiration Date 04/21/22   OT Treatment Day 1   OT Frequency 3-5x/wk   Additional Treatment Session   Start Time 4973   End Time 1005   Treatment Assessment Pt seen for treatment session #1 this date  Pt alert and agreeable to participate at this time  Pt completing short distance ambulation with use of RW for UB support @ SBA with increased time and vc'ing for Rw management and technique  Pt thenc ompleting toileting tasks standing at toilet with UB supported from grab bar PRN @ Steadying Assist, pt noted to have slight "swaying"  pt completing CM around waist @ Steadying Assist with increased time  Pt then standing at sinkside to complete hand hygiene @ SBA with vc'ing for safety and no LOB noted  Pt then returning to recliner chair with RW @ SBA and vc'ing for technique  Pt seated OOB at end of session with call bell in reach, chair alarm intact and all needs met at [facility] stime  Additional Treatment Day 1   Recommendation   OT Discharge Recommendation Home with home health rehabilitation   AM-PAC Daily Activity Inpatient   Lower Body Dressing 3   Bathing 3   Toileting 3   Upper Body Dressing 3   Grooming 3   Eating 4   Daily Activity Raw Score 19   Daily Activity Standardized Score (Calc for Raw Score >=11) 40 22   AM-PAC Applied Cognition Inpatient   Following a Speech/Presentation 3   Understanding Ordinary Conversation 4   Taking Medications 3   Remembering Where Things Are Placed or Put Away 3   Remembering List of 4-5 Errands 3   Taking Care of Complicated Tasks 3   Applied Cognition Raw Score 19   Applied Cognition Standardized Score 39 77     The patient's raw score on the AM-PAC Daily Activity inpatient short form is 19, standardized score is 40 22, greater than 39 4  Patients at this level are likely to benefit from DC to home   Please refer to the recommendation of the Occupational Therapist for safe DC planning  Pt goals to be met by 4/21/2022    1  Pt will demonstrate ability to complete LB dressing @ Mod I in order to increase safety and independence during meaningful tasks  2  Pt will demonstrate ability to gus/doff socks/shoes while sitting EOB @ Mod I in order to increase safety and independence during meaningful tasks  3  Pt will demonstrate ability to complete toileting tasks including CM and pericare @ Mod I in order to increase safety and independence during meaningful tasks  4  Pt will demonstrate ability to complete EOB, chair, toilet/commode transfers @ Mod I in order to increase performance and participation during functional tasks  5  Pt will demonstrate ability to stand for 10+ minutes while maintaining G balance with use of RW for UB support PRN  6  Pt will demonstrate ability to tolerate 30-35 minute OT session with no vc'ing for deep breathing or use of energy conservation techniques in order to increase activity tolerance during functional tasks  7  Pt will demonstrate Good carryover of use of energy conservation/compensatory strategies during ADLs and functional tasks in order to increase safety and reduce risk for falls  8  Pt will demonstrate Good attention and participation in continued evaluation of functional ambulation house hold distances in order to assist with safe d/c planning  9  Pt will attend to continued cognitive assessments 100% of the time in order to provide most appropriate d/c recommendations  10  Pt will follow 100% simple 2-step commands and be A&O x4 consistently with environmental cues to increase participation in functional activities  11  Pt will identify 3 areas of interest/hobbies and 1 intervention on how to incorporate into daily life in order to increase interaction with environment and peers as well as increase participation in meaningful tasks     12  Pt will demonstrate 100% carryover of BUE HEP in order to increase BUE MS and increase performance during functional tasks upon d/c home      Polo Zamarripa OTR/L

## 2022-04-08 LAB
ANION GAP SERPL CALCULATED.3IONS-SCNC: 6 MMOL/L (ref 4–13)
BUN SERPL-MCNC: 15 MG/DL (ref 5–25)
CALCIUM SERPL-MCNC: 7.5 MG/DL (ref 8.3–10.1)
CHLORIDE SERPL-SCNC: 107 MMOL/L (ref 100–108)
CO2 SERPL-SCNC: 26 MMOL/L (ref 21–32)
CREAT SERPL-MCNC: 0.85 MG/DL (ref 0.6–1.3)
ERYTHROCYTE [DISTWIDTH] IN BLOOD BY AUTOMATED COUNT: 14.9 % (ref 11.6–15.1)
GFR SERPL CREATININE-BSD FRML MDRD: 81 ML/MIN/1.73SQ M
GLUCOSE SERPL-MCNC: 91 MG/DL (ref 65–140)
HCT VFR BLD AUTO: 26.8 % (ref 36.5–49.3)
HEMOCCULT STL QL: NEGATIVE
HEMOCCULT STL QL: NORMAL
HEMOCCULT STL QL: NORMAL
HGB BLD-MCNC: 8.8 G/DL (ref 12–17)
MCH RBC QN AUTO: 29.1 PG (ref 26.8–34.3)
MCHC RBC AUTO-ENTMCNC: 32.8 G/DL (ref 31.4–37.4)
MCV RBC AUTO: 89 FL (ref 82–98)
PLATELET # BLD AUTO: 193 THOUSANDS/UL (ref 149–390)
PMV BLD AUTO: 8.3 FL (ref 8.9–12.7)
POTASSIUM SERPL-SCNC: 3.4 MMOL/L (ref 3.5–5.3)
RBC # BLD AUTO: 3.02 MILLION/UL (ref 3.88–5.62)
SODIUM SERPL-SCNC: 139 MMOL/L (ref 136–145)
WBC # BLD AUTO: 7.39 THOUSAND/UL (ref 4.31–10.16)

## 2022-04-08 PROCEDURE — 97110 THERAPEUTIC EXERCISES: CPT

## 2022-04-08 PROCEDURE — 85027 COMPLETE CBC AUTOMATED: CPT

## 2022-04-08 PROCEDURE — 80048 BASIC METABOLIC PNL TOTAL CA: CPT

## 2022-04-08 PROCEDURE — 99232 SBSQ HOSP IP/OBS MODERATE 35: CPT

## 2022-04-08 PROCEDURE — C9113 INJ PANTOPRAZOLE SODIUM, VIA: HCPCS

## 2022-04-08 PROCEDURE — 97116 GAIT TRAINING THERAPY: CPT

## 2022-04-08 RX ORDER — CALCIUM GLUCONATE 20 MG/ML
1 INJECTION, SOLUTION INTRAVENOUS ONCE
Status: COMPLETED | OUTPATIENT
Start: 2022-04-08 | End: 2022-04-08

## 2022-04-08 RX ORDER — CLOTRIMAZOLE 1 %
CREAM (GRAM) TOPICAL 2 TIMES DAILY
Status: DISCONTINUED | OUTPATIENT
Start: 2022-04-08 | End: 2022-04-11 | Stop reason: HOSPADM

## 2022-04-08 RX ORDER — POTASSIUM CHLORIDE 20 MEQ/1
40 TABLET, EXTENDED RELEASE ORAL ONCE
Status: COMPLETED | OUTPATIENT
Start: 2022-04-08 | End: 2022-04-08

## 2022-04-08 RX ORDER — OXYCODONE HYDROCHLORIDE 5 MG/1
2.5 TABLET ORAL EVERY 6 HOURS PRN
Status: DISCONTINUED | OUTPATIENT
Start: 2022-04-08 | End: 2022-04-10

## 2022-04-08 RX ORDER — ACETAMINOPHEN 325 MG/1
650 TABLET ORAL EVERY 8 HOURS PRN
Status: DISCONTINUED | OUTPATIENT
Start: 2022-04-08 | End: 2022-04-11 | Stop reason: HOSPADM

## 2022-04-08 RX ADMIN — CALCIUM GLUCONATE 1 G: 20 INJECTION, SOLUTION INTRAVENOUS at 10:28

## 2022-04-08 RX ADMIN — POTASSIUM CHLORIDE 40 MEQ: 20 TABLET, EXTENDED RELEASE ORAL at 08:43

## 2022-04-08 RX ADMIN — CYANOCOBALAMIN 1000 MCG: 1000 INJECTION INTRAMUSCULAR; SUBCUTANEOUS at 08:47

## 2022-04-08 RX ADMIN — CALCIUM GLUCONATE 1 G: 20 INJECTION, SOLUTION INTRAVENOUS at 18:03

## 2022-04-08 RX ADMIN — CLOTRIMAZOLE: 10 CREAM TOPICAL at 21:00

## 2022-04-08 RX ADMIN — CLOTRIMAZOLE: 10 CREAM TOPICAL at 14:48

## 2022-04-08 RX ADMIN — HEPARIN SODIUM 5000 UNITS: 5000 INJECTION INTRAVENOUS; SUBCUTANEOUS at 14:47

## 2022-04-08 RX ADMIN — PANTOPRAZOLE SODIUM 40 MG: 40 INJECTION, POWDER, FOR SOLUTION INTRAVENOUS at 08:44

## 2022-04-08 RX ADMIN — MICONAZOLE NITRATE 1 APPLICATION: 2 CREAM TOPICAL at 08:48

## 2022-04-08 RX ADMIN — HEPARIN SODIUM 5000 UNITS: 5000 INJECTION INTRAVENOUS; SUBCUTANEOUS at 21:58

## 2022-04-08 RX ADMIN — HEPARIN SODIUM 5000 UNITS: 5000 INJECTION INTRAVENOUS; SUBCUTANEOUS at 05:04

## 2022-04-08 RX ADMIN — SODIUM CHLORIDE 75 ML/HR: 0.9 INJECTION, SOLUTION INTRAVENOUS at 04:02

## 2022-04-08 NOTE — PROGRESS NOTES
Pt is s/p R hemicolectomy with anastomosis on 4/5, has advanced to clear liquids/toast/crax diet  Pt ate nearly 100% of breakfast, didn't finish andie mist due to not feeling thirsty after drinking other liquids on tray, did not report intolerance  Pt is agreeable to ensure clear supplementation, will order BID  Consider ensure enlive once diet advances/compliant with diet  Advance diet as medically appropriate gradually to surgical soft

## 2022-04-08 NOTE — PLAN OF CARE
Problem: MOBILITY - ADULT  Goal: Maintain or return to baseline ADL function  Description: INTERVENTIONS:  -  Assess patient's ability to carry out ADLs; assess patient's baseline for ADL function and identify physical deficits which impact ability to perform ADLs (bathing, care of mouth/teeth, toileting, grooming, dressing, etc )  - Assess/evaluate cause of self-care deficits   - Assess range of motion  - Assess patient's mobility; develop plan if impaired  - Assess patient's need for assistive devices and provide as appropriate  - Encourage maximum independence but intervene and supervise when necessary  - Involve family in performance of ADLs  - Assess for home care needs following discharge   - Consider OT consult to assist with ADL evaluation and planning for discharge  - Provide patient education as appropriate  Outcome: Progressing  Goal: Maintains/Returns to pre admission functional level  Description: INTERVENTIONS:  - Perform BMAT or MOVE assessment daily    - Set and communicate daily mobility goal to care team and patient/family/caregiver  - Collaborate with rehabilitation services on mobility goals if consulted  - Perform Range of Motion 3 times a day  - Reposition patient every 2 hours  - Out of bed for meals 3 times a day  - Out of bed for toileting  - Record patient progress and toleration of activity level   Outcome: Progressing     Problem: Nutrition/Hydration-ADULT  Goal: Nutrient/Hydration intake appropriate for improving, restoring or maintaining nutritional needs  Description: Monitor and assess patient's nutrition/hydration status for malnutrition  Collaborate with interdisciplinary team and initiate plan and interventions as ordered  Monitor patient's weight and dietary intake as ordered or per policy  Utilize nutrition screening tool and intervene as necessary  Determine patient's food preferences and provide high-protein, high-caloric foods as appropriate       INTERVENTIONS:  - Monitor oral intake, urinary output, labs, and treatment plans  - Assess nutrition and hydration status and recommend course of action  - Evaluate amount of meals eaten  - Assist patient with eating if necessary   - Allow adequate time for meals  - Recommend/ encourage appropriate diets, oral nutritional supplements, and vitamin/mineral supplements  - Order, calculate, and assess calorie counts as needed  - Recommend, monitor, and adjust tube feedings and TPN/PPN based on assessed needs  - Assess need for intravenous fluids  - Provide specific nutrition/hydration education as appropriate  - Include patient/family/caregiver in decisions related to nutrition  Outcome: Progressing     Problem: GASTROINTESTINAL - ADULT  Goal: Minimal or absence of nausea and/or vomiting  Description: INTERVENTIONS:  - Administer IV fluids if ordered to ensure adequate hydration  - Maintain NPO status until nausea and vomiting are resolved  - Nasogastric tube if ordered  - Administer ordered antiemetic medications as needed  - Provide nonpharmacologic comfort measures as appropriate  - Advance diet as tolerated, if ordered  - Consider nutrition services referral to assist patient with adequate nutrition and appropriate food choices  Outcome: Progressing  Goal: Maintains or returns to baseline bowel function  Description: INTERVENTIONS:  - Assess bowel function  - Encourage oral fluids to ensure adequate hydration  - Administer IV fluids if ordered to ensure adequate hydration  - Administer ordered medications as needed  - Encourage mobilization and activity  - Consider nutritional services referral to assist patient with adequate nutrition and appropriate food choices  Outcome: Progressing  Goal: Maintains adequate nutritional intake  Description: INTERVENTIONS:  - Monitor percentage of each meal consumed  - Identify factors contributing to decreased intake, treat as appropriate  - Assist with meals as needed  - Monitor I&O, weight, and lab values if indicated  - Obtain nutrition services referral as needed  Outcome: Progressing  Goal: Oral mucous membranes remain intact  Description: INTERVENTIONS  - Assess oral mucosa and hygiene practices  - Implement preventative oral hygiene regimen  - Implement oral medicated treatments as ordered  - Initiate Nutrition services referral as needed  Outcome: Progressing

## 2022-04-08 NOTE — CASE MANAGEMENT
Case Management Progress Note    Patient name Francis Valle   Location /-01 MRN [id number]  : 1940 Date 2022       LOS (days): 4  Geometric Mean LOS (GMLOS) (days): 10 30  Days to GMLOS:6 6        OBJECTIVE:        Current admission status: Inpatient  Preferred Pharmacy:   [address], [address]  [address] [facility] [place] [address]  Phone: [phone] Fax: [phone]    Primary Care Provider: No primary care provider on file  Primary Insurance: MEDICARE  Secondary Insurance:     PROGRESS NOTE:    HHC has been recommended for pt at time of discharge  CM discussing [address] options with pt and pts daughter, both are agreeable to [address], do not have preference to any [address] agencies  Cm placing referrals to [facility] and [facility] in [facility]  Awaiting reply    A post acute care recommendation was made by your care team for [address]  Discussed Freedom of Choice with both patient and caregiver  List of agencies given to both patient and caregiver via in person  both patient and caregiver aware the list is custom filtered for them by zip code location and that [facility] post acute providers are designated  Of note, pt sts he "may" be staying with his daughter at time of dc, daughter's address is:  [address] [place] [address]/ [place] [address]      A rolling walker has also been recommended for pt at time of dc  PT is agreeable  Pt does not have a preference to a DME company  CM placing an order for walker in [place]  [place] of choice was provided in regards to needing a home medical equipment company, pt does not have preference  Pt is aware that we frequently utilized [employer] as we have a working relationship with this company  Patients choice is to use [employer]      [facility] will accept pt at time of dc    Pt with no current PCP, Cm offering to made a PCP appointment for pt to establish primary care, pt is agreeable and does not have a preference to any PCP      CM made PCP appointment for pt with [facility] in [facility] [address] on 4/12/21 at 1005

## 2022-04-08 NOTE — PROGRESS NOTES
Progress Note - General Surgery   Jaime Ballesteros Sr  81 y o  male MRN: [id number]  Unit/Bed#: -01 Encounter: [id number]    Assessment:  POD3 s/p exploratory laparotomy and right hemicolectomy with anastomosis  Tolerating clear liquids  Passing flatus, no BM  Afebrile, VSS  WBC 7 39 (9 49, 10 59)  Hgb stable, 8 8 (9 8, 8 5, 10 3)  CEA 3 5  Surgical pathology pending  Making adequate urine      Plan:  Clear liquids, add small bites of toast and crackers today  IVF hydration  Monitor I/Os  Medicate PRN pain/nausea, minimize opioids  Follow qAM CBC and BMP  Follow surgical pathology  OOB today with PT/OT  Heparin anticoagulation  SCDs  Mepilex dressing to stay in place today  May shower, Mepilex dressing is water resistent  Management of comorbidities per primary team      Subjective/Objective     Subjective: Doing well  States that he is tolerating clear liquids without n/v  He is now passing larger amounts of flatus  No regurgitation symptoms  Abdominal pain improving as well  Objective:   Blood pressure 143/80, pulse 93, temperature 98 5 °F (36 9 °C), temperature source Oral, resp  rate 17, height 5' 6" (1 676 m), weight 53 5 kg (118 lb), SpO2 99 %  ,Body mass index is 19 05 kg/m²  Intake/Output Summary (Last 24 hours) at 4/8/2022 0724  Last data filed at 4/8/2022 0408  Gross per 24 hour   Intake 2156 25 ml   Output 2650 ml   Net -493 75 ml       Invasive Devices  Report    Peripheral Intravenous Line            Peripheral IV 04/05/22 Left Hand 2 days                Physical Exam:   Gen: AxOx3, no distress noted, resting comfortably in bed  Heart: RRR  Lungs: CTA  Abd: soft, there is appropriate incisional tenderness, non distended, bowel sounds are present, Mepilex dressing in place with a small area of strike through noted at midline of wound unchanged in size since yesterday  Lab, Imaging and other studies:  I have personally reviewed pertinent lab results      CBC:   Lab Results   Component Value Date    WBC 7 39 04/08/2022    HGB 8 8 (L) 04/08/2022    HCT 26 8 (L) 04/08/2022    MCV 89 04/08/2022     04/08/2022    MCH 29 1 04/08/2022    MCHC 32 8 04/08/2022    RDW 14 9 04/08/2022    MPV 8 3 (L) 04/08/2022     CMP:   Lab Results   Component Value Date    SODIUM 139 04/08/2022    K 3 4 (L) 04/08/2022     04/08/2022    CO2 26 04/08/2022    BUN 15 04/08/2022    CREATININE 0 85 04/08/2022    CALCIUM 7 5 (L) 04/08/2022    EGFR 81 04/08/2022     VTE Pharmacologic Prophylaxis: Heparin  VTE Mechanical Prophylaxis: sequential compression device       Jay Guan PA-C

## 2022-04-08 NOTE — PLAN OF CARE
Problem: PHYSICAL THERAPY ADULT  Goal: Performs mobility at highest level of function for planned discharge setting  See evaluation for individualized goals  Description: Treatment/Interventions: Functional transfer training,LE strengthening/ROM,Elevations,Therapeutic exercise,Endurance training,Patient/family training,Equipment eval/education,Bed mobility,Gait training,Compensatory technique education,Spoke to nursing,Spoke to case management,OT  Equipment Recommended: [place]       See flowsheet documentation for full assessment, interventions and recommendations  Outcome: Progressing  Note: Prognosis: Good  Problem List: Decreased strength,Decreased endurance,Impaired balance,Decreased mobility,Impaired judgement,Decreased safety awareness  Assessment: Pt seen for PT treatment session this date with interventions consisting of gait training w/ emphasis on improving pt's ability to ambulate level surfaces x 15' + 175' + 225' with SBA provided by therapist with RW, Therapeutic exercise consisting of: AROM 10 reps B LE in standing with B/L UE support position and navigating 12 stairs w/ R handrail with alternating pattern with SBA  Pt agreeable to PT treatment session upon arrival, pt found seated OOB in recliner, in no apparent distress  In comparison to previous session, pt with improvements in activity tolerance, ambulatory balance and level of assistance with pt ambulating increased distance, completing ther ex assigned, and negotiating stairs  Post session: pt returned back to recliner, chair alarm engaged, all needs in reach and RN notified of session findings/recommendations Continue to recommend Home PT at time of d/c in order to maximize pt's functional independence and safety w/ mobility  Pt continues to be functioning below baseline level, and remains limited 2* factors listed above and including decreased strength, balance, mobility, and safety awareness   PT will continue to see pt while here in order to address the deficits listed above and provide interventions consistent w/ POC in effort to achieve STGs  Barriers to Discharge: Inaccessible home environment,Decreased caregiver support  Barriers to Discharge Comments: pt has assistance from son PRN, no FF bathroom access     PT Discharge Recommendation: Home with home health rehabilitation          See flowsheet documentation for full assessment

## 2022-04-08 NOTE — PLAN OF CARE
Problem: MOBILITY - ADULT  Goal: Maintain or return to baseline ADL function  Description: INTERVENTIONS:  -  Assess patient's ability to carry out ADLs; assess patient's baseline for ADL function and identify physical deficits which impact ability to perform ADLs (bathing, care of mouth/teeth, toileting, grooming, dressing, etc )  - Assess/evaluate cause of self-care deficits   - Assess range of motion  - Assess patient's mobility; develop plan if impaired  - Assess patient's need for assistive devices and provide as appropriate  - Encourage maximum independence but intervene and supervise when necessary  - Involve family in performance of ADLs  - Assess for home care needs following discharge   - Consider OT consult to assist with ADL evaluation and planning for discharge  - Provide patient education as appropriate  Outcome: Progressing  Goal: Maintains/Returns to pre admission functional level  Description: INTERVENTIONS:  - Perform BMAT or MOVE assessment daily    - Set and communicate daily mobility goal to care team and patient/family/caregiver  - Collaborate with rehabilitation services on mobility goals if consulted  - Perform Range of Motion   times a day  - Reposition patient every   hours  - Dangle patient   times a day  - Stand patient   times a day  - Ambulate patient   times a day  - Out of bed to chair   times a day   - Out of bed for meals    times a day  - Out of bed for toileting  - Record patient progress and toleration of activity level   Outcome: Progressing     Problem: Potential for Falls  Goal: Patient will remain free of falls  Description: INTERVENTIONS:  - Educate patient/family on patient safety including physical limitations  - Instruct patient to call for assistance with activity   - Consult OT/PT to assist with strengthening/mobility   - Keep Call bell within reach  - Keep bed low and locked with side rails adjusted as appropriate  - Keep care items and personal belongings within reach  - Initiate and maintain comfort rounds  - Make Fall Risk Sign visible to staff  - Offer Toileting every   Hours, in advance of need  - Initiate/Maintain   alarm  - Obtain necessary fall risk management equipment:   - Apply yellow socks and bracelet for high fall risk patients  - Consider moving patient to room near nurses station  Outcome: Progressing     Problem: Prexisting or High Potential for Compromised Skin Integrity  Goal: Skin integrity is maintained or improved  Description: INTERVENTIONS:  - Identify patients at risk for skin breakdown  - Assess and monitor skin integrity  - Assess and monitor nutrition and hydration status  - Monitor labs   - Assess for incontinence   - Turn and reposition patient  - Assist with mobility/ambulation  - Relieve pressure over bony prominences  - Avoid friction and shearing  - Provide appropriate hygiene as needed including keeping skin clean and dry  - Evaluate need for skin moisturizer/barrier cream  - Collaborate with interdisciplinary team   - Patient/family teaching  - Consider wound care consult   Outcome: Progressing     Problem: Nutrition/Hydration-ADULT  Goal: Nutrient/Hydration intake appropriate for improving, restoring or maintaining nutritional needs  Description: Monitor and assess patient's nutrition/hydration status for malnutrition  Collaborate with interdisciplinary team and initiate plan and interventions as ordered  Monitor patient's weight and dietary intake as ordered or per policy  Utilize nutrition screening tool and intervene as necessary  Determine patient's food preferences and provide high-protein, high-caloric foods as appropriate       INTERVENTIONS:  - Monitor oral intake, urinary output, labs, and treatment plans  - Assess nutrition and hydration status and recommend course of action  - Evaluate amount of meals eaten  - Assist patient with eating if necessary   - Allow adequate time for meals  - Recommend/ encourage appropriate diets, oral nutritional supplements, and vitamin/mineral supplements  - Order, calculate, and assess calorie counts as needed  - Recommend, monitor, and adjust tube feedings and TPN/PPN based on assessed needs  - Assess need for intravenous fluids  - Provide specific nutrition/hydration education as appropriate  - Include patient/family/caregiver in decisions related to nutrition  Outcome: Progressing     Problem: PAIN - ADULT  Goal: Verbalizes/displays adequate comfort level or baseline comfort level  Description: Interventions:  - Encourage patient to monitor pain and request assistance  - Assess pain using appropriate pain scale  - Administer analgesics based on type and severity of pain and evaluate response  - Implement non-pharmacological measures as appropriate and evaluate response  - Consider cultural and social influences on pain and pain management  - Notify physician/advanced practitioner if interventions unsuccessful or patient reports new pain  Outcome: Progressing     Problem: GASTROINTESTINAL - ADULT  Goal: Minimal or absence of nausea and/or vomiting  Description: INTERVENTIONS:  - Administer IV fluids if ordered to ensure adequate hydration  - Maintain NPO status until nausea and vomiting are resolved  - Nasogastric tube if ordered  - Administer ordered antiemetic medications as needed  - Provide nonpharmacologic comfort measures as appropriate  - Advance diet as tolerated, if ordered  - Consider nutrition services referral to assist patient with adequate nutrition and appropriate food choices  Outcome: Progressing  Goal: Maintains or returns to baseline bowel function  Description: INTERVENTIONS:  - Assess bowel function  - Encourage oral fluids to ensure adequate hydration  - Administer IV fluids if ordered to ensure adequate hydration  - Administer ordered medications as needed  - Encourage mobilization and activity  - Consider nutritional services referral to assist patient with adequate nutrition and appropriate food choices  Outcome: Progressing  Goal: Maintains adequate nutritional intake  Description: INTERVENTIONS:  - Monitor percentage of each meal consumed  - Identify factors contributing to decreased intake, treat as appropriate  - Assist with meals as needed  - Monitor I&O, weight, and lab values if indicated  - Obtain nutrition services referral as needed  Outcome: Progressing  Goal: Oral mucous membranes remain intact  Description: INTERVENTIONS  - Assess oral mucosa and hygiene practices  - Implement preventative oral hygiene regimen  - Implement oral medicated treatments as ordered  - Initiate Nutrition services referral as needed  Outcome: Progressing

## 2022-04-08 NOTE — PROGRESS NOTES
[address]  Progress Note Guillermo Avalos   1940, 81 y o  male MRN: [id number]  Unit/Bed#: -01 Encounter: [id number]  Primary Care Provider: No primary care provider on file  Date and time admitted to hospital: 4/4/2022  7:28 PM    * Small bowel obstruction [facility])  Assessment & Plan  · Presents from home with nausea, dry heaves,hiccups, loss of appetite and 15 lb weight loss over the last month  Reports father with history of colon cancer diagnosed at age 81  · No prior colonoscopies or cancer history per patient and family  Denies medical/surgical history  Has not seen doctors or followed up with lab testing or screening test in years  · CT a/p w contrast: "Colonic mass involving the hepatic flexure where there is marked circumferential wall thickening and significant luminal narrowing with multiple fluid-filled dilated loops of since distal small bowel, and fluid distended proximal colon compatible with obstruction  No pneumatosis or free air  No definite metastatic disease  Haziness involving the omentum in the right upper quadrant adjacent to the mass which is nonspecific but is concerning for localized carcinomatosis "  · CT chest negative for malignancy  · POD3: hemicolectomy w/anatamosis  · Advance diet to clears per surgery recommendation  · continue IVF hydration  · Appreciate general surgery recommendations  · Discussed with patient at bedside and with daughter over the phone  Currently patient is agreeable for surgical intervention  Patient is medically cleared for surgery  Tinea corporis  Assessment & Plan  · Noted bilateral lower extremity circular rash erythematous border with central clearing, covering bilateral shins, lower extremities  Patient states it has been there for a while and noted following [place] the woods  He states that it is itchy at times    · Rash appears to be ringworm given the appearance, and spread isolated to lower extremities  · Miconazole cream b i d  To affected area- changed to clotrimazole 1% BID [id number]    Colonic mass  Assessment & Plan  · Obstructing mass distal to hepatic flexure  · POD#1 S/p ex lap, right hemicolectomy with anastomosis  · Surgical pathology pending    Severe protein-calorie malnutrition ([facility] [address] )  Assessment & Plan  Malnutrition Findings:   Adult Malnutrition type: Chronic illness  Adult Degree of Malnutrition: Other severe protein calorie malnutrition  Malnutrition Characteristics: Muscle loss,Fat loss,Inadequate energy  ·     · Reports 15 lb weight loss over the last 1-2 months with poor PO intake  Cachectic appearing  · Consult placed to nutrition for further recommendations  · Clear liquid diet, bites of toast/crackers- IV fluids d/c              secondary to colonic malignancy  Currently NPO due to small-bowel obstruction  Continue IV fluid hydration      360 Statement: Chronic severe malnutrition related to decreased appetite, N/V, dry heaves, colonic mass as evidenced by < 75% estimated energy intake > 1 mo, Severe muscle loss to pectoralis, deltoid, gastrocnemius and quadriceps muscles + scapula region, severe fat loss to buccal pads  Treated with: Pt currently NPO for possible surgery pending family discussion  Once appropriate for oral diet, would recommend regular diet with ensure enlive TID  Consider appetite stimulant as medically appropriate  Recommend daily weights for nutrition monitoring  BMI Findings: Body mass index is 19 05 kg/m²  JENSEN (acute kidney injury) [facility])  Assessment & Plan  Lab Results   Component Value Date    CREATININE 0 85 04/08/2022   Creatinine 1 39 on admission  Improved to 1 02 with IV fluid hydration  · Unclear baseline, no prior labs available in care everywhere     · Suspect prerenal secondary to dehyrdration given poor PO intake  · Patient received contrast for CT a/p, d/c IV fluids w/advanced diet  · Trend BMP  · Avoid nephrotoxins, hypotension       Acute on chronic anemia  Assessment & Plan  · Hgb 11 2 on admission   · No baseline labs available  Hemoglobin dropped to 10 3 with hydration  Also due to underlying colonic malignancy  · Denies melena, hematochezia  · normal folate, low B12 and iron panel, occult stool pending  · Continue to trend CBC        VTE Pharmacologic Prophylaxis: VTE Score: 3 Moderate Risk (Score 3-4) - Pharmacological DVT Prophylaxis Ordered: heparin  Patient Centered Rounds: I performed bedside rounds with nursing staff today  Discussions with Specialists or Other Care Team Provider: General Surgery    Education and Discussions with Family / Patient: Attempted to update  (son and daughter) via phone  Unable to contact  Time Spent for Care: 30 minutes  More than 50% of total time spent on counseling and coordination of care as described above  Current Length of Stay: 4 day(s)  Current Patient Status: Inpatient   Certification Statement: The patient will continue to require additional inpatient hospital stay due to advancing diet s/p hemicolectomy  Discharge Plan: Anticipate discharge in 48-72 hrs to home with home services  Code Status: Level 1 - Full Code    Subjective:   Patient of bed feeling well today, tolerating clears  Patient states he is not passing gas  Pain control with p r n  Medications and which improved from yesterday  Is voiding well Moreno catheter removal   Denies abdominal pain, nausea, distension  Objective:     Vitals:   Temp (24hrs), Av 4 °F (36 9 °C), Min:97 4 °F (36 3 °C), Max:98 8 °F (37 1 °C)    Temp:  [97 4 °F (36 3 °C)-98 8 °F (37 1 °C)] 98 7 °F (37 1 °C)  HR:  [62-93] 85  Resp:  [17-19] 19  BP: (118-143)/(65-80) 139/76  SpO2:  [96 %-100 %] 100 %  Body mass index is 19 05 kg/m²  Input and Output Summary (last 24 hours):      Intake/Output Summary (Last 24 hours) at 2022 1220  Last data filed at 2022 0833  Gross per 24 hour   Intake 2975 ml Output 2100 ml   Net 875 ml       Physical Exam:   Physical Exam  Vitals and nursing note reviewed  Constitutional:       Appearance: He is well-developed  HENT:      Head: Normocephalic and atraumatic  Mouth/Throat:      Mouth: Mucous membranes are moist       Pharynx: Oropharynx is clear  Eyes:      General: No scleral icterus  Extraocular Movements: Extraocular movements intact  Conjunctiva/sclera: Conjunctivae normal       Pupils: Pupils are equal, round, and reactive to light  Cardiovascular:      Rate and Rhythm: Normal rate and regular rhythm  Heart sounds: No murmur heard  Pulmonary:      Effort: Pulmonary effort is normal  No respiratory distress  Breath sounds: Normal breath sounds  No wheezing or rales  Abdominal:      General: There is no distension  Palpations: Abdomen is soft  Tenderness: There is abdominal tenderness (Incisional)  There is no guarding  Musculoskeletal:      Cervical back: Neck supple  Right lower leg: No edema  Left lower leg: No edema  Skin:     General: Skin is warm and dry  Capillary Refill: Capillary refill takes less than 2 seconds  Findings: Rash ( bilateral lower extremity below-knee-ringworm) present  Neurological:      General: No focal deficit present  Mental Status: He is alert  Motor: No weakness  Additional Data:     Labs:  Results from last 7 days   Lab Units 04/08/22  0500 04/07/22  0553 04/07/22  0553   WBC Thousand/uL 7 39   < > 9 49   HEMOGLOBIN g/dL 8 8*   < > 9 8*   HEMATOCRIT % 26 8*   < > 30 2*   PLATELETS Thousands/uL 193   < > 226   NEUTROS PCT %  --   --  79*   LYMPHS PCT %  --   --  9*   MONOS PCT %  --   --  11   EOS PCT %  --   --  0    < > = values in this interval not displayed       Results from last 7 days   Lab Units 04/08/22  0500 04/06/22  0509 04/05/22  0517   SODIUM mmol/L 139   < > 137   POTASSIUM mmol/L 3 4*   < > 3 3*   CHLORIDE mmol/L 107   < > 103 CO2 mmol/L 26   < > 27   BUN mg/dL 15   < > 17   CREATININE mg/dL 0 85   < > 1 02   ANION GAP mmol/L 6   < > 7   CALCIUM mg/dL 7 5*   < > 7 8*   ALBUMIN g/dL  --   --  2 9*   TOTAL BILIRUBIN mg/dL  --   --  0 34   ALK PHOS U/L  --   --  105   ALT U/L  --   --  19   AST U/L  --   --  21   GLUCOSE RANDOM mg/dL 91   < > 101    < > = values in this interval not displayed  Lines/Drains:  Invasive Devices  Report    Peripheral Intravenous Line            Peripheral IV 04/05/22 Left Hand 2 days                      Imaging: Reviewed radiology reports from this admission including: abdominal/pelvic CT    Recent Cultures (last 7 days):         Last 24 Hours Medication List:   Current Facility-Administered Medications   Medication Dose Route Frequency Provider Last Rate    acetaminophen  650 mg Oral Q8H PRN Kitfranky Casillas PA-C      clotrimazole   Topical BID FILI Hansen      cyanocobalamin  1,000 mcg Subcutaneous Daily [facility]      heparin (porcine)  5,000 Units Subcutaneous [place], [place]      HYDROmorphone  0 2 mg Intravenous Q4H PRN FILI Hansen      ondansetron  4 mg Intravenous Q6H PRN Opal Smith DO      oxyCODONE  2 5 mg Oral Q6H PRN Kit Casillas PA-C      pantoprazole  40 mg Intravenous Q24H [facility] FILI Hansen          Today, Patient Was Seen By: FILI Hansen    **Please Note: This note may have been constructed using a voice recognition system  **

## 2022-04-08 NOTE — ASSESSMENT & PLAN NOTE
Malnutrition Findings:   Adult Malnutrition type: Chronic illness  Adult Degree of Malnutrition: Other severe protein calorie malnutrition  Malnutrition Characteristics: Muscle loss,Fat loss,Inadequate energy  ·     · Reports 15 lb weight loss over the last 1-2 months with poor PO intake  Cachectic appearing  · Consult placed to nutrition for further recommendations  · Clear liquid diet, bites of toast/crackers- IV fluids d/c              secondary to colonic malignancy  Currently NPO due to small-bowel obstruction  Continue IV fluid hydration      360 Statement: Chronic severe malnutrition related to decreased appetite, N/V, dry heaves, colonic mass as evidenced by < 75% estimated energy intake > 1 mo, Severe muscle loss to pectoralis, deltoid, gastrocnemius and quadriceps muscles + scapula region, severe fat loss to buccal pads  Treated with: Pt currently NPO for possible surgery pending family discussion  Once appropriate for oral diet, would recommend regular diet with ensure enlive TID  Consider appetite stimulant as medically appropriate  Recommend daily weights for nutrition monitoring  BMI Findings: Body mass index is 19 05 kg/m²

## 2022-04-08 NOTE — ASSESSMENT & PLAN NOTE
· Presents from home with nausea, dry heaves,hiccups, loss of appetite and 15 lb weight loss over the last month  Reports father with history of colon cancer diagnosed at age 81  · No prior colonoscopies or cancer history per patient and family  Denies medical/surgical history  Has not seen doctors or followed up with lab testing or screening test in years  · CT a/p w contrast: "Colonic mass involving the hepatic flexure where there is marked circumferential wall thickening and significant luminal narrowing with multiple fluid-filled dilated loops of since distal small bowel, and fluid distended proximal colon compatible with obstruction  No pneumatosis or free air  No definite metastatic disease  Haziness involving the omentum in the right upper quadrant adjacent to the mass which is nonspecific but is concerning for localized carcinomatosis "  · CT chest negative for malignancy  · POD3: hemicolectomy w/anatamosis  · Advance diet to clears per surgery recommendation  · continue IVF hydration  · Appreciate general surgery recommendations  · Discussed with patient at bedside and with daughter over the phone  Currently patient is agreeable for surgical intervention  Patient is medically cleared for surgery

## 2022-04-08 NOTE — PHYSICAL THERAPY NOTE
PHYSICAL THERAPY TREATMENT NOTE  NAME:  En Swanson   DATE: 04/08/22    Length Of Stay: 4  Performed at least 2 patient identifiers during session: Name and Birthday  Treatment time: 1643-3044  Treatment length: 38 min       04/08/22 1251   Note Type   Note Type Treatment   Pain Assessment   Pain Assessment Tool 0-10   Pain Score No Pain   Restrictions/Precautions   Other Precautions Chair Alarm; Bed Alarm; Fall Risk   General   Chart Reviewed Yes   Response to Previous Treatment Patient with no complaints from previous session  Family/Caregiver Present Yes  (pt's dtr there for end of session)   Cognition   Overall Cognitive Status WFL   Orientation Level Oriented X4   Following Commands Follows one step commands without difficulty   Bed Mobility   Additional Comments Bed mobility not assessed this session as pt seated OOB at start and end   Transfers   Sit to Stand   (SBA)   Additional items Increased time required;Verbal cues   Stand to Sit   (SBA)   Additional items Increased time required;Verbal cues   Stand pivot   (SBA)   Additional items Increased time required;Verbal cues   Additional Comments Pt completed all transfers with RW, incidental VC for safety and hand placement   Ambulation/Elevation   Gait pattern Narrow NESHA;Scissoring;Decreased foot clearance; Short stride; Excessively slow   Gait Assistance   (SBA)   Additional items Verbal cues   Assistive Device Rolling walker   Distance 15' + 175' + 225' with SBA, VC for increased foot clearance  HR and O2 WNL throughout session  Pt with fair + static standing balance x 1 min without UE support to use restroom   Stair Management Assistance   (SBA)   Additional items Verbal cues   Stair Management Technique One rail R;Alternating pattern; Foreward;Reciprocal   Number of Stairs 12   Balance   Static Sitting Normal   Dynamic Sitting Good   Static Standing Fair +   Dynamic Standing Fair   Ambulatory Fair -   Endurance Deficit   Endurance Deficit No Activity Tolerance   Activity Tolerance Patient tolerated treatment well   Nurse Made Aware Amanda TIPTON   Exercises   Hip Flexion Standing;10 reps;AROM; Bilateral   Hip Abduction Standing;10 reps;AROM; Bilateral   Hip Extension Standing;10 reps;AROM; Bilateral   Ankle Pumps Standing;10 reps;AROM; Bilateral   Balance training  standing hamstring curls x 10 reps, B/L, standing toe raises x 10 B/L   Assessment   Prognosis Good   Problem List Decreased strength;Decreased endurance; Impaired balance;Decreased mobility; Impaired judgement;Decreased safety awareness   Barriers to Discharge Inaccessible home environment;Decreased caregiver support   Barriers to Discharge Comments pt has assistance from vidal PRNjosy FF bathroom access   Goals   PT Treatment Day 2   Plan   Treatment/Interventions Functional transfer training;LE strengthening/ROM; Elevations; Therapeutic exercise; Endurance training;Patient/family training;Equipment eval/education; Compensatory technique education; Bed mobility;Gait training;Spoke to nursing   Progress Progressing toward goals   PT Frequency 2-3x/wk   Recommendation   PT Discharge Recommendation Home with home health rehabilitation   Equipment Recommended [place] walker   Change/add to [employer]? No   AM-PAC Basic Mobility Inpatient   Turning in Bed Without Bedrails 4   Lying on Back to Sitting on Edge of Flat Bed 4   Moving Bed to Chair 3   Standing Up From Chair 3   Walk in Room 3   Climb 3-5 Stairs 3   Basic Mobility Inpatient Raw Score 20   Basic Mobility Standardized Score 43 99   Highest Level Of Mobility   JH-HLM Goal 6: Walk 10 steps or more   JH-HLM Highest Level of Mobility 8: Walk 250 feet ot more   JH-HLM Goal Achieved Yes   Education   Education Provided Mobility training   End of Consult   Patient Position at End of Consult Bedside chair;Bed/Chair alarm activated; All needs within reach     The patient's AM-PAC Basic Mobility Inpatient Short Form Raw Score is 20  A Raw score of greater than 16 suggests the patient may benefit from discharge to home  Please also refer to the recommendation of the Physical Therapist for safe discharge planning  Assessment: Pt seen for PT treatment session this date with interventions consisting of gait training w/ emphasis on improving pt's ability to ambulate level surfaces x 15' + 175' + 225' with SBA provided by therapist with RW, Therapeutic exercise consisting of: AROM 10 reps B LE in standing with B/L UE support position and navigating 12 stairs w/ R handrail with alternating pattern with SBA  Pt agreeable to PT treatment session upon arrival, pt found seated OOB in recliner, in no apparent distress  In comparison to previous session, pt with improvements in activity tolerance, ambulatory balance and level of assistance with pt ambulating increased distance, completing ther ex assigned, and negotiating stairs  Post session: pt returned back to recliner, chair alarm engaged, all needs in reach and RN notified of session findings/recommendations Continue to recommend Home PT at time of d/c in order to maximize pt's functional independence and safety w/ mobility  Pt continues to be functioning below baseline level, and remains limited 2* factors listed above and including decreased strength, balance, mobility, and safety awareness  PT will continue to see pt while here in order to address the deficits listed above and provide interventions consistent w/ POC in effort to achieve STGs       Nani Troncoso, PT,DPT

## 2022-04-08 NOTE — ASSESSMENT & PLAN NOTE
· Noted bilateral lower extremity circular rash erythematous border with central clearing, covering bilateral shins, lower extremities  Patient states it has been there for a while and noted following being in the woods  He states that it is itchy at times  · Rash appears to be ringworm given the appearance, and spread isolated to lower extremities  · Miconazole cream b i d   To affected area- changed to clotrimazole 1% BID [id number]

## 2022-04-09 LAB
ANION GAP SERPL CALCULATED.3IONS-SCNC: 5 MMOL/L (ref 4–13)
BUN SERPL-MCNC: 13 MG/DL (ref 5–25)
CA-I BLD-SCNC: 1.08 MMOL/L (ref 1.12–1.32)
CALCIUM SERPL-MCNC: 7.8 MG/DL (ref 8.3–10.1)
CHLORIDE SERPL-SCNC: 107 MMOL/L (ref 100–108)
CO2 SERPL-SCNC: 27 MMOL/L (ref 21–32)
CREAT SERPL-MCNC: 0.88 MG/DL (ref 0.6–1.3)
ERYTHROCYTE [DISTWIDTH] IN BLOOD BY AUTOMATED COUNT: 14.6 % (ref 11.6–15.1)
GFR SERPL CREATININE-BSD FRML MDRD: 80 ML/MIN/1.73SQ M
GLUCOSE SERPL-MCNC: 93 MG/DL (ref 65–140)
HCT VFR BLD AUTO: 27 % (ref 36.5–49.3)
HGB BLD-MCNC: 8.8 G/DL (ref 12–17)
MCH RBC QN AUTO: 28.5 PG (ref 26.8–34.3)
MCHC RBC AUTO-ENTMCNC: 32.6 G/DL (ref 31.4–37.4)
MCV RBC AUTO: 87 FL (ref 82–98)
PLATELET # BLD AUTO: 205 THOUSANDS/UL (ref 149–390)
PMV BLD AUTO: 8.4 FL (ref 8.9–12.7)
POTASSIUM SERPL-SCNC: 3.3 MMOL/L (ref 3.5–5.3)
RBC # BLD AUTO: 3.09 MILLION/UL (ref 3.88–5.62)
SODIUM SERPL-SCNC: 139 MMOL/L (ref 136–145)
WBC # BLD AUTO: 6.1 THOUSAND/UL (ref 4.31–10.16)

## 2022-04-09 PROCEDURE — 85027 COMPLETE CBC AUTOMATED: CPT

## 2022-04-09 PROCEDURE — 80048 BASIC METABOLIC PNL TOTAL CA: CPT

## 2022-04-09 PROCEDURE — 99232 SBSQ HOSP IP/OBS MODERATE 35: CPT | Performed by: FAMILY MEDICINE

## 2022-04-09 PROCEDURE — 82330 ASSAY OF CALCIUM: CPT | Performed by: FAMILY MEDICINE

## 2022-04-09 PROCEDURE — C9113 INJ PANTOPRAZOLE SODIUM, VIA: HCPCS

## 2022-04-09 RX ORDER — POTASSIUM CHLORIDE 14.9 MG/ML
20 INJECTION INTRAVENOUS ONCE
Status: COMPLETED | OUTPATIENT
Start: 2022-04-09 | End: 2022-04-09

## 2022-04-09 RX ADMIN — CLOTRIMAZOLE: 10 CREAM TOPICAL at 17:01

## 2022-04-09 RX ADMIN — HEPARIN SODIUM 5000 UNITS: 5000 INJECTION INTRAVENOUS; SUBCUTANEOUS at 15:56

## 2022-04-09 RX ADMIN — PANTOPRAZOLE SODIUM 40 MG: 40 INJECTION, POWDER, FOR SOLUTION INTRAVENOUS at 09:15

## 2022-04-09 RX ADMIN — CLOTRIMAZOLE: 10 CREAM TOPICAL at 11:42

## 2022-04-09 RX ADMIN — CYANOCOBALAMIN 1000 MCG: 1000 INJECTION INTRAMUSCULAR; SUBCUTANEOUS at 09:16

## 2022-04-09 RX ADMIN — HEPARIN SODIUM 5000 UNITS: 5000 INJECTION INTRAVENOUS; SUBCUTANEOUS at 21:29

## 2022-04-09 RX ADMIN — POTASSIUM CHLORIDE 20 MEQ: 14.9 INJECTION, SOLUTION INTRAVENOUS at 11:45

## 2022-04-09 RX ADMIN — HEPARIN SODIUM 5000 UNITS: 5000 INJECTION INTRAVENOUS; SUBCUTANEOUS at 05:18

## 2022-04-09 NOTE — ASSESSMENT & PLAN NOTE
Lab Results   Component Value Date    CREATININE 0 88 04/09/2022   Creatinine 1 39 on admission  Improved to 1 02 with IV fluid hydration  · Unclear baseline, no prior labs available in care everywhere     · Suspect prerenal secondary to dehyrdration given poor PO intake  · Patient received contrast for CT a/p, d/c IV fluids w/advanced diet  · Trend BMP  · Avoid nephrotoxins, hypotension

## 2022-04-09 NOTE — ASSESSMENT & PLAN NOTE
· Hgb 11 2 on admission   · Hemoglobin dropped to 10 3 with hydration  Also due to underlying colonic malignancy    Currently hemoglobin stable around 8 8 no need to check any further  · Denies melena, hematochezia  · normal folate, low B12 and iron panel, occult stool pending

## 2022-04-09 NOTE — PROGRESS NOTES
Progress Note - General Surgery   Alize Vail Sr  81 y o  male MRN: [id number]  Unit/Bed#: -01 Encounter: [id number]    Assessment:  Postoperative day 4 status post right hemicolectomy  Tolerating clear liquid diet    Plan: Will slowly increase diet  Encourage out of bed  Physical therapy  SCDs    Subjective/Objective     Subjective:  Patient states no abdominal pain nausea or vomiting  He denies any leg pain  He has no new complaints on today's evaluation  He is tolerating his clear liquids  Passing flatus and no bowel movement yet  Objective:     Blood pressure 111/56, pulse 72, temperature 98 6 °F (37 °C), resp  rate 18, height 5' 6" (1 676 m), weight 53 5 kg (118 lb), SpO2 99 %  ,Body mass index is 19 05 kg/m²  Intake/Output Summary (Last 24 hours) at 4/9/2022 0856  Last data filed at 4/9/2022 0448  Gross per 24 hour   Intake 120 ml   Output 1250 ml   Net -1130 ml       Invasive Devices  Report    Peripheral Intravenous Line            Peripheral IV 04/08/22 Distal;Dorsal (posterior); Right Forearm <1 day                Physical Exam:  Abdomen is soft with dressing in place    Nontender no distension  Extremities nontender and no edema    Lab, Imaging and other studies:  CBC:   Lab Results   Component Value Date    WBC 6 10 04/09/2022    HGB 8 8 (L) 04/09/2022    HCT 27 0 (L) 04/09/2022    MCV 87 04/09/2022     04/09/2022    MCH 28 5 04/09/2022    MCHC 32 6 04/09/2022    RDW 14 6 04/09/2022    MPV 8 4 (L) 04/09/2022   , CMP:   Lab Results   Component Value Date    SODIUM 139 04/09/2022    K 3 3 (L) 04/09/2022     04/09/2022    CO2 27 04/09/2022    BUN 13 04/09/2022    CREATININE 0 88 04/09/2022    CALCIUM 7 8 (L) 04/09/2022    EGFR 80 04/09/2022     VTE Pharmacologic Prophylaxis: Heparin  VTE Mechanical Prophylaxis: sequential compression device

## 2022-04-09 NOTE — PLAN OF CARE
Problem: MOBILITY - ADULT  Goal: Maintain or return to baseline ADL function  Description: INTERVENTIONS:  -  Assess patient's ability to carry out ADLs; assess patient's baseline for ADL function and identify physical deficits which impact ability to perform ADLs (bathing, care of mouth/teeth, toileting, grooming, dressing, etc )  - Assess/evaluate cause of self-care deficits   - Assess range of motion  - Assess patient's mobility; develop plan if impaired  - Assess patient's need for assistive devices and provide as appropriate  - Encourage maximum independence but intervene and supervise when necessary  - Involve family in performance of ADLs  - Assess for home care needs following discharge   - Consider OT consult to assist with ADL evaluation and planning for discharge  - Provide patient education as appropriate  Outcome: Progressing  Goal: Maintains/Returns to pre admission functional level  Description: INTERVENTIONS:  - Perform BMAT or MOVE assessment daily    - Set and communicate daily mobility goal to care team and patient/family/caregiver     - Collaborate with rehabilitation services on mobility goals if consulted  - Out of bed for toileting  - Record patient progress and toleration of activity level   Outcome: Progressing     Problem: Prexisting or High Potential for Compromised Skin Integrity  Goal: Skin integrity is maintained or improved  Description: INTERVENTIONS:  - Identify patients at risk for skin breakdown  - Assess and monitor skin integrity  - Assess and monitor nutrition and hydration status  - Monitor labs   - Assess for incontinence   - Turn and reposition patient  - Assist with mobility/ambulation  - Relieve pressure over bony prominences  - Avoid friction and shearing  - Provide appropriate hygiene as needed including keeping skin clean and dry  - Evaluate need for skin moisturizer/barrier cream  - Collaborate with interdisciplinary team   - Patient/family teaching  - Consider wound care consult   Outcome: Progressing     Problem: Nutrition/Hydration-ADULT  Goal: Nutrient/Hydration intake appropriate for improving, restoring or maintaining nutritional needs  Description: Monitor and assess patient's nutrition/hydration status for malnutrition  Collaborate with interdisciplinary team and initiate plan and interventions as ordered  Monitor patient's weight and dietary intake as ordered or per policy  Utilize nutrition screening tool and intervene as necessary  Determine patient's food preferences and provide high-protein, high-caloric foods as appropriate       INTERVENTIONS:  - Monitor oral intake, urinary output, labs, and treatment plans  - Assess nutrition and hydration status and recommend course of action  - Evaluate amount of meals eaten  - Assist patient with eating if necessary   - Allow adequate time for meals  - Recommend/ encourage appropriate diets, oral nutritional supplements, and vitamin/mineral supplements  - Order, calculate, and assess calorie counts as needed  - Recommend, monitor, and adjust tube feedings and TPN/PPN based on assessed needs  - Assess need for intravenous fluids  - Provide specific nutrition/hydration education as appropriate  - Include patient/family/caregiver in decisions related to nutrition  Outcome: Progressing     Problem: PAIN - ADULT  Goal: Verbalizes/displays adequate comfort level or baseline comfort level  Description: Interventions:  - Encourage patient to monitor pain and request assistance  - Assess pain using appropriate pain scale  - Administer analgesics based on type and severity of pain and evaluate response  - Implement non-pharmacological measures as appropriate and evaluate response  - Consider cultural and social influences on pain and pain management  - Notify physician/advanced practitioner if interventions unsuccessful or patient reports new pain  Outcome: Progressing     Problem: GASTROINTESTINAL - ADULT  Goal: Minimal or absence of nausea and/or vomiting  Description: INTERVENTIONS:  - Administer IV fluids if ordered to ensure adequate hydration  - Maintain NPO status until nausea and vomiting are resolved  - Nasogastric tube if ordered  - Administer ordered antiemetic medications as needed  - Provide nonpharmacologic comfort measures as appropriate  - Advance diet as tolerated, if ordered  - Consider nutrition services referral to assist patient with adequate nutrition and appropriate food choices  Outcome: Progressing  Goal: Maintains or returns to baseline bowel function  Description: INTERVENTIONS:  - Assess bowel function  - Encourage oral fluids to ensure adequate hydration  - Administer IV fluids if ordered to ensure adequate hydration  - Administer ordered medications as needed  - Encourage mobilization and activity  - Consider nutritional services referral to assist patient with adequate nutrition and appropriate food choices  Outcome: Progressing  Goal: Maintains adequate nutritional intake  Description: INTERVENTIONS:  - Monitor percentage of each meal consumed  - Identify factors contributing to decreased intake, treat as appropriate  - Assist with meals as needed  - Monitor I&O, weight, and lab values if indicated  - Obtain nutrition services referral as needed  Outcome: Progressing  Goal: Oral mucous membranes remain intact  Description: INTERVENTIONS  - Assess oral mucosa and hygiene practices  - Implement preventative oral hygiene regimen  - Implement oral medicated treatments as ordered  - Initiate Nutrition services referral as needed  Outcome: Progressing

## 2022-04-09 NOTE — ASSESSMENT & PLAN NOTE
Malnutrition Findings:   Adult Malnutrition type: Chronic illness  Adult Degree of Malnutrition: Other severe protein calorie malnutrition  Malnutrition Characteristics: Muscle loss,Fat loss,Inadequate energy  ·     · Reports 15 lb weight loss over the last 1-2 months with poor PO intake  Cachectic appearing  · Consult placed to nutrition for further recommendations            secondary to colonic malignancy  Currently NPO due to small-bowel obstruction  Continue IV fluid hydration      360 Statement: Chronic severe malnutrition related to decreased appetite, N/V, dry heaves, colonic mass as evidenced by < 75% estimated energy intake > 1 mo, Severe muscle loss to pectoralis, deltoid, gastrocnemius and quadriceps muscles + scapula region, severe fat loss to buccal pads  Treated with: Pt currently NPO for possible surgery pending family discussion  Once appropriate for oral diet, would recommend regular diet with ensure enlive TID  Consider appetite stimulant as medically appropriate  Recommend daily weights for nutrition monitoring  BMI Findings: Body mass index is 19 05 kg/m²

## 2022-04-09 NOTE — ASSESSMENT & PLAN NOTE
· Presents from home with nausea, dry heaves,hiccups, loss of appetite and 15 lb weight loss over the last month  Reports father with history of colon cancer diagnosed at age 80  · No prior colonoscopies or cancer history per patient and family  Denies medical/surgical history  Has not seen doctors or followed up with lab testing or screening test in years  · CT a/p w contrast: "Colonic mass involving the hepatic flexure where there is marked circumferential wall thickening and significant luminal narrowing with multiple fluid-filled dilated loops of since distal small bowel, and fluid distended proximal colon compatible with obstruction  No pneumatosis or free air  No definite metastatic disease  Haziness involving the omentum in the right upper quadrant adjacent to the mass which is nonspecific but is concerning for localized carcinomatosis "  · CT chest negative for malignancy  · POD4: hemicolectomy w/anatamosis  · Advance diet as per surgery  Currently await return of bowel function  Patient denies any abdominal pain or nausea vomiting at this time    Admits to flatus but no BM yet

## 2022-04-09 NOTE — PROGRESS NOTES
[address]  Progress Note - Vikki Kebede Sr  1940, 81 y o  male MRN: [id number]  Unit/Bed#: -01 Encounter: [id number]  Primary Care Provider: Snow Jacobs DO   Date and time admitted to hospital: 4/4/2022  7:28 PM    * Small bowel obstruction [facility])  Assessment & Plan  · Presents from home with nausea, dry heaves,hiccups, loss of appetite and 15 lb weight loss over the last month  Reports father with history of colon cancer diagnosed at age 80  · No prior colonoscopies or cancer history per patient and family  Denies medical/surgical history  Has not seen doctors or followed up with lab testing or screening test in years  · CT a/p w contrast: "Colonic mass involving the hepatic flexure where there is marked circumferential wall thickening and significant luminal narrowing with multiple fluid-filled dilated loops of since distal small bowel, and fluid distended proximal colon compatible with obstruction  No pneumatosis or free air  No definite metastatic disease  Haziness involving the omentum in the right upper quadrant adjacent to the mass which is nonspecific but is concerning for localized carcinomatosis "  · CT chest negative for malignancy  · POD4: hemicolectomy w/anatamosis  · Advance diet as per surgery  Currently await return of bowel function  Patient denies any abdominal pain or nausea vomiting at this time    Admits to flatus but no BM yet    Colonic mass  Assessment & Plan  · Obstructing mass distal to hepatic flexure  · POD#4 S/p ex lap, right hemicolectomy with anastomosis  · Surgical pathology pending    Severe protein-calorie malnutrition ([facility] [address] )  Assessment & Plan  Malnutrition Findings:   Adult Malnutrition type: Chronic illness  Adult Degree of Malnutrition: Other severe protein calorie malnutrition  Malnutrition Characteristics: Muscle loss,Fat loss,Inadequate energy  ·     · Reports 15 lb weight loss over the last 1-2 months with poor PO intake  Cachectic appearing  · Consult placed to nutrition for further recommendations            secondary to colonic malignancy  Currently NPO due to small-bowel obstruction  Continue IV fluid hydration      360 Statement: Chronic severe malnutrition related to decreased appetite, N/V, dry heaves, colonic mass as evidenced by < 75% estimated energy intake > 1 mo, Severe muscle loss to pectoralis, deltoid, gastrocnemius and quadriceps muscles + scapula region, severe fat loss to buccal pads  Treated with: Pt currently NPO for possible surgery pending family discussion  Once appropriate for oral diet, would recommend regular diet with ensure enlive TID  Consider appetite stimulant as medically appropriate  Recommend daily weights for nutrition monitoring  BMI Findings: Body mass index is 19 05 kg/m²  JENSEN (acute kidney injury) [facility])  Assessment & Plan  Lab Results   Component Value Date    CREATININE 0 88 04/09/2022   Creatinine 1 39 on admission  Improved to 1 02 with IV fluid hydration  · Unclear baseline, no prior labs available in care everywhere  · Suspect prerenal secondary to dehyrdration given poor PO intake  · Patient received contrast for CT a/p, d/c IV fluids w/advanced diet  · Trend BMP  · Avoid nephrotoxins, hypotension       Acute on chronic anemia  Assessment & Plan  · Hgb 11 2 on admission   · Hemoglobin dropped to 10 3 with hydration  Also due to underlying colonic malignancy  Currently hemoglobin stable around 8 8 no need to check any further  · Denies melena, hematochezia  · normal folate, low B12 and iron panel, occult stool pending    Hypokalemia and hypocalcemia:  Replace IV potassium and IV calcium and recheck labs again tomorrow   VTE Pharmacologic Prophylaxis:   Pharmacologic: Heparin  Mechanical VTE Prophylaxis in Place: Yes    Patient Centered Rounds: I have performed bedside rounds with nursing staff today      Discussions with Specialists or Other Care Team Provider:  Discussed with general surgery    Education and Discussions with Family / Patient:  Discussed with patient bedside    Time Spent for Care: 30 minutes  More than 50% of total time spent on counseling and coordination of care as described above  Current Length of Stay: 5 day(s)    Current Patient Status: Inpatient   Certification Statement: The patient will continue to require additional inpatient hospital stay due to Small-bowel obstruction    Discharge Plan:  Pending progress  Await return of bowel function    Code Status: Level 1 - Full Code      Subjective:   Patient denies any chest pain or shortness of breath or abdominal pain  He states he is trying to eat a little but no bowel movements yet    Objective:     Vitals:   Temp (24hrs), Av 7 °F (37 1 °C), Min:98 6 °F (37 °C), Max:98 8 °F (37 1 °C)    Temp:  [98 6 °F (37 °C)-98 8 °F (37 1 °C)] 98 6 °F (37 °C)  HR:  [72-98] 72  Resp:  [17-18] 18  BP: (111-144)/(56-77) 111/56  SpO2:  [97 %-99 %] 99 %  Body mass index is 19 05 kg/m²  Input and Output Summary (last 24 hours): Intake/Output Summary (Last 24 hours) at 2022 1100  Last data filed at 2022 0915  Gross per 24 hour   Intake 120 ml   Output 1450 ml   Net -1330 ml       Physical Exam:     Physical Exam  Vitals and nursing note reviewed  Constitutional:       Appearance: Normal appearance  HENT:      Head: Normocephalic and atraumatic  Right Ear: External ear normal       Left Ear: External ear normal       Nose: Nose normal       Mouth/Throat:      Pharynx: Oropharynx is clear  Cardiovascular:      Rate and Rhythm: Normal rate and regular rhythm  Heart sounds: Normal heart sounds  Pulmonary:      Effort: Pulmonary effort is normal       Breath sounds: Normal breath sounds  Abdominal:      Palpations: Abdomen is soft  Tenderness: There is no abdominal tenderness  Comments: Incision site midline covered with dressing    Hypoactive bowel sounds  No tenderness on palpation   Musculoskeletal:         General: Normal range of motion  Cervical back: Normal range of motion and neck supple  Skin:     General: Skin is warm and dry  Capillary Refill: Capillary refill takes less than 2 seconds  Neurological:      General: No focal deficit present  Mental Status: He is alert  Comments: Oriented to person and place   Psychiatric:         Mood and Affect: Mood normal            Additional Data:     Labs:    Results from last 7 days   Lab Units 04/09/22  0519 04/08/22  0500 04/07/22  0553   WBC Thousand/uL 6 10   < > 9 49   HEMOGLOBIN g/dL 8 8*   < > 9 8*   HEMATOCRIT % 27 0*   < > 30 2*   PLATELETS Thousands/uL 205   < > 226   NEUTROS PCT %  --   --  79*   LYMPHS PCT %  --   --  9*   MONOS PCT %  --   --  11   EOS PCT %  --   --  0    < > = values in this interval not displayed  Results from last 7 days   Lab Units 04/09/22  0519 04/06/22  0509 04/05/22  0517   SODIUM mmol/L 139   < > 137   POTASSIUM mmol/L 3 3*   < > 3 3*   CHLORIDE mmol/L 107   < > 103   CO2 mmol/L 27   < > 27   BUN mg/dL 13   < > 17   CREATININE mg/dL 0 88   < > 1 02   ANION GAP mmol/L 5   < > 7   CALCIUM mg/dL 7 8*   < > 7 8*   ALBUMIN g/dL  --   --  2 9*   TOTAL BILIRUBIN mg/dL  --   --  0 34   ALK PHOS U/L  --   --  105   ALT U/L  --   --  19   AST U/L  --   --  21   GLUCOSE RANDOM mg/dL 93   < > 101    < > = values in this interval not displayed  * I Have Reviewed All Lab Data Listed Above  * Additional Pertinent Lab Tests Reviewed:  [facility]    Imaging:    Imaging Reports Reviewed Today Include:  None  Imaging Personally Reviewed by Myself Includes:  None    Recent Cultures (last 7 days):           Last 24 Hours Medication List:   Current Facility-Administered Medications   Medication Dose Route Frequency Provider Last Rate    acetaminophen  650 mg Oral Q8H PRN Ar Gao PA-C      clotrimazole   Topical BID FILI Macias      heparin (porcine)  5,000 Units Subcutaneous [facility], [place]      HYDROmorphone  0 2 mg Intravenous Q4H PRN FILI Macias      ondansetron  4 mg Intravenous Q6H PRN Thang Backbone, DO      oxyCODONE  2 5 mg Oral Q6H PRN Jay Guan PA-C      pantoprazole  40 mg Intravenous Q24H [address] FILI Macias      potassium chloride  20 mEq Intravenous Once Julissa Conley MD          Today, Patient Was Seen By: Julissa Conley MD    ** Please Note: Dictation voice to text software may have been used in the creation of this document   **

## 2022-04-09 NOTE — ASSESSMENT & PLAN NOTE
· Obstructing mass distal to hepatic flexure  · POD#4 S/p ex lap, right hemicolectomy with anastomosis  · Surgical pathology pending

## 2022-04-10 LAB
ALBUMIN SERPL BCP-MCNC: 2.3 G/DL (ref 3.5–5)
ALP SERPL-CCNC: 76 U/L (ref 46–116)
ALT SERPL W P-5'-P-CCNC: 14 U/L (ref 12–78)
ANION GAP SERPL CALCULATED.3IONS-SCNC: 8 MMOL/L (ref 4–13)
AST SERPL W P-5'-P-CCNC: 24 U/L (ref 5–45)
BILIRUB SERPL-MCNC: 0.34 MG/DL (ref 0.2–1)
BUN SERPL-MCNC: 14 MG/DL (ref 5–25)
CALCIUM ALBUM COR SERPL-MCNC: 9 MG/DL (ref 8.3–10.1)
CALCIUM SERPL-MCNC: 7.6 MG/DL (ref 8.3–10.1)
CHLORIDE SERPL-SCNC: 105 MMOL/L (ref 100–108)
CO2 SERPL-SCNC: 26 MMOL/L (ref 21–32)
CREAT SERPL-MCNC: 0.83 MG/DL (ref 0.6–1.3)
GFR SERPL CREATININE-BSD FRML MDRD: 82 ML/MIN/1.73SQ M
GLUCOSE SERPL-MCNC: 84 MG/DL (ref 65–140)
POTASSIUM SERPL-SCNC: 3.2 MMOL/L (ref 3.5–5.3)
PROT SERPL-MCNC: 5.8 G/DL (ref 6.4–8.2)
SODIUM SERPL-SCNC: 139 MMOL/L (ref 136–145)

## 2022-04-10 PROCEDURE — 99232 SBSQ HOSP IP/OBS MODERATE 35: CPT | Performed by: FAMILY MEDICINE

## 2022-04-10 PROCEDURE — 80053 COMPREHEN METABOLIC PANEL: CPT | Performed by: FAMILY MEDICINE

## 2022-04-10 RX ORDER — BISACODYL 10 MG
10 SUPPOSITORY, RECTAL RECTAL DAILY
Status: DISCONTINUED | OUTPATIENT
Start: 2022-04-10 | End: 2022-04-11 | Stop reason: HOSPADM

## 2022-04-10 RX ORDER — PANTOPRAZOLE SODIUM 40 MG/1
40 TABLET, DELAYED RELEASE ORAL
Status: DISCONTINUED | OUTPATIENT
Start: 2022-04-10 | End: 2022-04-11 | Stop reason: HOSPADM

## 2022-04-10 RX ORDER — CYANOCOBALAMIN 1000 UG/ML
1000 INJECTION INTRAMUSCULAR; SUBCUTANEOUS DAILY
Status: DISCONTINUED | OUTPATIENT
Start: 2022-04-10 | End: 2022-04-11 | Stop reason: HOSPADM

## 2022-04-10 RX ORDER — CALCIUM GLUCONATE 20 MG/ML
1 INJECTION, SOLUTION INTRAVENOUS ONCE
Status: COMPLETED | OUTPATIENT
Start: 2022-04-10 | End: 2022-04-10

## 2022-04-10 RX ORDER — POTASSIUM CHLORIDE 14.9 MG/ML
20 INJECTION INTRAVENOUS ONCE
Status: COMPLETED | OUTPATIENT
Start: 2022-04-10 | End: 2022-04-10

## 2022-04-10 RX ADMIN — CLOTRIMAZOLE: 10 CREAM TOPICAL at 18:19

## 2022-04-10 RX ADMIN — POTASSIUM CHLORIDE 20 MEQ: 14.9 INJECTION, SOLUTION INTRAVENOUS at 11:26

## 2022-04-10 RX ADMIN — CLOTRIMAZOLE: 10 CREAM TOPICAL at 10:41

## 2022-04-10 RX ADMIN — CALCIUM GLUCONATE 1 G: 20 INJECTION, SOLUTION INTRAVENOUS at 10:34

## 2022-04-10 RX ADMIN — PANTOPRAZOLE SODIUM 40 MG: 40 TABLET, DELAYED RELEASE ORAL at 10:32

## 2022-04-10 RX ADMIN — HEPARIN SODIUM 5000 UNITS: 5000 INJECTION INTRAVENOUS; SUBCUTANEOUS at 14:53

## 2022-04-10 RX ADMIN — CYANOCOBALAMIN 1000 MCG: 1000 INJECTION INTRAMUSCULAR; SUBCUTANEOUS at 10:39

## 2022-04-10 RX ADMIN — HEPARIN SODIUM 5000 UNITS: 5000 INJECTION INTRAVENOUS; SUBCUTANEOUS at 05:30

## 2022-04-10 RX ADMIN — HEPARIN SODIUM 5000 UNITS: 5000 INJECTION INTRAVENOUS; SUBCUTANEOUS at 20:27

## 2022-04-10 NOTE — NURSING NOTE
Patient declining to take suppository as he stated he had a bowel movement this morning during AM care  Confirmed with Jaky Slice PCA patient did have a small, soft brown BM  Patient stated he didn't strain to have BM and denies any abdominal cramping or fullness  Will hold per patient request and continue to monitor

## 2022-04-10 NOTE — PLAN OF CARE
Problem: MOBILITY - ADULT  Goal: Maintain or return to baseline ADL function  Description: INTERVENTIONS:  -  Assess patient's ability to carry out ADLs; assess patient's baseline for ADL function and identify physical deficits which impact ability to perform ADLs (bathing, care of mouth/teeth, toileting, grooming, dressing, etc )  - Assess/evaluate cause of self-care deficits   - Assess range of motion  - Assess patient's mobility; develop plan if impaired  - Assess patient's need for assistive devices and provide as appropriate  - Encourage maximum independence but intervene and supervise when necessary  - Involve family in performance of ADLs  - Assess for home care needs following discharge   - Consider OT consult to assist with ADL evaluation and planning for discharge  - Provide patient education as appropriate  Outcome: Progressing  Goal: Maintains/Returns to pre admission functional level  Description: INTERVENTIONS:  - Perform BMAT or MOVE assessment daily    - Set and communicate daily mobility goal to care team and patient/family/caregiver     - Collaborate with rehabilitation services on mobility goals if consulted  - Out of bed for toileting  - Record patient progress and toleration of activity level   Outcome: Progressing     Problem: Potential for Falls  Goal: Patient will remain free of falls  Description: INTERVENTIONS:  - Educate patient/family on patient safety including physical limitations  - Instruct patient to call for assistance with activity   - Consult OT/PT to assist with strengthening/mobility   - Keep Call bell within reach  - Keep bed low and locked with side rails adjusted as appropriate  - Keep care items and personal belongings within reach  - Initiate and maintain comfort rounds  - Make Fall Risk Sign visible to staff  - Apply yellow socks and bracelet for high fall risk patients  - Consider moving patient to room near nurses station  Outcome: Progressing     Problem: Prexisting or High Potential for Compromised Skin Integrity  Goal: Skin integrity is maintained or improved  Description: INTERVENTIONS:  - Identify patients at risk for skin breakdown  - Assess and monitor skin integrity  - Assess and monitor nutrition and hydration status  - Monitor labs   - Assess for incontinence   - Turn and reposition patient  - Assist with mobility/ambulation  - Relieve pressure over bony prominences  - Avoid friction and shearing  - Provide appropriate hygiene as needed including keeping skin clean and dry  - Evaluate need for skin moisturizer/barrier cream  - Collaborate with interdisciplinary team   - Patient/family teaching  - Consider wound care consult   Outcome: Progressing     Problem: Nutrition/Hydration-ADULT  Goal: Nutrient/Hydration intake appropriate for improving, restoring or maintaining nutritional needs  Description: Monitor and assess patient's nutrition/hydration status for malnutrition  Collaborate with interdisciplinary team and initiate plan and interventions as ordered  Monitor patient's weight and dietary intake as ordered or per policy  Utilize nutrition screening tool and intervene as necessary  Determine patient's food preferences and provide high-protein, high-caloric foods as appropriate       INTERVENTIONS:  - Monitor oral intake, urinary output, labs, and treatment plans  - Assess nutrition and hydration status and recommend course of action  - Evaluate amount of meals eaten  - Assist patient with eating if necessary   - Allow adequate time for meals  - Recommend/ encourage appropriate diets, oral nutritional supplements, and vitamin/mineral supplements  - Order, calculate, and assess calorie counts as needed  - Recommend, monitor, and adjust tube feedings and TPN/PPN based on assessed needs  - Assess need for intravenous fluids  - Provide specific nutrition/hydration education as appropriate  - Include patient/family/caregiver in decisions related to nutrition  Outcome: Progressing     Problem: PAIN - ADULT  Goal: Verbalizes/displays adequate comfort level or baseline comfort level  Description: Interventions:  - Encourage patient to monitor pain and request assistance  - Assess pain using appropriate pain scale  - Administer analgesics based on type and severity of pain and evaluate response  - Implement non-pharmacological measures as appropriate and evaluate response  - Consider cultural and social influences on pain and pain management  - Notify physician/advanced practitioner if interventions unsuccessful or patient reports new pain  Outcome: Progressing     Problem: GASTROINTESTINAL - ADULT  Goal: Minimal or absence of nausea and/or vomiting  Description: INTERVENTIONS:  - Administer IV fluids if ordered to ensure adequate hydration  - Maintain NPO status until nausea and vomiting are resolved  - Nasogastric tube if ordered  - Administer ordered antiemetic medications as needed  - Provide nonpharmacologic comfort measures as appropriate  - Advance diet as tolerated, if ordered  - Consider nutrition services referral to assist patient with adequate nutrition and appropriate food choices  Outcome: Progressing  Goal: Maintains or returns to baseline bowel function  Description: INTERVENTIONS:  - Assess bowel function  - Encourage oral fluids to ensure adequate hydration  - Administer IV fluids if ordered to ensure adequate hydration  - Administer ordered medications as needed  - Encourage mobilization and activity  - Consider nutritional services referral to assist patient with adequate nutrition and appropriate food choices  Outcome: Progressing  Goal: Maintains adequate nutritional intake  Description: INTERVENTIONS:  - Monitor percentage of each meal consumed  - Identify factors contributing to decreased intake, treat as appropriate  - Assist with meals as needed  - Monitor I&O, weight, and lab values if indicated  - Obtain nutrition services referral as needed  Outcome: Progressing  Goal: Oral mucous membranes remain intact  Description: INTERVENTIONS  - Assess oral mucosa and hygiene practices  - Implement preventative oral hygiene regimen  - Implement oral medicated treatments as ordered  - Initiate Nutrition services referral as needed  Outcome: Progressing

## 2022-04-10 NOTE — ASSESSMENT & PLAN NOTE
Lab Results   Component Value Date    CREATININE 0 83 04/10/2022   Creatinine 1 39 on admission  Improved to 1 02 with IV fluid hydration  · Unclear baseline, no prior labs available in care everywhere     · Suspect prerenal secondary to dehyrdration given poor PO intake  · Patient received contrast for CT a/p, d/c IV fluids w/advanced diet  · Trend BMP  · Avoid nephrotoxins, hypotension   · RESOLVED

## 2022-04-10 NOTE — ASSESSMENT & PLAN NOTE
· Hgb 11 2 on admission   · Hemoglobin dropped to 10 3 with hydration  Also due to underlying colonic malignancy  Currently hemoglobin stable around 8 8 due to recent surgery blood loss as expected   no need to check any further  · Denies melena, hematochezia  · normal folate, low B12 and iron panel,

## 2022-04-10 NOTE — PLAN OF CARE
Problem: Potential for Falls  Goal: Patient will remain free of falls  Description: INTERVENTIONS:  - Educate patient/family on patient safety including physical limitations  - Instruct patient to call for assistance with activity   - Consult OT/PT to assist with strengthening/mobility   - Keep Call bell within reach  - Keep bed low and locked with side rails adjusted as appropriate  - Keep care items and personal belongings within reach  - Initiate and maintain comfort rounds  - Make Fall Risk Sign visible to staff  - Offer Toileting every 2 Hours, in advance of need  - Initiate/Maintain bed/chair alarm  - Apply yellow socks and bracelet for high fall risk patients  - Consider moving patient to room near nurses station  Outcome: Progressing     Problem: Nutrition/Hydration-ADULT  Goal: Nutrient/Hydration intake appropriate for improving, restoring or maintaining nutritional needs  Description: Monitor and assess patient's nutrition/hydration status for malnutrition  Collaborate with interdisciplinary team and initiate plan and interventions as ordered  Monitor patient's weight and dietary intake as ordered or per policy  Utilize nutrition screening tool and intervene as necessary  Determine patient's food preferences and provide high-protein, high-caloric foods as appropriate       INTERVENTIONS:  - Monitor oral intake, urinary output, labs, and treatment plans  - Assess nutrition and hydration status and recommend course of action  - Evaluate amount of meals eaten  - Assist patient with eating if necessary   - Allow adequate time for meals  - Recommend/ encourage appropriate diets, oral nutritional supplements, and vitamin/mineral supplements  - Order, calculate, and assess calorie counts as needed  - Recommend, monitor, and adjust tube feedings and TPN/PPN based on assessed needs  - Assess need for intravenous fluids  - Provide specific nutrition/hydration education as appropriate  - Include patient/family/caregiver in decisions related to nutrition  Outcome: Progressing     Problem: GASTROINTESTINAL - ADULT  Goal: Minimal or absence of nausea and/or vomiting  Description: INTERVENTIONS:  - Administer IV fluids if ordered to ensure adequate hydration  - Maintain NPO status until nausea and vomiting are resolved  - Nasogastric tube if ordered  - Administer ordered antiemetic medications as needed  - Provide nonpharmacologic comfort measures as appropriate  - Advance diet as tolerated, if ordered  - Consider nutrition services referral to assist patient with adequate nutrition and appropriate food choices  Outcome: Progressing  Goal: Maintains or returns to baseline bowel function  Description: INTERVENTIONS:  - Assess bowel function  - Encourage oral fluids to ensure adequate hydration  - Administer IV fluids if ordered to ensure adequate hydration  - Administer ordered medications as needed  - Encourage mobilization and activity  - Consider nutritional services referral to assist patient with adequate nutrition and appropriate food choices  Outcome: Progressing  Goal: Maintains adequate nutritional intake  Description: INTERVENTIONS:  - Monitor percentage of each meal consumed  - Identify factors contributing to decreased intake, treat as appropriate  - Assist with meals as needed  - Monitor I&O, weight, and lab values if indicated  - Obtain nutrition services referral as needed  Outcome: Progressing  Goal: Oral mucous membranes remain intact  Description: INTERVENTIONS  - Assess oral mucosa and hygiene practices  - Implement preventative oral hygiene regimen  - Implement oral medicated treatments as ordered  - Initiate Nutrition services referral as needed  Outcome: Progressing

## 2022-04-10 NOTE — ASSESSMENT & PLAN NOTE
· Noted bilateral lower extremity circular rash erythematous border with central clearing, covering bilateral shins, lower extremities  Patient states it has been there for a while and noted following being in the woods  He states that it is itchy at times  · Rash appears to be ringworm given the appearance, and spread isolated to lower extremities  · Miconazole cream b i d   To affected area- changed to clotrimazole 1% BID [id number]

## 2022-04-10 NOTE — ASSESSMENT & PLAN NOTE
· Presents from home with nausea, dry heaves,hiccups, loss of appetite and 15 lb weight loss over the last month  Reports father with history of colon cancer diagnosed at age 80  · No prior colonoscopies or cancer history per patient and family  Denies medical/surgical history  Has not seen doctors or followed up with lab testing or screening test in years  · CT a/p w contrast: "Colonic mass involving the hepatic flexure where there is marked circumferential wall thickening and significant luminal narrowing with multiple fluid-filled dilated loops of since distal small bowel, and fluid distended proximal colon compatible with obstruction  No pneumatosis or free air  No definite metastatic disease  Haziness involving the omentum in the right upper quadrant adjacent to the mass which is nonspecific but is concerning for localized carcinomatosis "  · CT chest negative for malignancy  · POD5: hemicolectomy w/anatamosis  · Advance diet as per surgery  Currently await return of bowel function  Patient denies any abdominal pain or nausea vomiting at this time  Admits to flatus but no BM yet  Will place on Dulcolax suppository daily for now

## 2022-04-10 NOTE — PROGRESS NOTES
Progress Note - General Surgery   Zenon Ibarra Sr  81 y o  male MRN: [id number]  Unit/Bed#: -01 Encounter: [id number]    Assessment:  Postoperative day 5 status post exploratory laparotomy right hemicolectomy  Tolerating diet without complaints  No bowel movement yet  Plan:  Suppository  Encourage p o     The patient is not interested in anything more to eat at this time  He is drinking his supplements without difficulty  Subjective/Objective       Subjective:  Patient denies any abdominal pain nausea vomiting  He is passing flatus but no bowel movement  Objective:     Blood pressure 115/59, pulse 73, temperature 98 2 °F (36 8 °C), resp  rate 17, height 5' 6" (1 676 m), weight 53 5 kg (118 lb), SpO2 100 %  ,Body mass index is 19 05 kg/m²  Intake/Output Summary (Last 24 hours) at 4/10/2022 0852  Last data filed at 4/10/2022 0805  Gross per 24 hour   Intake 480 ml   Output 1400 ml   Net -920 ml       Invasive Devices  Report    Peripheral Intravenous Line            Peripheral IV 04/08/22 Distal;Dorsal (posterior); Right Forearm 1 day                Physical Exam:  Patient is awake alert and orient x3 in no acute distress  Abdomen is soft and nontender with no rigidity guarding or rebound  Mepilex is in place with no drainage      Lab, Imaging and other studies:  CBC: No results found for: WBC, HGB, HCT, MCV, PLT, ADJUSTEDWBC, MCH, MCHC, RDW, MPV, NRBC, CMP:   Lab Results   Component Value Date    SODIUM 139 04/10/2022    K 3 2 (L) 04/10/2022     04/10/2022    CO2 26 04/10/2022    BUN 14 04/10/2022    CREATININE 0 83 04/10/2022    CALCIUM 7 6 (L) 04/10/2022    AST 24 04/10/2022    ALT 14 04/10/2022    ALKPHOS 76 04/10/2022    EGFR 82 04/10/2022     VTE Pharmacologic Prophylaxis: Heparin  VTE Mechanical Prophylaxis: sequential compression device

## 2022-04-10 NOTE — ASSESSMENT & PLAN NOTE
· Obstructing mass distal to hepatic flexure  · POD#5 S/p ex lap, right hemicolectomy with anastomosis  · Surgical pathology pending

## 2022-04-10 NOTE — PROGRESS NOTES
[address]  Progress Note - Layla Garcia Sr  1940, 81 y o  male MRN: [id number]  Unit/Bed#: -David Encounter: [id number]  Primary Care Provider: Bonita Everett DO   Date and time admitted to hospital: 4/4/2022  7:28 PM    * Small bowel obstruction [facility])  Assessment & Plan  · Presents from home with nausea, dry heaves,hiccups, loss of appetite and 15 lb weight loss over the last month  Reports father with history of colon cancer diagnosed at age 81  · No prior colonoscopies or cancer history per patient and family  Denies medical/surgical history  Has not seen doctors or followed up with lab testing or screening test in years  · CT a/p w contrast: "Colonic mass involving the hepatic flexure where there is marked circumferential wall thickening and significant luminal narrowing with multiple fluid-filled dilated loops of since distal small bowel, and fluid distended proximal colon compatible with obstruction  No pneumatosis or free air  No definite metastatic disease  Haziness involving the omentum in the right upper quadrant adjacent to the mass which is nonspecific but is concerning for localized carcinomatosis "  · CT chest negative for malignancy  · POD5: hemicolectomy w/anatamosis  · Advance diet as per surgery  Currently await return of bowel function  Patient denies any abdominal pain or nausea vomiting at this time  Admits to flatus but no BM yet  Will place on Dulcolax suppository daily for now  Tinea corporis  Assessment & Plan  · Noted bilateral lower extremity circular rash erythematous border with central clearing, covering bilateral shins, lower extremities  Patient states it has been there for a while and noted following being in the woods  He states that it is itchy at times  · Rash appears to be ringworm given the appearance, and spread isolated to lower extremities  · Miconazole cream b i d   To affected area- changed to clotrimazole 1% BID [id number]    Colonic mass  Assessment & Plan  · Obstructing mass distal to hepatic flexure  · POD#5 S/p ex lap, right hemicolectomy with anastomosis  · Surgical pathology pending    Severe protein-calorie malnutrition ([address] )  Assessment & Plan  Malnutrition Findings:   Adult Malnutrition type: Chronic illness  Adult Degree of Malnutrition: Other severe protein calorie malnutrition  Malnutrition Characteristics: Muscle loss,Fat loss,Inadequate energy  ·     · Reports 15 lb weight loss over the last 1-2 months with poor PO intake  Cachectic appearing  · Consult placed to nutrition for further recommendations            secondary to colonic malignancy  Currently NPO due to small-bowel obstruction  Continue IV fluid hydration      360 Statement: Chronic severe malnutrition related to decreased appetite, N/V, dry heaves, colonic mass as evidenced by < 75% estimated energy intake > 1 mo, Severe muscle loss to pectoralis, deltoid, gastrocnemius and quadriceps muscles + scapula region, severe fat loss to buccal pads  Treated with: Pt currently NPO for possible surgery pending family discussion  Once appropriate for oral diet, would recommend regular diet with ensure enlive TID  Consider appetite stimulant as medically appropriate  Recommend daily weights for nutrition monitoring  BMI Findings: Body mass index is 19 05 kg/m²  JENSEN (acute kidney injury) [facility])  Assessment & Plan  Lab Results   Component Value Date    CREATININE 0 83 04/10/2022   Creatinine 1 39 on admission  Improved to 1 02 with IV fluid hydration  · Unclear baseline, no prior labs available in care everywhere     · Suspect prerenal secondary to dehyrdration given poor PO intake  · Patient received contrast for CT a/p, d/c IV fluids w/advanced diet  · Trend BMP  · Avoid nephrotoxins, hypotension   · RESOLVED      Acute on chronic anemia  Assessment & Plan  · Hgb 11 2 on admission   · Hemoglobin dropped to 10 3 with hydration  Also due to underlying colonic malignancy  Currently hemoglobin stable around 8 8 due to recent surgery blood loss as expected  no need to check any further  · Denies melena, hematochezia  · normal folate, low B12 and iron panel,       VTE Pharmacologic Prophylaxis:   Pharmacologic: Heparin  Mechanical VTE Prophylaxis in Place: Yes    Patient Centered Rounds: I have performed bedside rounds with nursing staff today  Discussions with Specialists or Other Care Team Provider:  DISCUSSED WITH SURGERY    Education and Discussions with Family / Patient:  Discussed with patient bedside    Time Spent for Care: 30 minutes  More than 50% of total time spent on counseling and coordination of care as described above  Current Length of Stay: 6 day(s)    Current Patient Status: Inpatient   Certification Statement: The patient will continue to require additional inpatient hospital stay due to Small-bowel obstruction    Discharge Plan:  Plan to discharge home with home health in the next 24-48 hours once there is return of bowel function    Code Status: Level 1 - Full Code      Subjective:   Patient denies any chest pain or shortness of breath or abdominal pain  No bowel movement yet  Passing flatus    Objective:     Vitals:   Temp (24hrs), Av 2 °F (36 8 °C), Min:98 °F (36 7 °C), Max:98 4 °F (36 9 °C)    Temp:  [98 °F (36 7 °C)-98 4 °F (36 9 °C)] 98 2 °F (36 8 °C)  HR:  [73-82] 73  Resp:  [16-18] 17  BP: (112-115)/(59-61) 115/59  SpO2:  [98 %-100 %] 100 %  Body mass index is 19 05 kg/m²  Input and Output Summary (last 24 hours): Intake/Output Summary (Last 24 hours) at 4/10/2022 0923  Last data filed at 4/10/2022 0805  Gross per 24 hour   Intake 120 ml   Output 1200 ml   Net -1080 ml       Physical Exam:     Physical Exam  Vitals and nursing note reviewed  Constitutional:       Appearance: Normal appearance  HENT:      Head: Normocephalic and atraumatic        Right Ear: External ear normal  Left Ear: External ear normal       Nose: Nose normal       Mouth/Throat:      Pharynx: Oropharynx is clear  Cardiovascular:      Rate and Rhythm: Normal rate and regular rhythm  Heart sounds: Normal heart sounds  Pulmonary:      Effort: Pulmonary effort is normal       Breath sounds: Normal breath sounds  Abdominal:      General: Bowel sounds are normal       Palpations: Abdomen is soft  Tenderness: There is no abdominal tenderness  Comments: Midline incision site intact dressing placed   Musculoskeletal:         General: Normal range of motion  Cervical back: Normal range of motion and neck supple  Skin:     General: Skin is warm and dry  Capillary Refill: Capillary refill takes less than 2 seconds  Neurological:      General: No focal deficit present  Mental Status: He is alert and oriented to person, place, and time  Psychiatric:         Mood and Affect: Mood normal             Additional Data:     Labs:    Results from last 7 days   Lab Units 04/09/22  0519 04/08/22  0500 04/07/22  0553   WBC Thousand/uL 6 10   < > 9 49   HEMOGLOBIN g/dL 8 8*   < > 9 8*   HEMATOCRIT % 27 0*   < > 30 2*   PLATELETS Thousands/uL 205   < > 226   NEUTROS PCT %  --   --  79*   LYMPHS PCT %  --   --  9*   MONOS PCT %  --   --  11   EOS PCT %  --   --  0    < > = values in this interval not displayed  Results from last 7 days   Lab Units 04/10/22  0530   SODIUM mmol/L 139   POTASSIUM mmol/L 3 2*   CHLORIDE mmol/L 105   CO2 mmol/L 26   BUN mg/dL 14   CREATININE mg/dL 0 83   ANION GAP mmol/L 8   CALCIUM mg/dL 7 6*   ALBUMIN g/dL 2 3*   TOTAL BILIRUBIN mg/dL 0 34   ALK PHOS U/L 76   ALT U/L 14   AST U/L 24   GLUCOSE RANDOM mg/dL 84                           * I Have Reviewed All Lab Data Listed Above  * Additional Pertinent Lab Tests Reviewed:  All Labs For Current Hospital Admission Reviewed    Imaging:    Imaging Reports Reviewed Today Include: none  Imaging Personally Reviewed by Myself Includes:  none    Recent Cultures (last 7 days):           Last 24 Hours Medication List:   Current Facility-Administered Medications   Medication Dose Route Frequency Provider Last Rate    acetaminophen  650 mg Oral Q8H PRN Bhavya Duarte PA-C      bisacodyl  10 mg Rectal Daily Joan Figueroa MD      calcium gluconate  1 g Intravenous Once Joan Figueroa MD      clotrimazole   Topical BID FILI Ahumada      heparin (porcine)  5,000 Units Subcutaneous [facility] [place] [place]      ondansetron  4 mg Intravenous Q6H PRN Jayleen Parker DO      pantoprazole  40 mg Oral Early Morning Joan Figueroa MD      potassium chloride  20 mEq Intravenous Once Joan Figueroa MD          Today, Patient Was Seen By: Joan Figueroa MD    ** Please Note: Dictation voice to text software may have been used in the creation of this document   **

## 2022-04-11 VITALS
HEART RATE: 100 BPM | OXYGEN SATURATION: 99 % | WEIGHT: 118 LBS | DIASTOLIC BLOOD PRESSURE: 73 MMHG | BODY MASS INDEX: 18.96 KG/M2 | SYSTOLIC BLOOD PRESSURE: 137 MMHG | HEIGHT: 66 IN | TEMPERATURE: 98 F | RESPIRATION RATE: 16 BRPM

## 2022-04-11 LAB
ANION GAP SERPL CALCULATED.3IONS-SCNC: 8 MMOL/L (ref 4–13)
BUN SERPL-MCNC: 13 MG/DL (ref 5–25)
CALCIUM SERPL-MCNC: 7.9 MG/DL (ref 8.3–10.1)
CHLORIDE SERPL-SCNC: 106 MMOL/L (ref 100–108)
CO2 SERPL-SCNC: 26 MMOL/L (ref 21–32)
CREAT SERPL-MCNC: 0.99 MG/DL (ref 0.6–1.3)
GFR SERPL CREATININE-BSD FRML MDRD: 71 ML/MIN/1.73SQ M
GLUCOSE SERPL-MCNC: 90 MG/DL (ref 65–140)
POTASSIUM SERPL-SCNC: 3.4 MMOL/L (ref 3.5–5.3)
SODIUM SERPL-SCNC: 140 MMOL/L (ref 136–145)

## 2022-04-11 PROCEDURE — 99239 HOSP IP/OBS DSCHRG MGMT >30: CPT | Performed by: FAMILY MEDICINE

## 2022-04-11 PROCEDURE — 80048 BASIC METABOLIC PNL TOTAL CA: CPT | Performed by: FAMILY MEDICINE

## 2022-04-11 RX ORDER — ACETAMINOPHEN 325 MG/1
650 TABLET ORAL EVERY 8 HOURS PRN
Qty: 30 TABLET | Refills: 0 | Status: SHIPPED | OUTPATIENT
Start: 2022-04-11 | End: 2022-05-11

## 2022-04-11 RX ORDER — POTASSIUM CHLORIDE 750 MG/1
10 CAPSULE, EXTENDED RELEASE ORAL DAILY
Qty: 15 CAPSULE | Refills: 0 | Status: SHIPPED | OUTPATIENT
Start: 2022-04-11 | End: 2022-04-26

## 2022-04-11 RX ORDER — POTASSIUM CHLORIDE 20 MEQ/1
20 TABLET, EXTENDED RELEASE ORAL ONCE
Status: COMPLETED | OUTPATIENT
Start: 2022-04-11 | End: 2022-04-11

## 2022-04-11 RX ORDER — PANTOPRAZOLE SODIUM 40 MG/1
40 TABLET, DELAYED RELEASE ORAL
Qty: 30 TABLET | Refills: 0 | Status: SHIPPED | OUTPATIENT
Start: 2022-04-12 | End: 2022-05-12

## 2022-04-11 RX ORDER — LANOLIN ALCOHOL/MO/W.PET/CERES
1000 CREAM (GRAM) TOPICAL DAILY
Qty: 30 TABLET | Refills: 0 | Status: SHIPPED | OUTPATIENT
Start: 2022-04-11 | End: 2022-05-11

## 2022-04-11 RX ORDER — DOCUSATE SODIUM 100 MG/1
100 CAPSULE, LIQUID FILLED ORAL 2 TIMES DAILY
Qty: 60 CAPSULE | Refills: 0 | Status: SHIPPED | OUTPATIENT
Start: 2022-04-11 | End: 2022-05-11

## 2022-04-11 RX ADMIN — HEPARIN SODIUM 5000 UNITS: 5000 INJECTION INTRAVENOUS; SUBCUTANEOUS at 05:47

## 2022-04-11 RX ADMIN — CYANOCOBALAMIN 1000 MCG: 1000 INJECTION INTRAMUSCULAR; SUBCUTANEOUS at 08:46

## 2022-04-11 RX ADMIN — POTASSIUM CHLORIDE 20 MEQ: 20 TABLET, EXTENDED RELEASE ORAL at 10:30

## 2022-04-11 RX ADMIN — PANTOPRAZOLE SODIUM 40 MG: 40 TABLET, DELAYED RELEASE ORAL at 05:47

## 2022-04-11 RX ADMIN — CLOTRIMAZOLE: 10 CREAM TOPICAL at 08:45

## 2022-04-11 NOTE — ASSESSMENT & PLAN NOTE
· Obstructing mass distal to hepatic flexure  · POD#6 S/p ex lap, right hemicolectomy with anastomosis  · Surgical pathology pending

## 2022-04-11 NOTE — ASSESSMENT & PLAN NOTE
· Presents from home with nausea, dry heaves,hiccups, loss of appetite and 15 lb weight loss over the last month  Reports father with history of colon cancer diagnosed at age 81  · No prior colonoscopies or cancer history per patient and family  Denies medical/surgical history  Has not seen doctors or followed up with lab testing or screening test in years  · CT a/p w contrast: "Colonic mass involving the hepatic flexure where there is marked circumferential wall thickening and significant luminal narrowing with multiple fluid-filled dilated loops of since distal small bowel, and fluid distended proximal colon compatible with obstruction  No pneumatosis or free air  No definite metastatic disease  Haziness involving the omentum in the right upper quadrant adjacent to the mass which is nonspecific but is concerning for localized carcinomatosis "  · CT chest negative for malignancy  · POD6: hemicolectomy w/anatamosis  · tolerating soft diet  Had 2 bowel movements yesterday  Will discharge home on a soft bland diet with outpatient follow-up with surgery and Oncology    Pathology still pending at this

## 2022-04-11 NOTE — DISCHARGE INSTRUCTIONS
Please leave your dressing in place until follow-up visit in our outpatient office  You may shower, allowing water to run over the dressing as these dressings are allowed to get wet and should remain sealed  We recommend you do not pick at dressing, or lift it to inspect incision area as this can disrupt the seal and allow water in

## 2022-04-11 NOTE — NURSING NOTE
Discharge instructions and medications reviewed with patient and son  All questions answered  IV d/c'd  No written scripts

## 2022-04-11 NOTE — CASE MANAGEMENT
Case Management Discharge Planning Note    Patient name Francis Valle   Location /-01 MRN [id number]  : 1940 Date 2022       Current Admission Date: 2022  Current Admission Diagnosis:Small bowel obstruction [facility])   Patient Active Problem List    Diagnosis Date Noted    Tinea corporis 2022    Malnutrition ([facility] ) 2022    Severe protein-calorie malnutrition ([facility] ) 2022    Colonic mass     [facility] 2022    Acute on chronic anemia 2022    JENSEN (acute kidney injury) ([facility] ) 2022      LOS (days): 7  Geometric Mean LOS (GMLOS) (days): 10 30  Days to GMLOS:3 7     OBJECTIVE:  Risk of Unplanned Readmission Score: 14         Current admission status: Inpatient   Preferred Pharmacy:   [facility], [address]  [address] [place] [id number]  Phone: [phone] Fax: [phone]    Primary Care Provider: Snow Jacobs DO    Primary Insurance: MEDICARE  Secondary Insurance:     DISCHARGE DETAILS:         Patient discussed in huddle stable for discharge  Family transporting patient home  CM spoke with patient daughter  Bipin Levy and confirmed address for home health visits    CM updated  [address] [facility] [address] with daughter [address]  [address]  [phone]  CM faxed  [facility] home health AVS/DC summary pt notes and sx note via epic

## 2022-04-11 NOTE — ASSESSMENT & PLAN NOTE
Lab Results   Component Value Date    CREATININE 0 99 04/11/2022   Creatinine 1 39 on admission  Improved to 1 02 with IV fluid hydration  · Unclear baseline, no prior labs available in care everywhere     · Suspect prerenal secondary to dehyrdration given poor PO intake  · Patient received contrast for CT a/p, d/c IV fluids w/advanced diet  · Trend BMP  · Avoid nephrotoxins, hypotension   · RESOLVED

## 2022-04-11 NOTE — ASSESSMENT & PLAN NOTE
· Noted bilateral lower extremity circular rash erythematous border with central clearing, covering bilateral shins, lower extremities  Patient states it has been there for a while and noted following being in the woods  He states that it is itchy at times  · Rash appears to be ringworm given the appearance, and spread isolated to lower extremities  · Miconazole cream b i d   To affected area- changed to clotrimazole 1% BID [id number]

## 2022-04-11 NOTE — DISCHARGE SUMMARY
[address]  Discharge- Juan Carlos Madrigal Sr  1940, 81 y o  male MRN: [id number]  Unit/Bed#: -01 Encounter: [id number]  Primary Care Provider: Kaelyn Richardson DO   Date and time admitted to hospital: 4/4/2022  7:28 PM    * Small bowel obstruction [facility])  Assessment & Plan  · Presents from home with nausea, dry heaves,hiccups, loss of appetite and 15 lb weight loss over the last month  Reports father with history of colon cancer diagnosed at age 81  · No prior colonoscopies or cancer history per patient and family  Denies medical/surgical history  Has not seen doctors or followed up with lab testing or screening test in years  · CT a/p w contrast: "Colonic mass involving the hepatic flexure where there is marked circumferential wall thickening and significant luminal narrowing with multiple fluid-filled dilated loops of since distal small bowel, and fluid distended proximal colon compatible with obstruction  No pneumatosis or free air  No definite metastatic disease  Haziness involving the omentum in the right upper quadrant adjacent to the mass which is nonspecific but is concerning for localized carcinomatosis "  · CT chest negative for malignancy  · POD6: hemicolectomy w/anatamosis  · tolerating soft diet  Had 2 bowel movements yesterday  Will discharge home on a soft bland diet with outpatient follow-up with surgery and Oncology  Pathology still pending at this    Tinea corporis  Assessment & Plan  · Noted bilateral lower extremity circular rash erythematous border with central clearing, covering bilateral shins, lower extremities  Patient states it has been there for a while and noted following being in the woods  He states that it is itchy at times  · Rash appears to be ringworm given the appearance, and spread isolated to lower extremities  · Miconazole cream b i d   To affected area- changed to clotrimazole 1% BID [id number]    Colonic mass  Assessment & Plan  · Obstructing mass distal to hepatic flexure  · POD#6 S/p ex lap, right hemicolectomy with anastomosis  · Surgical pathology pending    Severe protein-calorie malnutrition ([address] )  Assessment & Plan  Malnutrition Findings:   Adult Malnutrition type: Chronic illness  Adult Degree of Malnutrition: Other severe protein calorie malnutrition  Malnutrition Characteristics: Muscle loss,Fat loss,Inadequate energy  ·     · Reports 15 lb weight loss over the last 1-2 months with poor PO intake  Cachectic appearing  · Consult placed to nutrition for further recommendations            secondary to colonic malignancy  Currently NPO due to small-bowel obstruction  Continue IV fluid hydration      360 Statement: Chronic severe malnutrition related to decreased appetite, N/V, dry heaves, colonic mass as evidenced by < 75% estimated energy intake > 1 mo, Severe muscle loss to pectoralis, deltoid, gastrocnemius and quadriceps muscles + scapula region, severe fat loss to buccal pads  Treated with: Pt currently NPO for possible surgery pending family discussion  Once appropriate for oral diet, would recommend regular diet with ensure enlive TID  Consider appetite stimulant as medically appropriate  Recommend daily weights for nutrition monitoring  BMI Findings: Body mass index is 19 05 kg/m²  JENSEN (acute kidney injury) [facility])  Assessment & Plan  Lab Results   Component Value Date    CREATININE 0 99 04/11/2022   Creatinine 1 39 on admission  Improved to 1 02 with IV fluid hydration  · Unclear baseline, no prior labs available in care everywhere  · Suspect prerenal secondary to dehyrdration given poor PO intake  · Patient received contrast for CT a/p, d/c IV fluids w/advanced diet  · Trend BMP  · Avoid nephrotoxins, hypotension   · RESOLVED      Acute on chronic anemia  Assessment & Plan  · Hgb 11 2 on admission   · Hemoglobin dropped to 10 3 with hydration  Also due to underlying colonic malignancy  Currently hemoglobin stable around 8 8 due to recent surgery blood loss as expected  no need to check any further  · Denies melena, hematochezia  · normal folate, low B12 and iron panel,       Discharging Physician / Practitioner: Joan Figueroa MD  PCP: eZina Lopez DO  Admission Date:   Admission Orders (From admission, onward)     Ordered        04/04/22 2309  Inpatient Admission  Once                      Discharge Date: 04/11/22    Medical Problems             Resolved Problems  Date Reviewed: 4/11/2022    None                Consultations During Hospital Stay:  · General surgery    Procedures Performed:   · status post exploratory laparotomy, right hemicolectomy with anastomosis    Significant Findings / Test Results:   CT chest wo contrast    Result Date: 4/5/2022  Impression: No evidence of metastatic disease throughout the chest   No evidence of esophageal mass in this unenhanced study  Incomplete assessment of hepatic flexure mass  Refer to yesterday's CT abdomen and pelvis  Workstation performed: [id number]     CT abdomen pelvis with contrast    Result Date: 4/4/2022  Impression: 1  Colonic mass involving the hepatic flexure where there is marked circumferential wall thickening and significant luminal narrowing, with multiple fluid-filled dilated loops of distal small bowel, and fluid distended proximal colon compatible with obstruction  No pneumatosis or free air  No definite metastatic disease  2   Haziness involving the omentum in the right upper quadrant adjacent to the mass, which is nonspecific but is concerning for localized carcinomatosis The study was marked in EPIC for immediate notification  Workstation performed: [id number]     Incidental Findings:   · none    Test Results Pending at Discharge (will require follow up):    · Pathology of surgical sample     Outpatient Tests Requested:  · Outpatient follow-up with PCP, surgery and Oncology    Complications:  None    Reason for Admission:  Abdominal pain    Hospital Course: Jack Coto is a 81 y o  male patient who originally presented to the hospital on 4/4/2022 due to abdominal pain nausea vomiting and found to have small-bowel obstruction secondary to colonic mass  Patient underwent exploratory laparotomy, right hemicolectomy with anastomosis  Postop patient did well  Gradually advance his diet which she is tolerating well and having bowel movements  Recommended home with home health and arrange for him to go live with his daughter for some time  Outpatient follow-up with surgery and Oncology recommended and also PCP established    Please see above list of diagnoses and related plan for additional information  Condition at Discharge: good     Discharge Day Visit / Exam:     Subjective:  Patient denies any chest pain or shortness of breath or abdominal pain today  Had 2 bowel movements yesterday  No nausea  Vitals: Blood Pressure: 137/73 (04/11/22 0754)  Pulse: 100 (04/11/22 0754)  Temperature: 98 °F (36 7 °C) (04/11/22 0754)  Temp Source: Oral (04/08/22 2201)  Respirations: 16 (04/11/22 0754)  Height: 5' 6" (167 6 cm) (04/05/22 1506)  Weight - Scale: 53 5 kg (118 lb) (04/05/22 1506)  SpO2: 99 % (04/11/22 0754)  Exam:   Physical Exam  Vitals and nursing note reviewed  Constitutional:       Appearance: Normal appearance  HENT:      Head: Normocephalic and atraumatic  Right Ear: External ear normal       Left Ear: External ear normal       Nose: Nose normal       Mouth/Throat:      Pharynx: Oropharynx is clear  Eyes:      Pupils: Pupils are equal, round, and reactive to light  Cardiovascular:      Rate and Rhythm: Normal rate and regular rhythm  Heart sounds: Normal heart sounds  Pulmonary:      Effort: Pulmonary effort is normal       Breath sounds: Normal breath sounds  Abdominal:      Palpations: Abdomen is soft  Tenderness: There is no abdominal tenderness        Comments: incision site with dressing  Hypoactive bowel sounds  No distention or tenderness   Musculoskeletal:         General: Normal range of motion  Cervical back: Normal range of motion and neck supple  Skin:     General: Skin is warm and dry  Capillary Refill: Capillary refill takes less than 2 seconds  Neurological:      General: No focal deficit present  Mental Status: He is alert and oriented to person, place, and time  Psychiatric:         Mood and Affect: Mood normal          Discussion with Family:  Discussed with daughter    Discharge instructions/Information to patient and family:   See after visit summary for information provided to patient and family  Provisions for Follow-Up Care:  See after visit summary for information related to follow-up care and any pertinent home health orders  Disposition:     Home with VNA Services (Reminder: Complete face to face encounter)    For Discharges to [facility] SN[facility]:   · Not Applicable to this Patient - Not Applicable to this Patient    Planned Readmission: none     Discharge Statement:  I spent 35 minutes discharging the patient  This time was spent on the day of discharge  I had direct contact with the patient on the day of discharge  Greater than 50% of the total time was spent examining patient, answering all patient questions, arranging and discussing plan of care with patient as well as directly providing post-discharge instructions  Additional time then spent on discharge activities  Discharge Medications:  See after visit summary for reconciled discharge medications provided to patient and family        ** Please Note: This note has been constructed using a voice recognition system **

## 2022-04-11 NOTE — PLAN OF CARE
Problem: MOBILITY - ADULT  Goal: Maintain or return to baseline ADL function  Description: INTERVENTIONS:  -  Assess patient's ability to carry out ADLs; assess patient's baseline for ADL function and identify physical deficits which impact ability to perform ADLs (bathing, care of mouth/teeth, toileting, grooming, dressing, etc )  - Assess/evaluate cause of self-care deficits   - Assess range of motion  - Assess patient's mobility; develop plan if impaired  - Assess patient's need for assistive devices and provide as appropriate  - Encourage maximum independence but intervene and supervise when necessary  - Involve family in performance of ADLs  - Assess for home care needs following discharge   - Consider OT consult to assist with ADL evaluation and planning for discharge  - Provide patient education as appropriate  4/11/2022 1058 by Roman Padgett RN  Outcome: Completed  4/11/2022 1058 by Roman Padgett RN  Outcome: Progressing  Goal: Maintains/Returns to pre admission functional level  Description: INTERVENTIONS:  - Perform BMAT or MOVE assessment daily    - Set and communicate daily mobility goal to care team and patient/family/caregiver     - Collaborate with rehabilitation services on mobility goals if consulted  - Out of bed for toileting  - Record patient progress and toleration of activity level   Outcome: Completed     Problem: Potential for Falls  Goal: Patient will remain free of falls  Description: INTERVENTIONS:  - Educate patient/family on patient safety including physical limitations  - Instruct patient to call for assistance with activity   - Consult OT/PT to assist with strengthening/mobility   - Keep Call bell within reach  - Keep bed low and locked with side rails adjusted as appropriate  - Keep care items and personal belongings within reach  - Initiate and maintain comfort rounds  - Make Fall Risk Sign visible to staff  - Apply yellow socks and bracelet for high fall risk patients  - Consider moving patient to room near nurses station  Outcome: Completed     Problem: Prexisting or High Potential for Compromised Skin Integrity  Goal: Skin integrity is maintained or improved  Description: INTERVENTIONS:  - Identify patients at risk for skin breakdown  - Assess and monitor skin integrity  - Assess and monitor nutrition and hydration status  - Monitor labs   - Assess for incontinence   - Turn and reposition patient  - Assist with mobility/ambulation  - Relieve pressure over bony prominences  - Avoid friction and shearing  - Provide appropriate hygiene as needed including keeping skin clean and dry  - Evaluate need for skin moisturizer/barrier cream  - Collaborate with interdisciplinary team   - Patient/family teaching  - Consider wound care consult   Outcome: Completed     Problem: Nutrition/Hydration-ADULT  Goal: Nutrient/Hydration intake appropriate for improving, restoring or maintaining nutritional needs  Description: Monitor and assess patient's nutrition/hydration status for malnutrition  Collaborate with interdisciplinary team and initiate plan and interventions as ordered  Monitor patient's weight and dietary intake as ordered or per policy  Utilize nutrition screening tool and intervene as necessary  Determine patient's food preferences and provide high-protein, high-caloric foods as appropriate       INTERVENTIONS:  - Monitor oral intake, urinary output, labs, and treatment plans  - Assess nutrition and hydration status and recommend course of action  - Evaluate amount of meals eaten  - Assist patient with eating if necessary   - Allow adequate time for meals  - Recommend/ encourage appropriate diets, oral nutritional supplements, and vitamin/mineral supplements  - Order, calculate, and assess calorie counts as needed  - Recommend, monitor, and adjust tube feedings and TPN/PPN based on assessed needs  - Assess need for intravenous fluids  - Provide specific nutrition/hydration education as appropriate  - Include patient/family/caregiver in decisions related to nutrition  Outcome: Completed     Problem: PAIN - ADULT  Goal: Verbalizes/displays adequate comfort level or baseline comfort level  Description: Interventions:  - Encourage patient to monitor pain and request assistance  - Assess pain using appropriate pain scale  - Administer analgesics based on type and severity of pain and evaluate response  - Implement non-pharmacological measures as appropriate and evaluate response  - Consider cultural and social influences on pain and pain management  - Notify physician/advanced practitioner if interventions unsuccessful or patient reports new pain  Outcome: Completed     Problem: GASTROINTESTINAL - ADULT  Goal: Minimal or absence of nausea and/or vomiting  Description: INTERVENTIONS:  - Administer IV fluids if ordered to ensure adequate hydration  - Maintain NPO status until nausea and vomiting are resolved  - Nasogastric tube if ordered  - Administer ordered antiemetic medications as needed  - Provide nonpharmacologic comfort measures as appropriate  - Advance diet as tolerated, if ordered  - Consider nutrition services referral to assist patient with adequate nutrition and appropriate food choices  Outcome: Completed  Goal: Maintains or returns to baseline bowel function  Description: INTERVENTIONS:  - Assess bowel function  - Encourage oral fluids to ensure adequate hydration  - Administer IV fluids if ordered to ensure adequate hydration  - Administer ordered medications as needed  - Encourage mobilization and activity  - Consider nutritional services referral to assist patient with adequate nutrition and appropriate food choices  Outcome: Completed  Goal: Maintains adequate nutritional intake  Description: INTERVENTIONS:  - Monitor percentage of each meal consumed  - Identify factors contributing to decreased intake, treat as appropriate  - Assist with meals as needed  - Monitor I&O, weight, and lab values if indicated  - Obtain nutrition services referral as needed  Outcome: Completed  Goal: Oral mucous membranes remain intact  Description: INTERVENTIONS  - Assess oral mucosa and hygiene practices  - Implement preventative oral hygiene regimen  - Implement oral medicated treatments as ordered  - Initiate Nutrition services referral as needed  Outcome: Completed

## 2022-04-11 NOTE — PROGRESS NOTES
Progress Note - General Surgery   Juan Carlos Madrigal Sr  81 y o  male MRN: [id number]  Unit/Bed#: -01 Encounter: [id number]    Assessment:  - POD#6 s/p exploratory laparotomy with right hemicolectomy  - Denies abdominal pain  - 2 BMs yesterday and passing flatus  - Tolerating full liquids with toast and crackers  - VSS and WNL  - Hypokalemnia 3 4 (3 2, 3 3)     Plan:  - Advance to surgical soft  - Dulcolax suppository  - Potassium repletion  - Pain/nausea control prn  - Ambulate OOB ad katrin  - PT/OT  - Medical management as per hospitalist    Subjective/Objective     Subjective: No acute events overnight  Pt reports he had 2 small BMs yesterday  He is passing flatus  Tolerating full liquid diet well  Denies abdominal pain, N/V/D/C, bloating, fever, chills, CP, or SOB  Objective:    Blood pressure 140/73, pulse 83, temperature 98 °F (36 7 °C), resp  rate 18, height 5' 6" (1 676 m), weight 53 5 kg (118 lb), SpO2 99 %  ,Body mass index is 19 05 kg/m²  Intake/Output Summary (Last 24 hours) at 4/11/2022 0734  Last data filed at 4/11/2022 0553  Gross per 24 hour   Intake 480 ml   Output 975 ml   Net -495 ml       Invasive Devices  Report    Peripheral Intravenous Line            Peripheral IV 04/08/22 Distal;Dorsal (posterior); Right Forearm 2 days                Physical Exam: /73   Pulse 83   Temp 98 °F (36 7 °C)   Resp 18   Ht 5' 6" (1 676 m)   Wt 53 5 kg (118 lb)   SpO2 98%   BMI 19 05 kg/m²   General appearance: alert and oriented, in no acute distress  Lungs: clear to auscultation bilaterally  Heart: regular rate and rhythm, S1, S2 normal, no murmur, click, rub or gallop  Abdomen: soft, non-tender; bowel sounds normal; no masses,  no organomegaly and Mepilex dressing in place    Lab, Imaging and other studies:  I have personally reviewed pertinent lab results      CMP:   Lab Results   Component Value Date    SODIUM 140 04/11/2022    K 3 4 (L) 04/11/2022     04/11/2022    CO2 26 04/11/2022    BUN 13 04/11/2022    CREATININE 0 99 04/11/2022    CALCIUM 7 9 (L) 04/11/2022    EGFR 71 04/11/2022     VTE Pharmacologic Prophylaxis: Heparin  VTE Mechanical Prophylaxis: sequential compression device     Al Freitas PA-C

## 2022-04-11 NOTE — DISCHARGE INSTR - AVS FIRST PAGE
Please place on a soft bland diet at home for the next 2 weeks  Patient is allowed to have soups, broth, rice, yogurt, well cooked chicken turkey and fish and vegetables  No deep fried spicy foods

## 2022-04-11 NOTE — PROGRESS NOTES
Pt reports is tolerating meals well and eats the food so long as he likes the taste, pt eating 100% of meals per nursing flowsheets  Was drinking at least 50% of the ensure clear, now that diet advanced to surgical soft will adjust to ensure enlive vanilla BID to increase kcal/PRO intake  Provided diet ed to pt regarding high kcal/high PRO foods to be eating at home, discussed softer texture foods in particular with addition of ensure complete or ensure enlive BID once d/c  Provided with coupons and handout  Also discussed the above with daughter Yessenia Carias over the phone

## 2022-04-12 LAB
DME PARACHUTE DELIVERY DATE ACTUAL: NORMAL
DME PARACHUTE DELIVERY DATE REQUESTED: NORMAL
DME PARACHUTE DELIVERY NOTE: NORMAL
DME PARACHUTE ITEM DESCRIPTION: NORMAL
DME PARACHUTE ORDER STATUS: NORMAL
DME PARACHUTE SUPPLIER NAME: NORMAL
DME PARACHUTE SUPPLIER PHONE: NORMAL

## 2022-11-14 DIAGNOSIS — Z85.038 PERSONAL HISTORY OF OTHER MALIGNANT NEOPLASM OF LARGE INTESTINE: ICD-10-CM

## 2023-02-15 ENCOUNTER — HOSPITAL ENCOUNTER (OUTPATIENT)
Dept: CT IMAGING | Facility: HOSPITAL | Age: 83
Discharge: HOME | End: 2023-02-15

## 2023-02-15 DIAGNOSIS — Z85.038 PERSONAL HISTORY OF OTHER MALIGNANT NEOPLASM OF LARGE INTESTINE: ICD-10-CM

## 2023-02-15 RX ADMIN — IOHEXOL 100 ML: 350 INJECTION, SOLUTION INTRAVENOUS at 09:06

## 2023-08-23 ENCOUNTER — HOSPITAL ENCOUNTER (OUTPATIENT)
Dept: CT IMAGING | Facility: HOSPITAL | Age: 83
Discharge: HOME | End: 2023-08-23
Payer: COMMERCIAL

## 2023-08-23 DIAGNOSIS — Z85.038 PERSONAL HISTORY OF OTHER MALIGNANT NEOPLASM OF LARGE INTESTINE: ICD-10-CM

## 2023-08-23 PROCEDURE — 71250 CT THORAX DX C-: CPT

## 2023-08-23 PROCEDURE — 74176 CT ABD & PELVIS W/O CONTRAST: CPT

## 2024-03-06 ENCOUNTER — HOSPITAL ENCOUNTER (OUTPATIENT)
Dept: CT IMAGING | Facility: HOSPITAL | Age: 84
Discharge: HOME | End: 2024-03-06
Payer: COMMERCIAL

## 2024-03-06 DIAGNOSIS — Z85.038 PERSONAL HISTORY OF COLON CANCER: ICD-10-CM

## 2024-03-06 PROCEDURE — G1004 CDSM NDSC: HCPCS

## 2024-03-06 PROCEDURE — 74176 CT ABD & PELVIS W/O CONTRAST: CPT

## 2024-03-06 PROCEDURE — 71250 CT THORAX DX C-: CPT

## 2025-04-16 ENCOUNTER — HOSPITAL ENCOUNTER (OUTPATIENT)
Dept: CT IMAGING | Facility: HOSPITAL | Age: 85
Discharge: HOME | End: 2025-04-16
Payer: COMMERCIAL

## 2025-04-16 DIAGNOSIS — Z85.038 PERSONAL HISTORY OF OTHER MALIGNANT NEOPLASM OF LARGE INTESTINE: ICD-10-CM

## 2025-04-16 PROCEDURE — 74176 CT ABD & PELVIS W/O CONTRAST: CPT

## 2025-04-16 PROCEDURE — 71250 CT THORAX DX C-: CPT

## 2025-06-01 ENCOUNTER — HOSPITAL ENCOUNTER (EMERGENCY)
Facility: HOSPITAL | Age: 85
Discharge: HOME | End: 2025-06-01
Attending: EMERGENCY MEDICINE
Payer: COMMERCIAL

## 2025-06-01 ENCOUNTER — APPOINTMENT (EMERGENCY)
Dept: RADIOLOGY | Facility: HOSPITAL | Age: 85
End: 2025-06-01
Payer: COMMERCIAL

## 2025-06-01 ENCOUNTER — APPOINTMENT (EMERGENCY)
Dept: CT IMAGING | Facility: HOSPITAL | Age: 85
End: 2025-06-01
Payer: COMMERCIAL

## 2025-06-01 VITALS
TEMPERATURE: 97.9 F | RESPIRATION RATE: 18 BRPM | BODY MASS INDEX: 19.8 KG/M2 | HEART RATE: 94 BPM | SYSTOLIC BLOOD PRESSURE: 123 MMHG | WEIGHT: 118.83 LBS | DIASTOLIC BLOOD PRESSURE: 68 MMHG | OXYGEN SATURATION: 98 % | HEIGHT: 65 IN

## 2025-06-01 DIAGNOSIS — S60.221A CONTUSION OF RIGHT HAND, INITIAL ENCOUNTER: ICD-10-CM

## 2025-06-01 DIAGNOSIS — T07.XXXA MULTIPLE LACERATIONS: ICD-10-CM

## 2025-06-01 DIAGNOSIS — S50.02XA CONTUSION OF LEFT ELBOW, INITIAL ENCOUNTER: ICD-10-CM

## 2025-06-01 DIAGNOSIS — S50.01XA CONTUSION OF RIGHT ELBOW, INITIAL ENCOUNTER: ICD-10-CM

## 2025-06-01 DIAGNOSIS — S09.90XA INJURY OF HEAD, INITIAL ENCOUNTER: ICD-10-CM
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
## 2025-06-01 DIAGNOSIS — S62.647A CLOSED NONDISPLACED FRACTURE OF PROXIMAL PHALANX OF LEFT LITTLE FINGER, INITIAL ENCOUNTER: ICD-10-CM

## 2025-06-01 LAB
ALBUMIN SERPL BCG-MCNC: 4 G/DL (ref 3.5–5)
ALP SERPL-CCNC: 77 U/L (ref 34–104)
ALT SERPL W P-5'-P-CCNC: 21 U/L (ref 7–52)
ANION GAP SERPL CALCULATED.3IONS-SCNC: 9 MMOL/L (ref 4–13)
APTT PPP: 26 SECONDS (ref 23–34)
AST SERPL W P-5'-P-CCNC: 30 U/L (ref 13–39)
BASOPHILS # BLD AUTO: 0.04 THOUSANDS/ÂΜL (ref 0–0.1)
BASOPHILS NFR BLD AUTO: 1 % (ref 0–1)
BILIRUB SERPL-MCNC: 0.66 MG/DL (ref 0.2–1)
BUN SERPL-MCNC: 37 MG/DL (ref 5–25)
CALCIUM SERPL-MCNC: 9.3 MG/DL (ref 8.4–10.2)
CHLORIDE SERPL-SCNC: 106 MMOL/L (ref 96–108)
CO2 SERPL-SCNC: 23 MMOL/L (ref 21–32)
CREAT SERPL-MCNC: 1.6 MG/DL (ref 0.6–1.3)
EOSINOPHIL # BLD AUTO: 0.1 THOUSAND/ÂΜL (ref 0–0.61)
EOSINOPHIL NFR BLD AUTO: 1 % (ref 0–6)
ERYTHROCYTE [DISTWIDTH] IN BLOOD BY AUTOMATED COUNT: 13.2 % (ref 11.6–15.1)
GFR SERPL CREATININE-BSD FRML MDRD: 38 ML/MIN/1.73SQ M
GLUCOSE SERPL-MCNC: 93 MG/DL (ref 65–140)
HCT VFR BLD AUTO: 34.5 % (ref 36.5–49.3)
HGB BLD-MCNC: 11.2 G/DL (ref 12–17)
IMM GRANULOCYTES # BLD AUTO: 0.04 THOUSAND/UL (ref 0–0.2)
IMM GRANULOCYTES NFR BLD AUTO: 1 % (ref 0–2)
INR PPP: 1.03 (ref 0.85–1.19)
LYMPHOCYTES # BLD AUTO: 0.41 THOUSANDS/ÂΜL (ref 0.6–4.47)
LYMPHOCYTES NFR BLD AUTO: 5 % (ref 14–44)
MCH RBC QN AUTO: 31.7 PG (ref 26.8–34.3)
MCHC RBC AUTO-ENTMCNC: 32.5 G/DL (ref 31.4–37.4)
MCV RBC AUTO: 98 FL (ref 82–98)
MONOCYTES # BLD AUTO: 0.89 THOUSAND/ÂΜL (ref 0.17–1.22)
MONOCYTES NFR BLD AUTO: 12 % (ref 4–12)
NEUTROPHILS # BLD AUTO: 6.18 THOUSANDS/ÂΜL (ref 1.85–7.62)
NEUTS SEG NFR BLD AUTO: 80 % (ref 43–75)
NRBC BLD AUTO-RTO: 0 /100 WBCS
PLATELET # BLD AUTO: 159 THOUSANDS/UL (ref 149–390)
PMV BLD AUTO: 9.2 FL (ref 8.9–12.7)
POTASSIUM SERPL-SCNC: 4.3 MMOL/L (ref 3.5–5.3)
PROT SERPL-MCNC: 7.5 G/DL (ref 6.4–8.4)
PROTHROMBIN TIME: 13.9 SECONDS (ref 12.3–15)
RBC # BLD AUTO: 3.53 MILLION/UL (ref 3.88–5.62)
SODIUM SERPL-SCNC: 138 MMOL/L (ref 135–147)
WBC # BLD AUTO: 7.66 THOUSAND/UL (ref 4.31–10.16)

## 2025-06-01 PROCEDURE — 99284 EMERGENCY DEPT VISIT MOD MDM: CPT

## 2025-06-01 PROCEDURE — 70450 CT HEAD/BRAIN W/O DYE: CPT

## 2025-06-01 PROCEDURE — 74176 CT ABD & PELVIS W/O CONTRAST: CPT

## 2025-06-01 PROCEDURE — 85610 PROTHROMBIN TIME: CPT | Performed by: EMERGENCY MEDICINE

## 2025-06-01 PROCEDURE — 85730 THROMBOPLASTIN TIME PARTIAL: CPT | Performed by: EMERGENCY MEDICINE

## 2025-06-01 PROCEDURE — 85025 COMPLETE CBC W/AUTO DIFF WBC: CPT | Performed by: EMERGENCY MEDICINE

## 2025-06-01 PROCEDURE — 73130 X-RAY EXAM OF HAND: CPT

## 2025-06-01 PROCEDURE — 72125 CT NECK SPINE W/O DYE: CPT

## 2025-06-01 PROCEDURE — 71250 CT THORAX DX C-: CPT

## 2025-06-01 PROCEDURE — 80053 COMPREHEN METABOLIC PANEL: CPT | Performed by: EMERGENCY MEDICINE

## 2025-06-01 PROCEDURE — 99285 EMERGENCY DEPT VISIT HI MDM: CPT | Performed by: EMERGENCY MEDICINE

## 2025-06-01 PROCEDURE — 36415 COLL VENOUS BLD VENIPUNCTURE: CPT | Performed by: EMERGENCY MEDICINE

## 2025-06-01 PROCEDURE — 73080 X-RAY EXAM OF ELBOW: CPT

## 2025-06-01 NOTE — ED PROVIDER NOTES
ED Disposition       None          Assessment & Plan   {Hyperlinks  Risk Stratification - NIHSS - HEART SCORE - Fill out sepsis note and make sure you call [phone] if severe or septic shock:[id number]}    Medical Decision Making           Medications - No data to display    ED Risk Strat Scores                    No data recorded                            History of Present Illness   {Hyperlinks  History (Med, Surg, Fam, Social) - Current Medications - Allergies  :[id number]}    Chief Complaint   Patient presents with    Fall     Pt fell down steps this morning. Pt had no Loc. +headstrike. Denies taking any medications or thinners.        Past Medical History[1]   Past Surgical History[2]   Family History[3]   Social History[4]   E-Cigarette/Vaping      E-Cigarette/Vaping Substances      I have reviewed and agree with the history as documented.     Fell down poss as many as 15 wooden steps around 0030; suspect lost footing/balance. No bt's; son heard it happen. Head lac, both elbows; right hand pain, left little finger.        Review of Systems        Objective   {Hyperlinks  Historical Vitals - Historical Labs - Chart Review/Microbiology - Last Echo - Code Status  :[id number]}    ED Triage Vitals   Temperature Pulse Blood Pressure Respirations SpO2 Patient Position - Orthostatic VS   06/01/25 0139 06/01/25 0139 06/01/25 0140 06/01/25 0139 06/01/25 0139 06/01/25 0139   97.9 °F (36.6 °C) 104 145/93 18 99 % Lying      Temp Source Heart Rate Source BP Location FiO2 (%) Pain Score    06/01/25 0139 06/01/25 0139 06/01/25 0139 -- 06/01/25 0139    Temporal Monitor Right arm  5      Vitals      Date and Time Temp Pulse SpO2 Resp BP Pain Score FACES Pain Rating User   06/01/25 0145 -- 90 98 % -- 146/78 -- -- JM   06/01/25 0140 -- -- -- -- 145/93 -- -- BA   06/01/25 0139 97.9 °F (36.6 °C) 104 99 % 18 -- 5 --             Physical Exam    Results Reviewed       None            No orders to display       Procedures    ED  Medication and Procedure Management   Prior to Admission Medications   Prescriptions Last Dose Informant Patient Reported? Taking?   docusate sodium (COLACE) 100 mg capsule   No No   Sig: Take 1 capsule (100 mg total) by mouth 2 (two) times a day   pantoprazole (PROTONIX) 40 mg tablet   No No   Sig: Take 1 tablet (40 mg total) by mouth daily in the early morning   potassium chloride (MICRO-K) 10 MEQ CR capsule   No No   Sig: Take 1 capsule (10 mEq total) by mouth daily for 15 days   vitamin B-12 (VITAMIN B-12) 1,000 mcg tablet   No No   Sig: Take 1 tablet (1,000 mcg total) by mouth daily      Facility-Administered Medications: None     Patient's Medications   Discharge Prescriptions    No medications on file     No discharge procedures on file.  ED SEPSIS DOCUMENTATION                [1]   Past Medical History:  Diagnosis Date    GERD (gastroesophageal reflux disease)    [2]   Past Surgical History:  Procedure Laterality Date    EXPLORATORY LAPAROTOMY W/ BOWEL RESECTION N/A 4/5/2022    Procedure: LAPAROTOMY EXPLORATORY, RIGHT DWAYNE COLECTOMY WITH ANASTOMOSIS;  Surgeon: Amy Parker DO;  Location:  MAIN OR;  Service: General    HERNIA REPAIR     [3] No family history on file.  [4]   Social History  Tobacco Use    Smoking status: Never    Smokeless tobacco: Never   Substance Use Topics    Alcohol use: Not Currently    Drug use: Never      148/81 -- --    06/01/25 0145 -- 90 98 % -- 146/78 -- --    06/01/25 0140 -- -- -- -- 145/93 -- --    06/01/25 0139 97.9 °F (36.6 °C) 104 99 % 18 -- 5 --             Physical Exam    Results Reviewed       Procedure Component Value Units Date/Time    Protime-INR [[id number]]  (Normal) Collected: 06/01/25 0236    Lab Status: Final result Specimen: Blood from Arm, Left Updated: 06/01/25 0312     Protime 13.9 seconds      INR 1.03    Narrative:      INR Therapeutic Range    Indication                                             INR Range      Atrial Fibrillation                                               2.0-3.0  Hypercoagulable State                                    2.0.2.3  Left Ventricular Asist Device                            2.0-3.0  Mechanical Heart Valve                                  -    Aortic(with afib, MI, embolism, HF, LA enlargement,    and/or coagulopathy)                                     2.0-3.0 (2.5-3.5)     Mitral                                                             2.5-3.5  Prosthetic/Bioprosthetic Heart Valve               2.0-3.0  Venous thromboembolism (VTE: VT, PE        2.0-3.0    APTT [[id number]]  (Normal) Collected: 06/01/25 0236    Lab Status: Final result Specimen: Blood from Arm, Left Updated: 06/01/25 0312     PTT 26 seconds     Comprehensive metabolic panel [[id number]]  (Abnormal) Collected: 06/01/25 0236    Lab Status: Final result Specimen: Blood from Arm, Left Updated: 06/01/25 0300     Sodium 138 mmol/L      Potassium 4.3 mmol/L      Chloride 106 mmol/L      CO2 23 mmol/L      ANION GAP 9 mmol/L      BUN 37 mg/dL      Creatinine 1.60 mg/dL      Glucose 93 mg/dL      Calcium 9.3 mg/dL      AST 30 U/L      ALT 21 U/L      Alkaline Phosphatase 77 U/L      Total Protein 7.5 g/dL      Albumin 4.0 g/dL      Total Bilirubin 0.66 mg/dL      eGFR 38 ml/min/1.73sq m     Narrative:      [employer] guidelines for Chronic Kidney Disease (CKD):      Stage 1 with normal or high GFR (GFR > 90 mL/min/1.73 square meters)    Stage 2 Mild CKD (GFR = 60-89 mL/min/1.73 square meters)    Stage 3A Moderate CKD (GFR = 45-59 mL/min/1.73 square meters)    Stage 3B Moderate CKD (GFR = 30-44 mL/min/1.73 square meters)    Stage 4 Severe CKD (GFR = 15-29 mL/min/1.73 square meters)    Stage 5 End Stage CKD (GFR <15 mL/min/1.73 square meters)  Note: GFR calculation is accurate only with a steady state creatinine    CBC and differential [[id number]]  (Abnormal) Collected: 06/01/25 0236    Lab Status: Final result Specimen: Blood from Arm, Left Updated: 06/01/25 0243     WBC 7.66 Thousand/uL      RBC 3.53 Million/uL      Hemoglobin 11.2 g/dL      Hematocrit 34.5 %      MCV 98 fL      MCH 31.7 pg      MCHC 32.5 g/dL      RDW 13.2 %      MPV 9.2 fL      Platelets 159 Thousands/uL      nRBC 0 /100 WBCs      Segmented % 80 %      Immature Grans % 1 %      Lymphocytes % 5 %      Monocytes % 12 %      Eosinophils Relative 1 %      Basophils Relative 1 %      Absolute Neutrophils 6.18 Thousands/µL      Absolute Immature Grans 0.04 Thousand/uL      Absolute Lymphocytes 0.41 Thousands/µL      Absolute Monocytes 0.89 Thousand/µL      Eosinophils Absolute 0.10 Thousand/µL      Basophils Absolute 0.04 Thousands/µL             XR elbow 3+ views RIGHT   ED Interpretation by Abdi Harrison MD (06/01 0550)   No acute fracture or dislocation appreciated.  AI software reveals possible fracture, but upon review of imaging, no convincing fracture is visualized.      Final Interpretation by Nabor Westfall MD (06/01 0553)      1.  No acute osseous abnormality   2.  Possible small effusion         Computerized Assisted Algorithm (CAA) may have been used to analyze all applicable images.         Workstation performed: [id number]         XR hand 3+ views RIGHT   ED Interpretation by Abdi Harrison MD (06/01 0553)   No acute fracture or dislocation appreciated.      Final Interpretation by Leland  MD Telly (06/01 1955)      No acute osseous abnormality.         Computerized Assisted Algorithm (CAA) may have been used to analyze all applicable images.         Workstation performed: [id number]         XR elbow 3+ views LEFT   ED Interpretation by Abdi Harrison MD (06/01 0552)   No acute fracture or dislocation appreciated.      Final Interpretation by Leland Varner MD (06/01 1955)      No acute osseous abnormality.         Computerized Assisted Algorithm (CAA) may have been used to analyze all applicable images.         Workstation performed: [id number]         XR hand 3+ views LEFT   ED Interpretation by Abdi Harrison MD (06/01 0554)   Nondisplaced fracture at the base of the left fifth proximal phalanx.      Final Interpretation by Leland Varner MD (06/01 1956)      Acute nondisplaced fracture through the base of the left fifth proximal phalanx.         Computerized Assisted Algorithm (CAA) may have been used to analyze all applicable images.         Workstation performed: [id number]         CT head without contrast   Final Interpretation by Reggie Osuna MD (06/01 0419)      No intracranial hemorrhage or calvarial fracture. Chronic small vessel ischemic changes.                  Workstation performed: [id number]         CT cervical spine without contrast   Final Interpretation by Reggie Osuna MD (06/01 0419)      No cervical spine fracture or traumatic malalignment.                  Workstation performed: [id number]         CT chest abdomen pelvis wo contrast   Final Interpretation by Reggie Osuna MD (06/01 0418)      No acute traumatic injury identified within the chest, abdomen, or pelvis.   Cholelithiasis without evidence of cholecystitis.   Diverticulosis without evidence of diverticulitis         Workstation performed: [id number]             Procedures    ED Medication and Procedure Management   Prior to Admission Medications   Prescriptions Last Dose Informant Patient Reported? Taking?    docusate sodium (COLACE) 100 mg capsule   No No   Sig: Take 1 capsule (100 mg total) by mouth 2 (two) times a day   Patient not taking: Reported on 6/6/2025   pantoprazole (PROTONIX) 40 mg tablet   No No   Sig: Take 1 tablet (40 mg total) by mouth daily in the early morning   Patient not taking: Reported on 6/6/2025   potassium chloride (MICRO-K) 10 MEQ CR capsule   No No   Sig: Take 1 capsule (10 mEq total) by mouth daily for 15 days   Patient not taking: Reported on 6/6/2025   vitamin B-12 (VITAMIN B-12) 1,000 mcg tablet   No No   Sig: Take 1 tablet (1,000 mcg total) by mouth daily   Patient not taking: Reported on 6/6/2025      Facility-Administered Medications: None     Discharge Medication List as of 6/1/2025  6:19 AM        CONTINUE these medications which have NOT CHANGED    Details   docusate sodium (COLACE) 100 mg capsule Take 1 capsule (100 mg total) by mouth 2 (two) times a day, Starting Mon 4/11/2022, Until Wed 5/11/2022, Normal      pantoprazole (PROTONIX) 40 mg tablet Take 1 tablet (40 mg total) by mouth daily in the early morning, Starting Tue 4/12/2022, Until Thu 5/12/2022, Normal      potassium chloride (MICRO-K) 10 MEQ CR capsule Take 1 capsule (10 mEq total) by mouth daily for 15 days, Starting Mon 4/11/2022, Until Tue 4/26/2022, Normal      vitamin B-12 (VITAMIN B-12) 1,000 mcg tablet Take 1 tablet (1,000 mcg total) by mouth daily, Starting Mon 4/11/2022, Until Wed 5/11/2022, Normal             ED SEPSIS DOCUMENTATION   Time reflects when diagnosis was documented in both MDM as applicable and the Disposition within this note       Time User Action Codes Description Comment    6/1/2025  6:15 AM Abdi Harrison [W10.8XXA] Fall down steps, initial encounter     6/1/2025  6:15 AM Abdi Harrison [S09.90XA] Injury of head, initial encounter     6/1/2025  6:15 AM Abdi Harrison [S41.112A] Laceration of multiple sites of left upper extremity, initial encounter     6/1/2025  6:15 AM Hunter  Abdi Torres [S50.02XA] Contusion of left elbow, initial encounter     6/1/2025  6:16 AM Abdi Harrison [S50.01XA] Contusion of right elbow, initial encounter     6/1/2025  6:16 AM Abdi Harrison Remove [S41.112A] Laceration of multiple sites of left upper extremity, initial encounter     6/1/2025  6:16 AM Abdi Harrison [T07.XXXA] Multiple lacerations     6/1/2025  6:16 AM Abdi Harrison Modify [T07.XXXA] Multiple lacerations Occipital scalp, bilateral elbows, bilateral hands, all superficial.    6/1/2025  6:17 AM Abdi Harrison [S60.221A] Contusion of right hand, initial encounter     6/1/2025  6:18 AM Abdi Harrison [S62.647A] Closed nondisplaced fracture of proximal phalanx of left little finger, initial encounter                      [1]   Past Medical History:  Diagnosis Date    GERD (gastroesophageal reflux disease)    [2]   Past Surgical History:  Procedure Laterality Date    EXPLORATORY LAPAROTOMY W/ BOWEL RESECTION N/A 4/5/2022    Procedure: LAPAROTOMY EXPLORATORY, RIGHT DWAYNE COLECTOMY WITH ANASTOMOSIS;  Surgeon: Amy Parker DO;  Location:  MAIN OR;  Service: General    HERNIA REPAIR     [3] No family history on file.  [4]   Social History  Tobacco Use    Smoking status: Never    Smokeless tobacco: Never   Vaping Use    Vaping status: Never Used   Substance Use Topics    Alcohol use: Not Currently    Drug use: Never        Abdi Harrison MD  06/11/25 9589

## 2025-06-04 RX ORDER — FUROSEMIDE 20 MG/1
20 TABLET ORAL
COMMUNITY
Start: 2025-04-16

## 2025-06-06 ENCOUNTER — OFFICE VISIT (OUTPATIENT)
Dept: OBGYN CLINIC | Facility: CLINIC | Age: 85
End: 2025-06-06
Payer: COMMERCIAL

## 2025-06-06 VITALS — HEIGHT: 65 IN | WEIGHT: 118 LBS | BODY MASS INDEX: 19.66 KG/M2

## 2025-06-06 DIAGNOSIS — S62.617A CLOSED DISPLACED FRACTURE OF PROXIMAL PHALANX OF LEFT LITTLE FINGER, INITIAL ENCOUNTER: Primary | ICD-10-CM

## 2025-06-06 PROCEDURE — 99203 OFFICE O/P NEW LOW 30 MIN: CPT | Performed by: STUDENT IN AN ORGANIZED HEALTH CARE EDUCATION/TRAINING PROGRAM

## 2025-06-06 NOTE — PROGRESS NOTES
ASSESSMENT/PLAN:    Assessment & Plan  Closed displaced fracture of proximal phalanx of left little finger, initial encounter  The patient is an 85-year-old male with a minimally displaced intra-articular fracture at the base of the left small finger proximal phalanx.  I discussed the injury with the patient and his son today.  I discussed that typically for phalangeal fractures I recommend immobilization for 2 to 3 weeks followed by protected range of motion.  This is for fractures that are well aligned.  In the particular case of this patient the fracture does appear reasonably well aligned and he does have good range of motion with no evidence of scissoring or malrotation.  There is a small articular step-off but I think long-term given the patient's age and the pre-existing arthritis this will not make a difference.  I discussed in the use of the wrist brace with the patient and his son it is clear that the wrist brace would hinder the patient's ability to  his walker.  As such after discussing the treatment options we have elected to proceed with ney loop immobilization of the small finger to the ring finger.  This is with the recognition that there is a slightly higher risk for loss of the fracture alignment.  However the son did voice that if the fracture were to significantly displaced and be unlikely that they would want to proceed with surgery.  As such the patient will be nonweightbearing unless using his hand for the walker.  They will continue with ney loop immobilization.  Active range of motion as tolerated will be allowed.  The patient will follow-up in 6 weeks for new x-rays at the time.             _____________________________________________________  CHIEF COMPLAINT:  Left small finger pain      SUBJECTIVE:  Maximino Emily Polanco. is a 85 y.o. left-hand-dominant male who fell on 6/1/2025.  He was a fall down 15 stairs.  Aside from the small finger injury he did not sustain any other  "injuries in the fall.  The patient had pain and swelling about the finger and when he presented to the emergency department for left hand x-rays on 6/1/2025 he was found to have a fracture through the base of the left small finger.  He was placed in a splint but presents today without it.  Reportedly it had to be removed for stability with ambulation.  The patient uses a walker for ambulation and needs his hands to  it for safety.  He has not been complaining of any pain in the finger.    Notably in the past the patient did have a crush injury to this finger that has resulted in a fingernail deformity.    He lives with his son who presents today with the patient.      PAST MEDICAL HISTORY:  Past Medical History[1]    PAST SURGICAL HISTORY:  Past Surgical History[2]    FAMILY HISTORY:  Family History[3]    SOCIAL HISTORY:  Social History[4]    MEDICATIONS:  Current Medications[5]    ALLERGIES:  Allergies[6]    REVIEW OF SYSTEMS:  Pertinent items are noted in HPI.  A comprehensive review of systems was negative.    LABS:  HgA1c: No results found for: \"HGBA1C\"  BMP:   Lab Results   Component Value Date    CALCIUM 9.3 06/01/2025    K 4.3 06/01/2025    CO2 23 06/01/2025     06/01/2025    BUN 37 (H) 06/01/2025    CREATININE 1.60 (H) 06/01/2025         _____________________________________________________  PHYSICAL EXAMINATION:  Vital signs: Ht 5' 5\" (1.651 m)   Wt 53.5 kg (118 lb)   BMI 19.64 kg/m²   General: well developed and well nourished, alert, oriented times 3, and appears comfortable  Psychiatric: Normal  HEENT: Trachea Midline, No torticollis  Cardiovascular: No discernable arrhythmia  Pulmonary: No wheezing or stridor  Abdomen: No rebound or guarding  Extremities: No peripheral edema  Skin: No masses, erythema, lacerations, fluctation, ulcerations  Neurovascular: Sensation Intact to the Median, Ulnar, Radial Nerve, Motor Intact to the Median, Ulnar, Radial Nerve, and Pulses Intact    MUSCULOSKELETAL " EXAMINATION:  Left hand  There is somewhat global ecchymosis about the hand but it does seem to be focused around the small finger  There is a slight pincer deformity of the small finger fingernail.  There are some tenderness over the base of the small finger  There is no obvious malalignment.  There is no rotational deformity.  The patient is able to flex his fingers to make a two thirds fist without any evidence of scissoring.  With full extension of the finger there is no evidence of pseudo clawing  The FDP and extensor mechanism are intact in the small finger  Sensation is intact throughout the small finger  The remainder the hand is neurovascularly intact    _____________________________________________________  STUDIES REVIEWED:  I reviewed imaging in PACS from 6/1/2025 of the left hand x-rays which demonstrate an acute minimally displaced fractures to the base of the fifth finger proximal phalanx with intra-articular extension      PROCEDURES PERFORMED:  Procedures        [1]   Past Medical History:  Diagnosis Date    GERD (gastroesophageal reflux disease)    [2]   Past Surgical History:  Procedure Laterality Date    EXPLORATORY LAPAROTOMY W/ BOWEL RESECTION N/A 4/5/2022    Procedure: LAPAROTOMY EXPLORATORY, RIGHT DWAYNE COLECTOMY WITH ANASTOMOSIS;  Surgeon: Amy Parker DO;  Location:  MAIN OR;  Service: General    HERNIA REPAIR     [3] No family history on file.  [4]   Social History  Tobacco Use    Smoking status: Never    Smokeless tobacco: Never   Vaping Use    Vaping status: Never Used   Substance Use Topics    Alcohol use: Not Currently    Drug use: Never   [5]   Current Outpatient Medications:     docusate sodium (COLACE) 100 mg capsule, Take 1 capsule (100 mg total) by mouth 2 (two) times a day (Patient not taking: Reported on 6/6/2025), Disp: 60 capsule, Rfl: 0    furosemide (LASIX) 20 mg tablet, Take 20 mg by mouth (Patient not taking: Reported on 6/6/2025), Disp: , Rfl:      pantoprazole (PROTONIX) 40 mg tablet, Take 1 tablet (40 mg total) by mouth daily in the early morning (Patient not taking: Reported on 6/6/2025), Disp: 30 tablet, Rfl: 0    potassium chloride (MICRO-K) 10 MEQ CR capsule, Take 1 capsule (10 mEq total) by mouth daily for 15 days (Patient not taking: Reported on 6/6/2025), Disp: 15 capsule, Rfl: 0    vitamin B-12 (VITAMIN B-12) 1,000 mcg tablet, Take 1 tablet (1,000 mcg total) by mouth daily (Patient not taking: Reported on 6/6/2025), Disp: 30 tablet, Rfl: 0  [6] No Known Allergies

## 2025-06-16 ENCOUNTER — HOSPITAL ENCOUNTER (INPATIENT)
Facility: HOSPITAL | Age: 85
LOS: 3 days | Discharge: NURSING, REHAB OR LONG-TERM CARE | DRG: 064 | End: 2025-06-20
Attending: EMERGENCY MEDICINE | Admitting: INTERNAL MEDICINE
Payer: COMMERCIAL

## 2025-06-16 ENCOUNTER — APPOINTMENT (EMERGENCY)
Dept: RADIOLOGY | Facility: HOSPITAL | Age: 85
DRG: 064 | End: 2025-06-16
Payer: COMMERCIAL

## 2025-06-16 DIAGNOSIS — R29.90 STROKE-LIKE SYMPTOMS: ICD-10-CM

## 2025-06-16 DIAGNOSIS — I62.9 INTRACRANIAL BLEED (HCC): Primary | ICD-10-CM

## 2025-06-16 DIAGNOSIS — I61.9 ICH (INTRACEREBRAL HEMORRHAGE) (HCC): ICD-10-CM

## 2025-06-16 DIAGNOSIS — I62.9 INTRACRANIAL HEMORRHAGE (HCC): ICD-10-CM

## 2025-06-16 LAB — GLUCOSE SERPL-MCNC: 108 MG/DL (ref 65–140)

## 2025-06-16 PROCEDURE — 82948 REAGENT STRIP/BLOOD GLUCOSE: CPT

## 2025-06-16 PROCEDURE — 99285 EMERGENCY DEPT VISIT HI MDM: CPT

## 2025-06-16 PROCEDURE — 99291 CRITICAL CARE FIRST HOUR: CPT | Performed by: EMERGENCY MEDICINE

## 2025-06-16 PROCEDURE — 71045 X-RAY EXAM CHEST 1 VIEW: CPT

## 2025-06-16 RX ADMIN — IOHEXOL 85 ML: 350 INJECTION, SOLUTION INTRAVENOUS at 23:59

## 2025-06-17 ENCOUNTER — APPOINTMENT (INPATIENT)
Dept: CT IMAGING | Facility: HOSPITAL | Age: 85
DRG: 064 | End: 2025-06-17
Payer: COMMERCIAL

## 2025-06-17 ENCOUNTER — APPOINTMENT (EMERGENCY)
Dept: CT IMAGING | Facility: HOSPITAL | Age: 85
DRG: 064 | End: 2025-06-17
Payer: COMMERCIAL

## 2025-06-17 ENCOUNTER — APPOINTMENT (INPATIENT)
Dept: NON INVASIVE DIAGNOSTICS | Facility: HOSPITAL | Age: 85
DRG: 064 | End: 2025-06-17
Payer: COMMERCIAL

## 2025-06-17 ENCOUNTER — APPOINTMENT (INPATIENT)
Dept: NEUROLOGY | Facility: HOSPITAL | Age: 85
DRG: 064 | End: 2025-06-17
Attending: PHYSICIAN ASSISTANT
Payer: COMMERCIAL

## 2025-06-17 ENCOUNTER — APPOINTMENT (INPATIENT)
Dept: MRI IMAGING | Facility: HOSPITAL | Age: 85
DRG: 064 | End: 2025-06-17
Payer: COMMERCIAL

## 2025-06-17 PROBLEM — Z85.038 HISTORY OF COLON CANCER: Status: ACTIVE | Noted: 2024-08-08

## 2025-06-17 PROBLEM — I65.21 STENOSIS OF RIGHT CAROTID ARTERY: Status: ACTIVE | Noted: 2025-06-17

## 2025-06-17 PROBLEM — R13.10 DYSPHAGIA: Status: ACTIVE | Noted: 2025-06-17

## 2025-06-17 PROBLEM — N18.31 CHRONIC KIDNEY DISEASE, STAGE 3A (HCC): Status: ACTIVE | Noted: 2023-12-11

## 2025-06-17 PROBLEM — I62.9 INTRACRANIAL HEMORRHAGE (HCC): Status: ACTIVE | Noted: 2025-06-17

## 2025-06-17 PROBLEM — R29.90 STROKE-LIKE SYMPTOMS: Status: ACTIVE | Noted: 2025-06-17

## 2025-06-17 PROBLEM — Z90.49 HISTORY OF HEMICOLECTOMY: Status: ACTIVE | Noted: 2022-04-14

## 2025-06-17 PROBLEM — I87.2 VENOUS STASIS DERMATITIS OF BOTH LOWER EXTREMITIES: Status: ACTIVE | Noted: 2025-04-16

## 2025-06-17 LAB
2HR DELTA HS TROPONIN: 0 NG/L
4HR DELTA HS TROPONIN: 2 NG/L
ALBUMIN SERPL BCG-MCNC: 3.7 G/DL (ref 3.5–5)
ALP SERPL-CCNC: 99 U/L (ref 34–104)
ALT SERPL W P-5'-P-CCNC: 22 U/L (ref 7–52)
ANION GAP SERPL CALCULATED.3IONS-SCNC: 6 MMOL/L (ref 4–13)
ANION GAP SERPL CALCULATED.3IONS-SCNC: 7 MMOL/L (ref 4–13)
APTT PPP: 30 SECONDS (ref 23–34)
AST SERPL W P-5'-P-CCNC: 27 U/L (ref 13–39)
ATRIAL RATE: 69 BPM
ATRIAL RATE: 97 BPM
BACTERIA UR QL AUTO: NORMAL /HPF
BILIRUB SERPL-MCNC: 0.64 MG/DL (ref 0.2–1)
BILIRUB UR QL STRIP: NEGATIVE
BUN SERPL-MCNC: 30 MG/DL (ref 5–25)
BUN SERPL-MCNC: 36 MG/DL (ref 5–25)
CA-I BLD-SCNC: 1.08 MMOL/L (ref 1.12–1.32)
CALCIUM SERPL-MCNC: 8.8 MG/DL (ref 8.4–10.2)
CALCIUM SERPL-MCNC: 8.9 MG/DL (ref 8.4–10.2)
CARDIAC TROPONIN I PNL SERPL HS: 12 NG/L (ref ?–50)
CARDIAC TROPONIN I PNL SERPL HS: 12 NG/L (ref ?–50)
CARDIAC TROPONIN I PNL SERPL HS: 14 NG/L (ref ?–50)
CHLORIDE SERPL-SCNC: 105 MMOL/L (ref 96–108)
CHLORIDE SERPL-SCNC: 105 MMOL/L (ref 96–108)
CLARITY UR: CLEAR
CO2 SERPL-SCNC: 28 MMOL/L (ref 21–32)
CO2 SERPL-SCNC: 28 MMOL/L (ref 21–32)
COLOR UR: YELLOW
CREAT SERPL-MCNC: 1.42 MG/DL (ref 0.6–1.3)
CREAT SERPL-MCNC: 1.56 MG/DL (ref 0.6–1.3)
ERYTHROCYTE [DISTWIDTH] IN BLOOD BY AUTOMATED COUNT: 13.5 % (ref 11.6–15.1)
ERYTHROCYTE [DISTWIDTH] IN BLOOD BY AUTOMATED COUNT: 13.5 % (ref 11.6–15.1)
GFR SERPL CREATININE-BSD FRML MDRD: 40 ML/MIN/1.73SQ M
GFR SERPL CREATININE-BSD FRML MDRD: 45 ML/MIN/1.73SQ M
GLUCOSE SERPL-MCNC: 104 MG/DL (ref 65–140)
GLUCOSE SERPL-MCNC: 106 MG/DL (ref 65–140)
GLUCOSE UR STRIP-MCNC: NEGATIVE MG/DL
HCT VFR BLD AUTO: 29.9 % (ref 36.5–49.3)
HCT VFR BLD AUTO: 31.3 % (ref 36.5–49.3)
HGB BLD-MCNC: 10.3 G/DL (ref 12–17)
HGB BLD-MCNC: 10.7 G/DL (ref 12–17)
HGB UR QL STRIP.AUTO: ABNORMAL
INR PPP: 1.04 (ref 0.85–1.19)
INR PPP: 1.06 (ref 0.85–1.19)
KETONES UR STRIP-MCNC: NEGATIVE MG/DL
LEUKOCYTE ESTERASE UR QL STRIP: NEGATIVE
MAGNESIUM SERPL-MCNC: 2.1 MG/DL (ref 1.9–2.7)
MAGNESIUM SERPL-MCNC: 2.3 MG/DL (ref 1.9–2.7)
MCH RBC QN AUTO: 32.1 PG (ref 26.8–34.3)
MCH RBC QN AUTO: 32.1 PG (ref 26.8–34.3)
MCHC RBC AUTO-ENTMCNC: 34.2 G/DL (ref 31.4–37.4)
MCHC RBC AUTO-ENTMCNC: 34.4 G/DL (ref 31.4–37.4)
MCV RBC AUTO: 93 FL (ref 82–98)
MCV RBC AUTO: 94 FL (ref 82–98)
NITRITE UR QL STRIP: NEGATIVE
NON-SQ EPI CELLS URNS QL MICRO: NORMAL /HPF
P AXIS: 79 DEGREES
PH UR STRIP.AUTO: 6.5 [PH]
PHOSPHATE SERPL-MCNC: 2.6 MG/DL (ref 2.3–4.1)
PLATELET # BLD AUTO: 191 THOUSANDS/UL (ref 149–390)
PLATELET # BLD AUTO: 216 THOUSANDS/UL (ref 149–390)
PMV BLD AUTO: 8.6 FL (ref 8.9–12.7)
PMV BLD AUTO: 8.8 FL (ref 8.9–12.7)
POTASSIUM SERPL-SCNC: 3.3 MMOL/L (ref 3.5–5.3)
POTASSIUM SERPL-SCNC: 3.5 MMOL/L (ref 3.5–5.3)
PR INTERVAL: 150 MS
PR INTERVAL: 156 MS
PROT SERPL-MCNC: 6.8 G/DL (ref 6.4–8.4)
PROT UR STRIP-MCNC: NEGATIVE MG/DL
PROTHROMBIN TIME: 14 SECONDS (ref 12.3–15)
PROTHROMBIN TIME: 14.2 SECONDS (ref 12.3–15)
QRS AXIS: -9 DEGREES
QRS AXIS: 67 DEGREES
QRSD INTERVAL: 74 MS
QRSD INTERVAL: 88 MS
QT INTERVAL: 376 MS
QT INTERVAL: 430 MS
QTC INTERVAL: 460 MS
QTC INTERVAL: 477 MS
RBC # BLD AUTO: 3.21 MILLION/UL (ref 3.88–5.62)
RBC # BLD AUTO: 3.33 MILLION/UL (ref 3.88–5.62)
RBC #/AREA URNS AUTO: NORMAL /HPF
SODIUM SERPL-SCNC: 139 MMOL/L (ref 135–147)
SODIUM SERPL-SCNC: 140 MMOL/L (ref 135–147)
SP GR UR STRIP.AUTO: 1.01 (ref 1–1.03)
T WAVE AXIS: -29 DEGREES
T WAVE AXIS: 70 DEGREES
UROBILINOGEN UR QL STRIP.AUTO: 0.2 E.U./DL
VENTRICULAR RATE: 69 BPM
VENTRICULAR RATE: 97 BPM
WBC # BLD AUTO: 6.7 THOUSAND/UL (ref 4.31–10.16)
WBC # BLD AUTO: 6.97 THOUSAND/UL (ref 4.31–10.16)
WBC #/AREA URNS AUTO: NORMAL /HPF

## 2025-06-17 PROCEDURE — 95816 EEG AWAKE AND DROWSY: CPT | Performed by: PSYCHIATRY & NEUROLOGY

## 2025-06-17 PROCEDURE — 80048 BASIC METABOLIC PNL TOTAL CA: CPT | Performed by: PHYSICIAN ASSISTANT

## 2025-06-17 PROCEDURE — 70551 MRI BRAIN STEM W/O DYE: CPT

## 2025-06-17 PROCEDURE — 99223 1ST HOSP IP/OBS HIGH 75: CPT | Performed by: INTERNAL MEDICINE

## 2025-06-17 PROCEDURE — 95816 EEG AWAKE AND DROWSY: CPT

## 2025-06-17 PROCEDURE — 84484 ASSAY OF TROPONIN QUANT: CPT | Performed by: PHYSICIAN ASSISTANT

## 2025-06-17 PROCEDURE — 99223 1ST HOSP IP/OBS HIGH 75: CPT | Performed by: STUDENT IN AN ORGANIZED HEALTH CARE EDUCATION/TRAINING PROGRAM

## 2025-06-17 PROCEDURE — 85610 PROTHROMBIN TIME: CPT | Performed by: PHYSICIAN ASSISTANT

## 2025-06-17 PROCEDURE — 70496 CT ANGIOGRAPHY HEAD: CPT

## 2025-06-17 PROCEDURE — 93005 ELECTROCARDIOGRAM TRACING: CPT

## 2025-06-17 PROCEDURE — 70450 CT HEAD/BRAIN W/O DYE: CPT

## 2025-06-17 PROCEDURE — 84100 ASSAY OF PHOSPHORUS: CPT | Performed by: PHYSICIAN ASSISTANT

## 2025-06-17 PROCEDURE — 82330 ASSAY OF CALCIUM: CPT | Performed by: PHYSICIAN ASSISTANT

## 2025-06-17 PROCEDURE — 93880 EXTRACRANIAL BILAT STUDY: CPT | Performed by: SURGERY

## 2025-06-17 PROCEDURE — 92610 EVALUATE SWALLOWING FUNCTION: CPT

## 2025-06-17 PROCEDURE — 80053 COMPREHEN METABOLIC PANEL: CPT | Performed by: PHYSICIAN ASSISTANT

## 2025-06-17 PROCEDURE — 81001 URINALYSIS AUTO W/SCOPE: CPT | Performed by: PHYSICIAN ASSISTANT

## 2025-06-17 PROCEDURE — 85730 THROMBOPLASTIN TIME PARTIAL: CPT | Performed by: PHYSICIAN ASSISTANT

## 2025-06-17 PROCEDURE — 83735 ASSAY OF MAGNESIUM: CPT | Performed by: PHYSICIAN ASSISTANT

## 2025-06-17 PROCEDURE — 97535 SELF CARE MNGMENT TRAINING: CPT

## 2025-06-17 PROCEDURE — 97167 OT EVAL HIGH COMPLEX 60 MIN: CPT

## 2025-06-17 PROCEDURE — 97163 PT EVAL HIGH COMPLEX 45 MIN: CPT

## 2025-06-17 PROCEDURE — 93010 ELECTROCARDIOGRAM REPORT: CPT | Performed by: INTERNAL MEDICINE

## 2025-06-17 PROCEDURE — 93880 EXTRACRANIAL BILAT STUDY: CPT

## 2025-06-17 PROCEDURE — 36415 COLL VENOUS BLD VENIPUNCTURE: CPT | Performed by: PHYSICIAN ASSISTANT

## 2025-06-17 PROCEDURE — 85027 COMPLETE CBC AUTOMATED: CPT | Performed by: PHYSICIAN ASSISTANT

## 2025-06-17 PROCEDURE — 70498 CT ANGIOGRAPHY NECK: CPT

## 2025-06-17 PROCEDURE — NC001 PR NO CHARGE: Performed by: INTERNAL MEDICINE

## 2025-06-17 RX ORDER — ACETAMINOPHEN 160 MG/5ML
650 SUSPENSION ORAL EVERY 6 HOURS PRN
Status: DISCONTINUED | OUTPATIENT
Start: 2025-06-17 | End: 2025-06-17

## 2025-06-17 RX ORDER — LABETALOL HYDROCHLORIDE 5 MG/ML
20 INJECTION, SOLUTION INTRAVENOUS ONCE
Status: COMPLETED | OUTPATIENT
Start: 2025-06-17 | End: 2025-06-17

## 2025-06-17 RX ORDER — CALCIUM GLUCONATE 20 MG/ML
2 INJECTION, SOLUTION INTRAVENOUS ONCE
Status: COMPLETED | OUTPATIENT
Start: 2025-06-17 | End: 2025-06-17

## 2025-06-17 RX ORDER — LABETALOL HYDROCHLORIDE 5 MG/ML
10 INJECTION, SOLUTION INTRAVENOUS ONCE
Status: COMPLETED | OUTPATIENT
Start: 2025-06-17 | End: 2025-06-17

## 2025-06-17 RX ORDER — LABETALOL HYDROCHLORIDE 5 MG/ML
5 INJECTION, SOLUTION INTRAVENOUS EVERY 4 HOURS PRN
Status: DISCONTINUED | OUTPATIENT
Start: 2025-06-17 | End: 2025-06-19

## 2025-06-17 RX ORDER — POTASSIUM CHLORIDE 14.9 MG/ML
20 INJECTION INTRAVENOUS ONCE
Status: COMPLETED | OUTPATIENT
Start: 2025-06-17 | End: 2025-06-17

## 2025-06-17 RX ORDER — LABETALOL HYDROCHLORIDE 5 MG/ML
10 INJECTION, SOLUTION INTRAVENOUS EVERY 4 HOURS PRN
Status: DISCONTINUED | OUTPATIENT
Start: 2025-06-17 | End: 2025-06-17

## 2025-06-17 RX ORDER — CHLORHEXIDINE GLUCONATE ORAL RINSE 1.2 MG/ML
15 SOLUTION DENTAL EVERY 12 HOURS SCHEDULED
Status: DISCONTINUED | OUTPATIENT
Start: 2025-06-17 | End: 2025-06-20 | Stop reason: HOSPADM

## 2025-06-17 RX ADMIN — LABETALOL HYDROCHLORIDE 10 MG: 5 INJECTION, SOLUTION INTRAVENOUS at 02:23

## 2025-06-17 RX ADMIN — CHLORHEXIDINE GLUCONATE 15 ML: 1.2 RINSE ORAL at 21:04

## 2025-06-17 RX ADMIN — CALCIUM GLUCONATE 2 G: 20 INJECTION, SOLUTION INTRAVENOUS at 05:16

## 2025-06-17 RX ADMIN — LABETALOL HYDROCHLORIDE 10 MG: 5 INJECTION, SOLUTION INTRAVENOUS at 03:44

## 2025-06-17 RX ADMIN — LABETALOL HYDROCHLORIDE 20 MG: 5 INJECTION, SOLUTION INTRAVENOUS at 04:50

## 2025-06-17 RX ADMIN — LABETALOL HYDROCHLORIDE 10 MG: 5 INJECTION, SOLUTION INTRAVENOUS at 12:26

## 2025-06-17 RX ADMIN — SODIUM CHLORIDE, SODIUM LACTATE, POTASSIUM CHLORIDE, AND CALCIUM CHLORIDE 500 ML: .6; .31; .03; .02 INJECTION, SOLUTION INTRAVENOUS at 16:03

## 2025-06-17 RX ADMIN — POTASSIUM CHLORIDE 20 MEQ: 14.9 INJECTION, SOLUTION INTRAVENOUS at 06:10

## 2025-06-17 RX ADMIN — CHLORHEXIDINE GLUCONATE 15 ML: 1.2 RINSE ORAL at 09:30

## 2025-06-17 NOTE — QUICK NOTE
Patient not seen, recommendation based on information provided by ED physician over the phone    Stroke alert called: 1140pm  Neurology response immediate  LKW: 2130  NIHSS=7  for  aphasia, RLE drift per ED exam by Dr. Harrison     CTH: small acute ICH, discussed with radiologist Dr. Mitchell Westfall  CTA H/N: no LVO     IVtPA: TNK Decision: no, ICH  Thrombectomy: no, no LVO    A/P   86 yo M, presented with stroke alert for acute onset aphasia and RLE drift.   CTH showed small ICH.   not a candidate for TNK due to ICH      Recommend admit under stroke/ICH protocol  Keep SBP<140 due to ICH  Hold AP/AC for now  Dr Harrison will contact [facility]  If stay, recommend to repeat CTH in 6 hour or with any acute change  MRI brain with and without contrast routine  Routine EEG when able    Discussed with ED physician at time of alert    Other comorbidity or condition, will defer to the primary team or ED physician

## 2025-06-17 NOTE — CONSULTS
e-Consult (IPC)     Inpatient consult to Neurocritical care  Consult performed by: Jack Solis DO  Consult ordered by: Abdi Harrison MD           Contacted by Dr. Harrison/ Dr. Carlson.    Maximino Diaz . 85 y.o. male MRN: [id number]  Unit/Bed#: -01 Encounter: [id number]    Reason for Consult    Per provider report, patient presents with stroke like symptoms. Available past medical history,social history, surgical history, medication list, drug allergies and review of systems were reviewed.    /62   Pulse 84   Temp 98.1 °F (36.7 °C) (Axillary)   Resp 16   Ht 5' (1.524 m)   Wt 52 kg (114 lb 10.2 oz)   SpO2 96%   BMI 22.39 kg/m²      Clinical exam per provider report, headache, acute confusion, garbled speech. .    Imaging personally reviewed. New small area of presumed intraparenchymal hemorrhage measuring up to 8 mm in the left posterior internal capsule region.     Repeat CT Head grossly unchanged.    Assessment and Recommendations    1. Left sided small ICH (ICH score 1)  Hemorrhagic stroke pathway.   Neuro checks per protocol.   BP control with SBP goal 110 to 140 mmHg.  Repeat CT head 6 post initial or sooner if clinical change.   Reverse coagulopathy as needed.  No chemical anticoagulation/DVT prophylaxis for now- SCDs.   Seizure prophylaxis/ treatment as indicated.  MRI brain.  Neurology and Neurosurgery consult.  PT/OT/PMR and Speech eval.      All questions answered. Provider is in agreement with the course of action.  21-30 minutes, >50% of the total time devoted to medical consultative verbal/EMR discussion between providers. Written report will be generated in the EMR.

## 2025-06-17 NOTE — OCCUPATIONAL THERAPY NOTE
Occupational Therapy Evaluation     Patient Name: Maximino Diaz Sr.  Today's Date: 6/17/2025  Problem List  Principal Problem:    Intracranial hemorrhage (HCC)  Active Problems:    Severe protein-calorie malnutrition (HCC)    Stenosis of right carotid artery    Chronic kidney disease, stage 3a (HCC)    Stroke-like symptoms    Past Medical History  Past Medical History[1]  Past Surgical History  Past Surgical History[2]       06/17/25 0957   Note Type   Note type Evaluation   Pain Assessment   Pain Assessment Tool FLACC   Pain Rating: FLACC (Rest) - Face 0   Pain Rating: FLACC (Rest) - Legs 0   Pain Rating: FLACC (Rest) - Activity 0   Pain Rating: FLACC (Rest) - Cry 0   Pain Rating: FLACC (Rest) - Consolability 0   Score: FLACC (Rest) 0   Pain Rating: FLACC (Activity) - Face 0   Pain Rating: FLACC (Activity) - Legs 0   Pain Rating: FLACC (Activity) - Activity 0   Pain Rating: FLACC (Activity) - Cry 0   Pain Rating: FLACC (Activity) - Consolability 0   Score: FLACC (Activity) 0   Restrictions/Precautions   Other Precautions Cognitive;Chair Alarm;Bed Alarm;Fall Risk;Telemetry;Hard of hearing   Home Living   Additional Comments Pt poor historian, unable to provide home setup or PLOF.   Prior Function   Lives With Son   Falls in the last 6 months   (Chart indicates recent fall down flight of stairs at home)   Comments Per chart review, pt lives at home with son and is ambulatory with RW.   ADL   UB Dressing Assistance 2  Maximal Assistance   UB Dressing Deficit Thread RUE;Thread LUE;Pull over head;Pull around back;Pull down in back   LB Dressing Assistance 1  Total Assistance   LB Dressing Deficit Don/doff R sock;Don/doff L sock   Additional Comments Requires max assist for UB ADLs due to impaired AROM/strength/coordination. Total assistance to doff/don B socks while seated at EOB.   Bed Mobility   Supine to Sit 2  Maximal assistance   Additional items Assist x 1;Increased time required;Verbal cues;LE  management;Other;Comment  (Trunk management)   Transfers   Sit to Stand 3  Moderate assistance   Additional items Assist x 1;Increased time required;Verbal cues   Stand to Sit 3  Moderate assistance   Additional items Assist x 1;Increased time required;Verbal cues   Stand pivot 2  Maximal assistance   Additional items Assist x 1;Increased time required;Verbal cues   Additional Comments Using RW. Noted LE scissoring during spt.   Functional Mobility   Functional Mobility 3  Moderate assistance   Additional Comments Pt participated in short functional household distance in room with mod assist x 1 and use of RW, chair follow x 2nd person for safety.   Additional items Rolling walker   Balance   Static Sitting Fair -   Dynamic Sitting Poor +   Static Standing Poor +   Dynamic Standing Poor   Ambulatory Poor  (RW)   Activity Tolerance   Activity Tolerance Patient limited by fatigue  (Pt limited by cognitive impairments)   Medical Staff Made Aware PTZachariah   Nurse Made Aware Fiona TIPTON   RUSURY Assessment   RUE Assessment X   RUE Strength   R Shoulder Flexion 2+/5   LUE Assessment   LUE Assessment X   LUE Strength   L Shoulder Flexion 2+/5   Hand Function   Gross Motor Coordination   (Semi-functional)   Fine Motor Coordination Impaired   Cognition   Overall Cognitive Status Impaired   Arousal/Participation Cooperative   Attention Attends with cues to redirect   Orientation Level Oriented to person;Disoriented to place   Memory Unable to assess   Following Commands Follows one step commands with increased time or repetition   Assessment   Limitation Decreased ADL status;Decreased UE ROM;Decreased UE strength;Decreased Safe judgement during ADL;Decreased cognition;Decreased endurance;Decreased fine motor control;Decreased self-care trans;Decreased high-level ADLs   Prognosis Guarded   Plan   Treatment Interventions ADL retraining;Functional transfer training;UE strengthening/ROM;Endurance training;Cognitive  reorientation;Patient/family training;Neuromuscular reeducation;Equipment evaluation/education;Fine motor coordination activities;Compensatory technique education;Continued evaluation;Energy conservation;Activityengagement;Cardiac education   Goal Expiration Date 07/01/25   OT Treatment Day 1   OT Frequency 2-3x/wk   Discharge Recommendation   Rehab Resource Intensity Level, OT II (Moderate Resource Intensity)   AM-PAC Daily Activity Inpatient   Lower Body Dressing 1   Bathing 1   Toileting 1   Upper Body Dressing 2   Grooming 2   Eating 2   Daily Activity Raw Score 9   Turning Head Towards Sound 3   Follow Simple Instructions 2   Low Function Daily Activity Raw Score 14   Low Function Daily Activity Standardized Score  24.79   AM-PAC Applied Cognition Inpatient   Following a Speech/Presentation 2   Understanding Ordinary Conversation 2   Taking Medications 1   Remembering Where Things Are Placed or Put Away 2   Remembering List of 4-5 Errands 1   Taking Care of Complicated Tasks 1   Applied Cognition Raw Score 9   Applied Cognition Standardized Score 22.48   Additional Treatment Session   Start Time 1020   End Time 1028   Treatment Assessment Pt tolerated treatment poorly. He is limited by decreased generalized strength, fatigue, balance deficits, and cognitive impairments. He will benefit from continued skilled OT services in attempt to regain his independence with ADLs and functional mobility. Treatment intervention initiated when pt seated on recliner chair. Pt requiring total assistance for bathing the UB/LB in the seated position with use of bath wipes. Pt doffs/dons new gown with maximal assistance. Pt sit > stand from recliner chair with mod assist x 1 and spt from recliner chair to bed with max assist x 1. RW utilized. Pt continues with LE scissoring requiring manual cues to correct. Max assist x 1 to return to supine.   End of Consult   Patient Position at End of Consult Supine;Bed/Chair alarm  activated;All needs within reach     Assessment:  Pt is 85 y.o., male, evaluated at [facility] 06/17/25 due to Intracranial hemorrhage (HCC). OT order placed to assess Pt's ADLs, cognitive status, and performance during functional tasks in order to maximize safety and independence while making most appropriate d/c recommendations. An occupational profile and medical/therapy history were completed, including a extensive review of physical, cognitive, psychosocial history related to pt’s current functional performance. Pt with PMHx impacting their performance during ADL tasks include: colon cancer, GERD, CKD 3a, stenosis of R carotid artery, SBO, chronic anemia.  PT/OT skilled co-evaluation was completed considering low AM-PAC mobility score, medical complexity, multiple comorbidities, and cognitive impairments. Performance deficits/impairments identified at time of initial evaluation, that are impacting Pt's occupational performance and ability to safely return to [facility], include decreased UB/LB dressing, decreased toileting, decreased UB/LB bathing, decreased personal hygiene, decreased grooming , decreased financial management, decreased medication management, decreased meal preparation, decreased rest/sleep hygiene, decreased leisure activities, decreased eating/feeding, decreased driving/community mobility, decreased functional household distances, decreased functional community distances, and decreased household cleaning/decreased ROM, decreased strength, decreased endurance, decreased activity tolerance, impaired balance, impaired coordination, impaired skin integrity, impaired cognition, impaired memory, decreased attention, impaired reception of language, impaired expression of language, difficulty problem solving, decreased safety awareness, impaired judgement, incontinence, and involuntary movement. Personal factors such as advanced age, need for assistive device, inaccessible home environment, limited availability  of support from family/caregiver, decreased caregiver assistance, inability to ambulate community distances, need for assistance with ADLs, need for assistance with IADLs, frequent falls/fall history, fall risk, limited insight to deficits, decreased initiation and engagement, decreased community engagement, and inability to navigate level surfaces without external assistance, and medical complications of abnormal renal lab values, abnormal H&H, abnormal CBC, abnormal potassium values, wounds, decreased skin integrity, incontinence, need for input for mobility technique/safety, cachexia, and anorexia are also affecting pt and may cause a barrier to discharge. In consideration of history obtained, identification of performance deficits, comorbidities that affect occupational performance, clinical presentation/decision-making, and modification of tasks/assistance required to enable pt to complete evaluation components, this evaluation is determined to be of high complexity. Pt will benefit from continued skilled acute OT services to address barriers as defined above and to maximize level of independence/participation during ADLs and functional tasks to facilitate return toward PLOF and improved QoL. Pt would benefit from skilled OT intervention to address functional mobility, UE strengthening, UE ROM, functional reach, FMC, GMC, endurance, and tolerance to upright position to maximize the potential of meeting client centered goals. From an OT standpoint, Level II (Moderate Resource Intensity) is recommended upon d/c.      The patient's raw score on the AM-PAC Daily Activity Inpatient Short Form is 9. A raw score of less than 19 suggests the patient may benefit from discharge to post-acute rehabilitation services. Please refer to the recommendation of the Occupational Therapist for safe discharge planning.    Pt goals to be met by 7/1/2025:    Pt will demonstrate ability to complete grooming/hygiene tasks @ min assist  after set-up.  Pt will demonstrate ability to complete supine<>sit @ min assist in order to increase safety and independence during ADL tasks.  Pt will demonstrate ability to complete UB ADLs including washing/dressing @ min assist in order to increase performance and participation during meaningful tasks  Pt will demonstrate ability to complete LB dressing @ mod assist in order to increase safety and independence during meaningful tasks.   Pt will demonstrate ability to gus/doff socks/shoes while sitting EOB/chair @ mod assist in order to increase safety and independence during meaningful tasks.   Pt will demonstrate ability to complete toileting tasks including CM and pericare @ mod assist in order to increase safety and independence during meaningful tasks.  Pt will demonstrate ability to complete EOB, chair, toilet/commode transfers @ min assist in order to increase performance and participation during functional tasks.  Pt will demonstrate ability to stand for 3 minutes while maintaining Poor+ balance with use of RW for UB support.  Pt will demonstrate ability to tolerate 20 minute OT session with no vc'ing for deep breathing or use of energy conservation techniques in order to increase activity tolerance during functional tasks.   Pt will demonstrate Good attention and participation in continued evaluation of functional ambulation house hold distances in order to assist with safe d/c planning.  Pt will attend to continued cognitive assessments 100% of the time in order to provide most appropriate d/c recommendations.   Pt will follow 100% simple 1-step commands and be A&O x2 consistently with environmental cues to increase participation in functional activities.       Bennie Rose MS, OTR/L       [1]   Past Medical History:  Diagnosis Date    Colon cancer (HCC)     GERD (gastroesophageal reflux disease)    [2]   Past Surgical History:  Procedure Laterality Date    EXPLORATORY LAPAROTOMY W/ BOWEL  RESECTION N/A 4/5/2022    Procedure: LAPAROTOMY EXPLORATORY, RIGHT DWAYNE COLECTOMY WITH ANASTOMOSIS;  Surgeon: Amy Parker DO;  Location:  MAIN OR;  Service: General    HERNIA REPAIR

## 2025-06-17 NOTE — PLAN OF CARE
Problem: PAIN - ADULT  Goal: Verbalizes/displays adequate comfort level or baseline comfort level  Description: Interventions:  - Encourage patient to monitor pain and request assistance  - Assess pain using appropriate pain scale  - Administer analgesics as ordered based on type and severity of pain and evaluate response  - Implement non-pharmacological measures as appropriate and evaluate response  - Consider cultural and social influences on pain and pain management  - Notify physician/advanced practitioner if interventions unsuccessful or patient reports new pain  - Educate patient/family on pain management process including their role and importance of  reporting pain   - Provide non-pharmacologic/complimentary pain relief interventions  Outcome: Progressing     Problem: SAFETY ADULT  Goal: Patient will remain free of falls  Description: INTERVENTIONS:  - Educate patient/family on patient safety including physical limitations  - Instruct patient to call for assistance with activity   - Consider consulting OT/PT to assist with strengthening/mobility based on AM PAC & JH-HLM score  - Consult OT/PT to assist with strengthening/mobility   - Keep Call bell within reach  - Keep bed low and locked with side rails adjusted as appropriate  - Keep care items and personal belongings within reach  - Initiate and maintain comfort rounds  - Make Fall Risk Sign visible to staff  - Offer Toileting every 2 Hours, in advance of need  - Initiate/Maintain alarm  - Obtain necessary fall risk management equipment  - Apply yellow socks and bracelet for high fall risk patients  - Consider moving patient to room near nurses station  Outcome: Progressing

## 2025-06-17 NOTE — ASSESSMENT & PLAN NOTE
Lab Results   Component Value Date    EGFR 40 06/17/2025    EGFR 38 06/01/2025    EGFR 45 (L) 04/09/2025    CREATININE 1.56 (H) 06/17/2025    CREATININE 1.60 (H) 06/01/2025    CREATININE 1.5 (H) 04/09/2025     Appears to be at baseline on admission.

## 2025-06-17 NOTE — ASSESSMENT & PLAN NOTE
Malnutrition Findings:                                 BMI Findings:           Body mass index is 20.21 kg/m².

## 2025-06-17 NOTE — ASSESSMENT & PLAN NOTE
Maximino Rowlandvy Chicas is a 85 y.o.  male with severe protein malnutrition, CKD, SBO, chronic anemia and colon cancer who presents as stroke alert for lethargy, confusion and speech issue. LKW: 2130. NIHSS=7  for  aphasia, RLE drift per ED exam. CTH with small ICH, ICH score 1.  GOC discussion per ICU and ED mentions no aggressive intervention so transferred not done.     - No TNK given as CT with hemorrhage  - Obtain MRI brain with and without contrast (known colon cancer history) . Repeat CTH at 24 hours if MRI not completed.   - Obtain routine EEG. Family  on signs and symptoms of seizures.   - HOB 30 degrees, monitor for increased ICP  - Strict blood pressure control, goal -140, prn labetalol (nicardipine gtt)  - no antiplatelets or anticoagulation, check coagulation factors  - PT/OT/speech  - Stat CT if any changes in neurological status  - NPO until bedside swallow, then low Na, low Chol diet  - DVT ppx: SCDs for 24h then consider DVT ppx if ICH stable  - Seizure precautions given   - Ensure K>4, Mg> 2 and Ca>9  - Ativan 2mg IV PRN for generalized seizures lasting > 2 minutes or > 3 partial seizures in 1h without return to baseline  - Avoid epileptogenic medications such as wellbutrin, cefepime, ultram, quinolones, and imipenum

## 2025-06-17 NOTE — ASSESSMENT & PLAN NOTE
CTA head/neck: Approximately 50% right cervical internal carotid artery stenosis by NASCET criteria. No evidence of large intracranial aneurysm, high-grade stenosis, or large vessel occlusion.  Carotid US ordered.  Start aspirin/plavix when medically appropriate per Neurology.

## 2025-06-17 NOTE — ED PROVIDER NOTES
Time reflects when diagnosis was documented in both MDM as applicable and the Disposition within this note       Time User Action Codes Description Comment    6/16/2025 11:50 PM Rylan Rich Add [R29.90] Stroke-like symptoms     6/17/2025 12:44 AM Abdi Harrison Add [I62.9] Intracranial bleed (HCC)     6/17/2025 12:44 AM Abdi Harrison Modify [R29.90] Stroke-like symptoms     6/17/2025 12:44 AM Abid Harrison Modify [I62.9] Intracranial bleed (HCC)     6/17/2025  2:47 AM Nadia Santiago Add [I62.9] Intracranial hemorrhage (HCC)           ED Disposition       ED Disposition   Admit    Condition   Stable    Date/Time   Tue Jun 17, 2025  2:21 AM    Comment   Case was discussed with Tonya Carlson MD and the patient's admission status was agreed to be Admission Status: inpatient status to the service of Dr. Carlson.               Assessment & Plan       Medical Decision Making  Patient was seen and evaluated for his presentation as outlined.  Stroke alert called from prehospital setting.  Appropriate stroke workup initiated.  Scored NIHSS of 7 at time of initial assessment.  CT showed 8 mm intraparenchymal bleed.  Patient confirmed to not be on any blood thinners or antiplatelet medication.  Dr. Child, on-call for stroke alert, recommended close blood pressure control to keep systolic below 140.  Also recommended consultation with neurocritical care as well as discussion with family.  Case discussed with Dr. Solis with neurocritical care via secure chat.  Family also updated.  Patient's DNR status not yet confirmed, but son and daughter at bedside agreed that patient would not be interested in surgical intervention should his condition decompensate.  Therefore, decision made to admit patient here to ICU for further monitoring, blood pressure control, teleconsultation with neurocritical care.    Critical Care Time Statement: Upon my evaluation, this patient had a high probability of imminent or life-threatening  deterioration due to acute stroke, intracranial bleed, which required my direct attention, intervention, and personal management.  I spent a total of 60 minutes directly providing critical care services, including evaluating for the presence of life-threatening injuries or illnesses, management of organ system failure(s) , complex medical decision making (to support/prevent further life-threatening deterioration)., family consultation, specialist consultation, frequent reassessments. This time is exclusive of procedures, teaching, treating other patients, family meetings, and any prior time recorded by providers other than myself.        Amount and/or Complexity of Data Reviewed  Labs: ordered.  ECG/medicine tests: ordered and independent interpretation performed.     Details: EKG by my interpretation demonstrates normal sinus rhythm with sinus arrhythmia at a ventricular rate of 97 bpm.  Normal axis, normal intervals.  No acute ST elevations or T wave versions.  Minimal LVH present.  Compared with prior EKG from April 5, 2022, nonspecific ST abnormality is no longer present.    Risk  Prescription drug management.  Decision regarding hospitalization.             Medications   chlorhexidine (PERIDEX) 0.12 % oral rinse 15 mL (has no administration in time range)   labetalol (NORMODYNE) injection 10 mg (has no administration in time range)   acetaminophen (TYLENOL) oral suspension 650 mg (has no administration in time range)   iohexol (OMNIPAQUE) 350 MG/ML injection (MULTI-DOSE) 85 mL (85 mL Intravenous Given 6/16/25 7929)   labetalol (NORMODYNE) injection 10 mg (10 mg Intravenous Given 6/17/25 0223)       ED Risk Strat Scores         Stroke Assessment       Row Name 06/16/25 2640             NIH Stroke Scale    Interval Baseline      Level of Consciousness (1a.) 0      LOC Questions (1b.) 2      LOC Commands (1c.) 0      Best Gaze (2.) 0      Visual (3.) 0      Facial Palsy (4.) 0      Motor Arm, Left (5a.) 0       Motor Arm, Right (5b.) 0      Motor Leg, Left (6a.) 0      Motor Leg, Right (6b.) 1      Limb Ataxia (7.) 0      Sensory (8.) 0      Best Language (9.) 2      Dysarthria (10.) 2      Extinction and Inattention (11.) (Formerly Neglect) 0      Total 7                              No data recorded                            History of Present Illness       Chief Complaint   Patient presents with    STROKE Alert     Pt is normally a+ox4 and independent. Family states last known well was 2130. States they found him with garbled speech and was unable to stand       Past Medical History[1]   Past Surgical History[2]   Family History[3]   Social History[4]   E-Cigarette/Vaping    E-Cigarette Use Never User       E-Cigarette/Vaping Substances      I have reviewed and agree with the history as documented.     Patient presents to the emergency department via EMS from home.  Was last known well at approximately 2130.  Was found this evening with garbled speech, very lethargic, difficult to understand, acutely changed from baseline.  Additional history review of systems limited due to medical urgency and patient mental status.            Review of Systems   Unable to perform ROS: Mental status change           Objective       ED Triage Vitals   Temperature Pulse Blood Pressure Respirations SpO2 Patient Position - Orthostatic VS   06/17/25 0030 06/17/25 0000 06/17/25 0000 06/17/25 0000 06/17/25 0000 06/17/25 0000   100 °F (37.8 °C) 96 126/74 17 96 % Lying      Temp Source Heart Rate Source BP Location FiO2 (%) Pain Score    06/17/25 0030 06/17/25 0000 06/17/25 0329 -- 06/17/25 0400    Temporal Monitor Left arm  No Pain      Vitals      Date and Time Temp Pulse SpO2 Resp BP Pain Score FACES Pain Rating User   06/17/25 0700 -- 77 94 % 19 114/68 -- -- AF   06/17/25 0630 -- 77 95 % 18 145/102 -- -- AF   06/17/25 0600 -- -- -- -- -- No Pain -- AF   06/17/25 0530 -- 72 96 % 16 119/56 -- -- AF   06/17/25 0500 -- 84 96 % 16 130/62 -- --     06/17/25 0430 -- 80 96 % 16 169/77 -- --    06/17/25 0400 -- 84 95 % 17 131/66 No Pain --    06/17/25 0330 -- 92 95 % 16 146/83 -- --    06/17/25 0329 98.1 °F (36.7 °C) 95 95 % 20 113/78 -- --    06/17/25 0300 -- 93 94 % 18 151/76 -- --    06/17/25 0245 -- 93 95 % 18 169/105 -- --    06/17/25 0230 -- 97 95 % 19 163/93 -- --    06/17/25 0215 -- 115 94 % 18 176/86 -- --    06/17/25 0200 -- 106 95 % 17 161/80 -- --    06/17/25 0145 -- 103 95 % 16 150/102 -- --    06/17/25 0130 -- 111 96 % 17 170/95 -- --    06/17/25 0115 -- 101 96 % 17 169/96 -- --    06/17/25 0030 100 °F (37.8 °C) -- -- -- -- -- --    06/17/25 0015 -- 95 97 % 16 140/77 -- --    06/17/25 0000 -- 96 96 % 17 126/74 -- --             Physical Exam  Vitals and nursing note reviewed.   Constitutional:       Appearance: He is well-developed. He is ill-appearing. He is not toxic-appearing.      Comments: Awake and follows commands, but having speech difficulty.   HENT:      Head: Normocephalic and atraumatic.      Mouth/Throat:      Mouth: Mucous membranes are dry.     Eyes:      Extraocular Movements: Extraocular movements intact.       Cardiovascular:      Rate and Rhythm: Normal rate and regular rhythm.      Pulses: Normal pulses.      Heart sounds: Normal heart sounds. No murmur heard.     No friction rub. No gallop.   Pulmonary:      Effort: Pulmonary effort is normal. No respiratory distress.      Breath sounds: Normal breath sounds. No wheezing, rhonchi or rales.   Abdominal:      General: Abdomen is flat. There is no distension.      Palpations: Abdomen is soft. There is no mass.      Tenderness: There is no abdominal tenderness. There is no guarding or rebound.     Musculoskeletal:         General: No swelling. Normal range of motion.      Cervical back: Normal range of motion and neck supple.     Skin:     General: Skin is warm and dry.      Capillary Refill: Capillary refill takes less than 2 seconds.      Neurological:      Comments: See NIHSS.   Psychiatric:      Comments: Unable to fully assess.         Results Reviewed       Procedure Component Value Units Date/Time    Protime-INR [[id number]]  (Normal) Collected: 06/17/25 0448    Lab Status: Final result Specimen: Blood from Arm, Right Updated: 06/17/25 0543     Protime 14.2 seconds      INR 1.06    Narrative:      INR Therapeutic Range    Indication                                             INR Range      Atrial Fibrillation                                               2.0-3.0  Hypercoagulable State                                    2.0.2.3  Left Ventricular Asist Device                            2.0-3.0  Mechanical Heart Valve                                  -    Aortic(with afib, MI, embolism, HF, LA enlargement,    and/or coagulopathy)                                     2.0-3.0 (2.5-3.5)     Mitral                                                             2.5-3.5  Prosthetic/Bioprosthetic Heart Valve               2.0-3.0  Venous thromboembolism (VTE: VT, PE        2.0-3.0    HS Troponin I 4hr [[id number]]  (Normal) Collected: 06/17/25 0448    Lab Status: Final result Specimen: Blood from Arm, Right Updated: 06/17/25 0528     hs TnI 4hr 14 ng/L      Delta 4hr hsTnI 2 ng/L     Basic metabolic panel [[id number]]  (Abnormal) Collected: 06/17/25 0448    Lab Status: Final result Specimen: Blood from Arm, Right Updated: 06/17/25 0520     Sodium 139 mmol/L      Potassium 3.3 mmol/L      Chloride 105 mmol/L      CO2 28 mmol/L      ANION GAP 6 mmol/L      BUN 30 mg/dL      Creatinine 1.42 mg/dL      Glucose 106 mg/dL      Calcium 8.8 mg/dL      eGFR 45 ml/min/1.73sq m     Narrative:      National Kid[employer] Foundation guidelines for Chronic Kidney Disease (CKD):     Stage 1 with normal or high GFR (GFR > 90 mL/min/1.73 square meters)    Stage 2 Mild CKD (GFR = 60-89 mL/min/1.73 square meters)    Stage 3A Moderate CKD (GFR = 45-59 mL/min/1.73 square  meters)    Stage 3B Moderate CKD (GFR = 30-44 mL/min/1.73 square meters)    Stage 4 Severe CKD (GFR = 15-29 mL/min/1.73 square meters)    Stage 5 End Stage CKD (GFR <15 mL/min/1.73 square meters)  Note: GFR calculation is accurate only with a steady state creatinine    Magnesium [[id number]]  (Normal) Collected: 06/17/25 0448    Lab Status: Final result Specimen: Blood from Arm, Right Updated: 06/17/25 0520     Magnesium 2.1 mg/dL     Phosphorus [[id number]]  (Normal) Collected: 06/17/25 0448    Lab Status: Final result Specimen: Blood from Arm, Right Updated: 06/17/25 0520     Phosphorus 2.6 mg/dL     CBC (With Platelets) [[id number]]  (Abnormal) Collected: 06/17/25 0448    Lab Status: Final result Specimen: Blood from Arm, Right Updated: 06/17/25 0454     WBC 6.70 Thousand/uL      RBC 3.21 Million/uL      Hemoglobin 10.3 g/dL      Hematocrit 29.9 %      MCV 93 fL      MCH 32.1 pg      MCHC 34.4 g/dL      RDW 13.5 %      Platelets 191 Thousands/uL      MPV 8.6 fL     Calcium, ionized [[id number]]  (Abnormal) Collected: 06/17/25 0448    Lab Status: Final result Specimen: Blood from Arm, Right Updated: 06/17/25 0454     Calcium, Ionized 1.08 mmol/L     HS Troponin I 2hr [[id number]]  (Normal) Collected: 06/17/25 0223    Lab Status: Final result Specimen: Blood from Arm, Right Updated: 06/17/25 0252     hs TnI 2hr 12 ng/L      Delta 2hr hsTnI 0 ng/L     Urine Microscopic [[id number]]  (Normal) Collected: 06/17/25 0111    Lab Status: Final result Specimen: Urine, Clean Catch Updated: 06/17/25 0128     RBC, UA 0-1 /hpf      WBC, UA None Seen /hpf      Epithelial Cells Occasional /hpf      Bacteria, UA None Seen /hpf     UA w Reflex to Microscopic w Reflex to Culture [[id number]]  (Abnormal) Collected: 06/17/25 0111    Lab Status: Final result Specimen: Urine, Clean Catch Updated: 06/17/25 0121     Color, UA Yellow     Clarity, UA Clear     Specific Gravity, UA 1.010     pH, UA 6.5     Leukocytes, UA Negative     Nitrite, UA  Negative     Protein, UA Negative mg/dl      Glucose, UA Negative mg/dl      Ketones, UA Negative mg/dl      Urobilinogen, UA 0.2 E.U./dl      Bilirubin, UA Negative     Occult Blood, UA Trace-lysed    HS Troponin 0hr (reflex protocol) [[id number]]  (Normal) Collected: 06/17/25 0015    Lab Status: Final result Specimen: Blood from Arm, Right Updated: 06/17/25 0054     hs TnI 0hr 12 ng/L     Comprehensive metabolic panel [[id number]]  (Abnormal) Collected: 06/17/25 0015    Lab Status: Final result Specimen: Blood from Arm, Right Updated: 06/17/25 0048     Sodium 140 mmol/L      Potassium 3.5 mmol/L      Chloride 105 mmol/L      CO2 28 mmol/L      ANION GAP 7 mmol/L      BUN 36 mg/dL      Creatinine 1.56 mg/dL      Glucose 104 mg/dL      Calcium 8.9 mg/dL      AST 27 U/L      ALT 22 U/L      Alkaline Phosphatase 99 U/L      Total Protein 6.8 g/dL      Albumin 3.7 g/dL      Total Bilirubin 0.64 mg/dL      eGFR 40 ml/min/1.73sq m     Narrative:      National Kidney Disease Foundation guidelines for Chronic Kidney Disease (CKD):     Stage 1 with normal or high GFR (GFR > 90 mL/min/1.73 square meters)    Stage 2 Mild CKD (GFR = 60-89 mL/min/1.73 square meters)    Stage 3A Moderate CKD (GFR = 45-59 mL/min/1.73 square meters)    Stage 3B Moderate CKD (GFR = 30-44 mL/min/1.73 square meters)    Stage 4 Severe CKD (GFR = 15-29 mL/min/1.73 square meters)    Stage 5 End Stage CKD (GFR <15 mL/min/1.73 square meters)  Note: GFR calculation is accurate only with a steady state creatinine    Magnesium [[id number]]  (Normal) Collected: 06/17/25 0015    Lab Status: Final result Specimen: Blood from Arm, Right Updated: 06/17/25 0048     Magnesium 2.3 mg/dL     Protime-INR [[id number]]  (Normal) Collected: 06/17/25 0015    Lab Status: Final result Specimen: Blood from Arm, Right Updated: 06/17/25 0046     Protime 14.0 seconds      INR 1.04    Narrative:      INR Therapeutic Range    Indication                                             INR  Range      Atrial Fibrillation                                               2.0-3.0  Hypercoagulable State                                    2.0.2.3  Left Ventricular Asist Device                            2.0-3.0  Mechanical Heart Valve                                  -    Aortic(with afib, MI, embolism, HF, LA enlargement,    and/or coagulopathy)                                     2.0-3.0 (2.5-3.5)     Mitral                                                             2.5-3.5  Prosthetic/Bioprosthetic Heart Valve               2.0-3.0  Venous thromboembolism (VTE: VT, PE        2.0-3.0    APTT [[id number]]  (Normal) Collected: 06/17/25 0015    Lab Status: Final result Specimen: Blood from Arm, Right Updated: 06/17/25 0046     PTT 30 seconds     CBC and Platelet [[id number]]  (Abnormal) Collected: 06/17/25 0015    Lab Status: Final result Specimen: Blood from Arm, Right Updated: 06/17/25 0032     WBC 6.97 Thousand/uL      RBC 3.33 Million/uL      Hemoglobin 10.7 g/dL      Hematocrit 31.3 %      MCV 94 fL      MCH 32.1 pg      MCHC 34.2 g/dL      RDW 13.5 %      Platelets 216 Thousands/uL      MPV 8.8 fL     Fingerstick Glucose (POCT) [[id number]]  (Normal) Collected: 06/16/25 2353    Lab Status: Final result Specimen: Blood Updated: 06/16/25 2354     POC Glucose 108 mg/dl             CTA stroke alert (head/neck)   Final Interpretation by Mitchell Westfall DO (06/17 0031)   Approximately 50% right cervical internal carotid artery stenosis by NASCET criteria.   No evidence of large intracranial aneurysm, high-grade stenosis, or large vessel occlusion.   Redemonstrated acute intracranial hemorrhage, also seen on concurrent noncontrast CT head.   I discussed this study via [employer] secure chat with Dr. Stefan Child on  6/17/2024 12:16 AM         Workstation performed: [id number]         CT stroke alert brain   Final Interpretation by Mitchell Westfall DO (06/17 0020)   New small area of presumed intraparenchymal hemorrhage  measuring up to 8 mm in the left posterior internal capsule region.   I discussed this study via [email or web address] secure chat with Dr. Stefan Child on  6/17/2024 12:16 AM            Workstation performed: [id number]         X-ray chest 1 view portable    (Results Pending)   CT head wo contrast    (Results Pending)   MRI inpatient order    (Results Pending)   VAS carotid limited study    (Results Pending)       Procedures    ED Medication and Procedure Management   Prior to Admission Medications   Prescriptions Last Dose Informant Patient Reported? Taking?   docusate sodium (COLACE) 100 mg capsule   No No   Sig: Take 1 capsule (100 mg total) by mouth 2 (two) times a day   Patient not taking: Reported on 6/6/2025   furosemide (LASIX) 20 mg tablet Not Taking Self Yes No   Sig: Take 20 mg by mouth   Patient not taking: Reported on 6/6/2025   pantoprazole (PROTONIX) 40 mg tablet   No No   Sig: Take 1 tablet (40 mg total) by mouth daily in the early morning   Patient not taking: Reported on 6/6/2025   potassium chloride (MICRO-K) 10 MEQ CR capsule   No No   Sig: Take 1 capsule (10 mEq total) by mouth daily for 15 days   Patient not taking: Reported on 6/6/2025   vitamin B-12 (VITAMIN B-12) 1,000 mcg tablet   No No   Sig: Take 1 tablet (1,000 mcg total) by mouth daily   Patient not taking: Reported on 6/6/2025      Facility-Administered Medications: None     Current Discharge Medication List        CONTINUE these medications which have NOT CHANGED    Details   docusate sodium (COLACE) 100 mg capsule Take 1 capsule (100 mg total) by mouth 2 (two) times a day  Qty: 60 capsule, Refills: 0    Associated Diagnoses: Colonic mass      furosemide (LASIX) 20 mg tablet Take 20 mg by mouth      pantoprazole (PROTONIX) 40 mg tablet Take 1 tablet (40 mg total) by mouth daily in the early morning  Qty: 30 tablet, Refills: 0    Associated Diagnoses: Colonic mass      potassium chloride (MICRO-K) 10 MEQ CR capsule Take 1 capsule (10 mEq total) by mouth  daily for 15 days  Qty: 15 capsule, Refills: 0    Associated Diagnoses: Colonic mass; Severe protein-calorie malnutrition (HCC)      vitamin B-12 (VITAMIN B-12) 1,000 mcg tablet Take 1 tablet (1,000 mcg total) by mouth daily  Qty: 30 tablet, Refills: 0    Associated Diagnoses: Colonic mass           No discharge procedures on file.  ED SEPSIS DOCUMENTATION   Time reflects when diagnosis was documented in both MDM as applicable and the Disposition within this note       Time User Action Codes Description Comment    6/16/2025 11:50 PM Rylan Rich [R29.90] Stroke-like symptoms     6/17/2025 12:44 AM Abdi Harrison [I62.9] Intracranial bleed (HCC)     6/17/2025 12:44 AM Abdi Harrison Modify [R29.90] Stroke-like symptoms     6/17/2025 12:44 AM Abdi Harrison Modify [I62.9] Intracranial bleed (HCC)     6/17/2025  2:47 AM Santiago Zuniga [I62.9] Intracranial hemorrhage (HCC)                    Abdi Harrison MD  06/17/25 0775         [1]   Past Medical History:  Diagnosis Date    Colon cancer (HCC)     GERD (gastroesophageal reflux disease)    [2]   Past Surgical History:  Procedure Laterality Date    EXPLORATORY LAPAROTOMY W/ BOWEL RESECTION N/A 4/5/2022    Procedure: LAPAROTOMY EXPLORATORY, RIGHT DWAYNE COLECTOMY WITH ANASTOMOSIS;  Surgeon: Amy Parker DO;  Location: [facility];  Service: General    HERNIA REPAIR     [3] No family history on file.  [4]   Social History  Tobacco Use    Smoking status: Never    Smokeless tobacco: Never   Vaping Use    Vaping status: Never Used   Substance Use Topics    Alcohol use: Not Currently    Drug use: Never        Abdi Harrison MD  06/17/25 3224

## 2025-06-17 NOTE — CASE MANAGEMENT
Case Management Assessment & Discharge Planning Note    Patient name Maximino Diaz Sr.  Location /-01 MRN [id number]  : 1940 Date 2025       Current Admission Date: 2025  Current Admission Diagnosis:Intracranial hemorrhage (HCC)   Patient Active Problem List    Diagnosis Date Noted    Stenosis of right carotid artery 2025    Intracranial hemorrhage (HCC) 2025    Stroke-like symptoms 2025    Dysphagia 2025    Venous stasis dermatitis of both lower extremities 2025    History of colon cancer 2024    Chronic kidney disease, stage 3a (HCC) 2023    History of hemicolectomy 2022    Tinea corporis 2022    Malnutrition (HCC) 2022    Severe protein-calorie malnutrition (HCC) 2022    Colonic mass     Small bowel obstruction (HCC) 2022    Acute on chronic anemia 2022    JENSEN (acute kidney injury) (HCC) 2022      LOS (days): 0  Geometric Mean LOS (GMLOS) (days): 4.5  Days to GMLOS:3.9     OBJECTIVE:    Risk of Unplanned Readmission Score: 13.75         Current admission status: Inpatient  Referral Reason: Other (stroke protocol)    Preferred Pharmacy:   [facility]/pharmacy #[id number] - [place], [place] - [address]  [address] [place] [address]  Phone: [phone] Fax: [phone]    Primary Care Provider: Daniel Martins DO    Primary Insurance: [employer]  Secondary Insurance:       CM met with Maximino, the son and Yolanda his daughter,baseline information  was obtained. CM discussed the role of CM in helping the patient develop a discharge plan and assist the patient in carry out their plan.      ASSESSMENT:  Active Health Care Proxies    There are no active Health Care Proxies on file.       Advance Directives  Does patient have a Health Care POA?: Yes  Does patient have Advance Directives?: No  Was patient offered paperwork?: No  Primary Contact:  Maximino Diaz Jr (Son)  [phone]         Readmission Root Cause  30 Day Readmission: No    Patient Information  Admitted from:: Home  Mental Status: Other (Comment) (sound asleep)  During Assessment patient was accompanied by: Son, Daughter  Assessment information provided by:: Son, Daughter  Primary Caregiver: Family  Support Systems: Son, Daughter  [facility] Residence: [facility]  What city do you live in?: Normally lives in [place] with his son, however since he fell down the steps, he has been staying with his daughter in [place] in a 1st story home- ( since June 1st)  Home entry access options. Select all that apply.: Stairs  Number of steps to enter home.: 2  Do the steps have railings?: Yes  Type of Current Residence: 2 story home  Upon entering residence, is there a bedroom on the main floor (no further steps)?: No  A bedroom is located on the following floor levels of residence (select all that apply):: 2nd Floor  Upon entering residence, is there a bathroom on the main floor (no further steps)?: No  Indicate which floors of current residence have a bathroom (select all the apply):: 2nd Floor  Number of steps to 2nd floor from main floor: One Flight  Living Arrangements: Lives w/ Son  Is patient a ?: No    Activities of Daily Living Prior to Admission  Functional Status: Assistance  Completes ADLs independently?: No  Level of ADL dependence: Assistance  Ambulates independently?: No  Level of ambulatory dependence: Assistance  Does patient use assisted devices?: Yes  Assisted Devices (DME) used: Walker, Bedside Commode  Does patient currently own DME?: No  Does patient have a history of Outpatient Therapy (PT/OT)?: No  Does the patient have a history of Short-Term Rehab?: No  Does patient have a history of HHC?: Yes ([facility])  Does patient currently have HHC?: No         Patient Information Continued  Income Source: Pension/skilled nursing  Does patient have prescription coverage?:  Yes  Can the patient afford their medications and any related supplies (such as glucometers or test strips)?: Yes  Does patient receive dialysis treatments?: No  Does patient have a history of substance abuse?: No  Does patient have a history of Mental Health Diagnosis?: No         Means of Transportation  [facility]      Social Determinants of Health (SDOH)      Flowsheet Row Most Recent Value   Housing Stability    In the last 12 months, was there a time when you were not able to pay the mortgage or rent on time? N   In the past 12 months, how many times have you moved where you were living? 0   At any time in the past 12 months, were you homeless or living in a shelter (including now)? N   Transportation Needs    In the past 12 months, has lack of transportation kept you from medical appointments or from getting medications? no   In the past 12 months, has lack of transportation kept you from meetings, work, or from getting things needed for daily living? No   Food Insecurity    Within the past 12 months, you worried that your food would run out before you got the money to buy more. Never true   Within the past 12 months, the food you bought just didn't last and you didn't have money to get more. Never true   Utilities    In the past 12 months has the electric, gas, oil, or water company threatened to shut off services in your home? No          Identified Mr Diaz, resides with his son in [place], however since June 1st he has been staying with his daughter, [address] [place] since hsi fall down the step.  All ADLS are done on the 1st floor at Yolanda's home.  Pt normally sleeps on the 1st floor, uses a BSC but does go upstairs for showers.  Pt has 3 children.     DISCHARGE DETAILS:    Discharge planning discussed with:: son Maximino and daughter Yolanda  Freedom of Choice: Yes  Comments - Freedom of Choice: Discussed rehab, discussed acute and STR at  Skilled facility. Presently both feel that STR would be appropriate as pt would not be able ot do 3 hours of therapy per day.  CM contacted family/caregiver?: Yes  Were Treatment Team discharge recommendations reviewed with patient/caregiver?: Yes          Contacts  Patient Contacts: son Maximino and daughter Yolanda  Relationship to Patient:: Family  Contact Method: In Person    Requested Home Health Care         Is the patient interested in C at discharge?: No    DME Referral Provided  Referral made for DME?: No    Other Referral/Resources/Interventions Provided:  Interventions: Short Term Rehab  Referral Comments: [place] referral for STR was made- currently pending acceptance         Treatment Team Recommendation: Short Term Rehab  Expected Discharge Disposition: Skilled Nursing Facility     Transport at Discharge : BLS Ambulance

## 2025-06-17 NOTE — SPEECH THERAPY NOTE
Speech-Language Pathology Bedside Swallow Evaluation      Patient Name: Maximino Diaz Sr.  Today's Date: 6/17/2025     Problem List  Principal Problem:    Intracranial hemorrhage (HCC)  Active Problems:    Severe protein-calorie malnutrition (HCC)    Stenosis of right carotid artery    Chronic kidney disease, stage 3a (HCC)    Stroke-like symptoms    Past Medical History  Past Medical History[1]    Past Surgical History  Past Surgical History[2]    Summary:  Pt presented with s/s suggestive of severe oropharyngeal dysphagia with mentation/cognition contributing to symptoms in bedside evaluation.  Per NSG, completed extensive oral care this am as patient with limited dentition difficult to visualize 2' secretions/debris.  Suctioned large amounts of thick brown secretions from back of throat, elicited gag for patient.  Patient continues to present with wet congested vocal quality, vocal quality is very soft.  Does not demonstrate ability to manage oral secretions, found pooled in buccal cavities on arrival.  Pt able to participate in some instances, short one word-two word answers.  Additional oral care completed by SLP with suction toothbrush, pt found to have bilateral buccal cavity secretions and thin clear secretions closer to faucial pillars.  Pt does gag upon deeper suction for additional secretions.  Cued patient for elicited cough, cough is very weak, wet, incomplete and non productive.  Cough is very effortful.  Patient is unable to elicit swallow despite verbal and tactile cues.  Suspecting oral cavity too dry for swallow initiation- ice chip placed on tongue, pt does not attempt to manipulate, does not close/move lips, does not move tongue.  Ice chip melting despite cues, ice chip removed, patient suctioned.  Attempted again to encourage an elicited swallow.  Patient demonstrates minimal tongue movement/fasciculation but brief, does not demonstrate a second time.  Patient is unable to elicit swallow  mechanism for further evaluation of its function.  Patient will require ongoing suction as he is not managing his own secretions at this time.  Patient did not demonstrate cough during evaluation.  CT imaging repeated with no intracranial change, MRI pending this afternoon.  EEG completed.    Risk/s for Aspiration:  Present if oral secretions are not frequently suctioned from buccal cavities and throat    Recommended Diet: NPO   Recommended Form of Meds: non-oral if possible   Aspiration precautions and swallowing strategies:  Patient will remain NPO at this time, no strategies applicable  Other Recommendations: Continue frequent oral care and frequent suction of oral cavity    Current Medical Status  Pt is a 85 y.o. male who presents with a PMH of severe protein malnutrition, CKD, SBO, chronic anemia and colon cancer from home with a LWKT of 2130 on the evening of 6/16.  Patient was found by family with garbled speech, lethargy and confusion. A stroke alert was initiated in the ED and the patient was found to have an 8 mm intraparenchymal hemorrhage in the left posterior internal capsule.  NCC at [facility] was contacted.  The family stated to the ED physician that they have not decided on a code status but they would not opt for surgical intervention if it were needed. [facility] at [facility] agreed to admission.     Current Precautions:  Fall  Aspiration  Allergies:  No known food allergies  Past medical history:  Please see H&P for details    Special Studies:  CTA Stroke Alert 6/16/25 IMPRESSION:  Approximately 50% right cervical internal carotid artery stenosis by NASCET criteria.  No evidence of large intracranial aneurysm, high-grade stenosis, or large vessel occlusion.  Redemonstrated acute intracranial hemorrhage, also seen on concurrent noncontrast CT head.  CT head wo contrast 6/17/25 IMPRESSION:  No significant change in the size or appearance of the left gangliocapsular intraparenchymal hemorrhage.  No new midline shift or  "mass effect. No new areas of hemorrhage.    Social/Education/Vocational Hx:  Pt lives with family    Swallow Information   Current Risks for Dysphagia & Aspiration: CVA, AMS, and decreased alertness  Current Symptoms/Concerns: wet vocal quality and large secretions requiring suction  Current Diet: NPO   Baseline Diet: regular diet and thin liquids    Baseline Assessment   Behavior/Cognition: waxing and waning arousal level  Speech/Language Status: able to follow commands inconsistently and limited verbal output  Patient Positioning: upright in bed  Pain Status/Interventions/Response to Interventions:  No report of or nonverbal indications of pain.    Swallow Mechanism Exam  Facial: symmetrical  Labial: decreased strength and decreased coordination  Lingual: decreased ROM, bilateral decreased strength, and decreased coordination  Velum: unable to visualize  Mandible:  decreased ROM  Dentition: limited dentition and poor oral hygiene  Vocal quality:breathy, weak, and gurgly   Volitional Cough: weak   Respiratory Status:  on 2L O2 NC    Consistencies Assessed and Performance   Consistencies Administered: ice chips  Materials administered included single ice chip placed orally with no manipulation despite loud cues, ice chip melting, suctioned from oral cavity    Oral Stage: absent    Pharyngeal Stage: absent    Esophageal Concerns: none reported    Strategies and Efficacy:  ST to attempt further PO trials    Summary and Recommendations (see above)  Results Reviewed with: RN and MD   Treatment Recommended:  Yes   Frequency of treatment:  2-3x to determine eligibility/appropriateness of PO diet    Patient Stated Goal:  \"Good you?\"    Dysphagia LTG  -Patient will demonstrate safe and effective oral intake (without overt s/s significant oral/pharyngeal dysphagia including s/s penetration or aspiration) for the highest appropriate diet level.     Short Term Goals:  -Patient will tolerate trials of ice chips with no " significant s/s of oral or pharyngeal dysphagia including aspiration across 1-3 diagnostic sessions.  -Patient will demonstrate tolerance of secretions >50% of all instances where swallow is required.  -Patient will elicit swallow in therapy to determine appropriateness of PO initiation.  -Patient’s caregiver will demonstrate adherence to recommended diet, as well as application of aspiration precautions and compensatory strategies if/when it becomes applicable.    Speech Therapy Prognosis   Prognosis: poor  Prognosis Considerations: medical status, medically fragile status, therapeutic potential, and swallow could not be elicited by patient/absent during evaluation       [1]   Past Medical History:  Diagnosis Date    Colon cancer (HCC)     GERD (gastroesophageal reflux disease)    [2]   Past Surgical History:  Procedure Laterality Date    EXPLORATORY LAPAROTOMY W/ BOWEL RESECTION N/A 4/5/2022    Procedure: LAPAROTOMY EXPLORATORY, RIGHT DWAYNE COLECTOMY WITH ANASTOMOSIS;  Surgeon: Amy Parker DO;  Location:  MAIN OR;  Service: General    HERNIA REPAIR

## 2025-06-17 NOTE — CONSULTS
Consultation - Neurology   Name: Maximino Diaz Sr. 85 y.o. male I MRN: [id number]  Unit/Bed#: -01 I Date of Admission: 6/16/2025   Date of Service: 6/17/2025 I Hospital Day: 0   Inpatient consult to Neurology  Consult performed by: Mike Reed MD  Consult ordered by: Santiago Zuniga Jr., PA-C      Consult to Neurology  Consult performed by: Mike Reed MD  Consult ordered by: Rylan Rich PA-C        Physician Requesting Evaluation: Tonya Carlson MD   Reason for Evaluation / Principal Problem: ICH    Assessment & Plan  Intracranial hemorrhage (HCC)  Maximino Diaz Sr. is a 85 y.o.  male with severe protein malnutrition, CKD, SBO, chronic anemia and colon cancer who presents as stroke alert for lethargy, confusion and speech issue. LKW: 2130. NIHSS=7  for  aphasia, RLE drift per ED exam. CTH with small ICH, ICH score 1.  GOC discussion per ICU and ED mentions no aggressive intervention so transferred not done.     - No TNK given as CT with hemorrhage  - Obtain MRI brain with and without contrast (known colon cancer history) . Repeat CTH at 24 hours if MRI not completed.   - Obtain routine EEG. Family  on signs and symptoms of seizures.   - HOB 30 degrees, monitor for increased ICP  - Strict blood pressure control, goal -140, prn labetalol (nicardipine gtt)  - no antiplatelets or anticoagulation, check coagulation factors  - PT/OT/speech  - Stat CT if any changes in neurological status  - NPO until bedside swallow, then low Na, low Chol diet  - DVT ppx: SCDs for 24h then consider DVT ppx if ICH stable  - Seizure precautions given   - Ensure K>4, Mg> 2 and Ca>9  - Ativan 2mg IV PRN for generalized seizures lasting > 2 minutes or > 3 partial seizures in 1h without return to baseline  - Avoid epileptogenic medications such as wellbutrin, cefepime, ultram, quinolones, and imipenum      Stenosis of right carotid artery  Carotidd US pending   Chronic kidney disease,  stage 3a (HCC)  Lab Results   Component Value Date    EGFR 45 06/17/2025    EGFR 40 06/17/2025    EGFR 38 06/01/2025    CREATININE 1.42 (H) 06/17/2025    CREATININE 1.56 (H) 06/17/2025    CREATININE 1.60 (H) 06/01/2025     Stroke-like symptoms  See above       Maximino Diaz Sr. will need follow up in in 6 weeks with neurovascular attending or advance practitioner. He will not require outpatient neurological testing.    History of Present Illness   Maximino Diaz Sr. is a 85 y.o.  male with severe protein malnutrition, CKD, SBO, chronic anemia and colon cancer who presents as stroke alert for lethargy, confusion and speech issue. LKW: 2130. NIHSS=7  for  aphasia, RLE drift per ED exam. CTH with small ICH, ICH score 1.   GOC discussion per ICU and ED mentions no aggressive intervention so transferred not done.     Two weeks ago, he felt down a flight of stairs and had a fracture of pinky finger and skin tears on his head. His left index finger and right middle finger bruises. Son at bedside lives with pt. He has been more dependent lately.     Review of Systems   Unable to obtain due to mental status .       Medical History Review: I have reviewed the patient's PMH, PSH, Social History, Family History, Meds, and Allergies   Historical Information   Past Medical History[1]  Past Surgical History[2]  Social History[3]  E-Cigarette/Vaping    E-Cigarette Use Never User      E-Cigarette/Vaping Substances     Family History[4]  Social History[5]    Current Facility-Administered Medications:     acetaminophen (TYLENOL) oral suspension 650 mg, Q6H PRN    chlorhexidine (PERIDEX) 0.12 % oral rinse 15 mL, Q12H ED    labetalol (NORMODYNE) injection 10 mg, Q4H PRN  Prior to Admission Medications   Prescriptions Last Dose Informant Patient Reported? Taking?   docusate sodium (COLACE) 100 mg capsule   No No   Sig: Take 1 capsule (100 mg total) by mouth 2 (two) times a day   Patient not taking: Reported on 6/6/2025    furosemide (LASIX) 20 mg tablet Not Taking Self Yes No   Sig: Take 20 mg by mouth   Patient not taking: Reported on 6/6/2025   pantoprazole (PROTONIX) 40 mg tablet   No No   Sig: Take 1 tablet (40 mg total) by mouth daily in the early morning   Patient not taking: Reported on 6/6/2025   potassium chloride (MICRO-K) 10 MEQ CR capsule   No No   Sig: Take 1 capsule (10 mEq total) by mouth daily for 15 days   Patient not taking: Reported on 6/6/2025   vitamin B-12 (VITAMIN B-12) 1,000 mcg tablet   No No   Sig: Take 1 tablet (1,000 mcg total) by mouth daily   Patient not taking: Reported on 6/6/2025      Facility-Administered Medications: None     Patient has no known allergies.    Objective :  Temp:  [97.8 °F (36.6 °C)-100 °F (37.8 °C)] 97.8 °F (36.6 °C)  HR:  [] 73  BP: ()/() 94/52  Resp:  [13-32] 13  SpO2:  [94 %-97 %] 97 %  O2 Device: None (Room air)    Physical Exam  Modified PE as this is a video consultation:  Gen:   NAD.  HEENT:  No Septal deviation EOMI NCAT.  Resp:  Symmetric chest rise and patient in no obvious respiratory distress  MSK: ROM normal  Skin: No rash noted in visualized portion of this exam    Neurological Exam  Awake to voice, lethargy. Able to comprehend, but minimal speech and unable to speak full sentences.    Midline gaze,  left face appear droopy but also laying on left.  no tongue deviation, symmetric shoulder shrug and side bending and neck rotation  Motor:  able to move his arms but constantly drift to bed . Not moving his leg much. Hand tremor in postural   Sensation:  aware of touch from son.   Cerebellar: unable to test due to mental status  Gait:  deferred         Lab Results: I have reviewed the following results:I have personally reviewed pertinent reports.  , BMP/CMP:   Results from last 7 days   Lab Units 06/17/25  0448 06/17/25  0015   SODIUM mmol/L 139 140   POTASSIUM mmol/L 3.3* 3.5   CHLORIDE mmol/L 105 105   CO2 mmol/L 28 28   BUN mg/dL 30* 36*    CREATININE mg/dL 1.42* 1.56*   CALCIUM mg/dL 8.8 8.9   AST U/L  --  27   ALT U/L  --  22   ALK PHOS U/L  --  99   EGFR ml/min/1.73sq m 45 40   , HgBA1C:   , Coagulation:   Results from last 7 days   Lab Units 06/17/25  0448   INR  1.06   , Lipid Profile:     Recent Labs     06/17/25 0448   WBC 6.70   HGB 10.3*   HCT 29.9*      SODIUM 139   K 3.3*      CO2 28   BUN 30*   CREATININE 1.42*   GLUC 106   CAIONIZED 1.08*   MG 2.1   PHOS 2.6     Imaging Results Review: I personally reviewed the following image studies in PACS and associated radiology reports: CTA head and neck and CT head. My interpretation of the radiology images/reports is: as noted below .    CT head wo contrast   Final Result by Alfredo Romero MD (06/17 0841)      No significant change in the size or appearance of the left gangliocapsular intraparenchymal hemorrhage.      No new midline shift or mass effect. No new areas of hemorrhage.                  Workstation performed: [id number]         CTA stroke alert (head/neck)   Final Result by Mitchell Westfall DO (06/17 0031)   Approximately 50% right cervical internal carotid artery stenosis by NASCET criteria.   No evidence of large intracranial aneurysm, high-grade stenosis, or large vessel occlusion.   Redemonstrated acute intracranial hemorrhage, also seen on concurrent noncontrast CT head.   I discussed this study via [employer] secure chat with Dr. Stefan Child on  6/17/2024 12:16 AM         Workstation performed: [id number]         CT stroke alert brain   Final Result by Mitchell Westfall DO (06/17 0020)   New small area of presumed intraparenchymal hemorrhage measuring up to 8 mm in the left posterior internal capsule region.   I discussed this study via [employer] secure chat with Dr. Stefan Child on  6/17/2024 12:16 AM            Workstation performed: [id number]         X-ray chest 1 view portable    (Results Pending)   MRI inpatient order    (Results Pending)   VAS carotid complete study     (Results Pending)           VTE Prophylaxis: VTE covered by:    None       Administrative Statements   VIRTUAL CARE DOCUMENTATION:     1. This service was provided via Telemedicine using "[employer]" TV Kit     2. Parties in the room with patient during teleconsult Family member: Son     3. Confidentiality My office door was closed     4. Participants No one else was in the room    5. Patient acknowledged consent and understanding of privacy and security of the  Telemedicine consult. I informed the patient that I have reviewed their record in Epic and presented the opportunity for them to ask any questions regarding the visit today.  The patient agreed to participate.    6. I have spent a total time of 55 minutes in caring for this patient on the day of the visit/encounter including Diagnostic results, Prognosis, Risks and benefits of tx options, Instructions for management, Patient and family education, Risk factor reductions, Impressions, and Documenting in the medical record, not including the time spent for establishing the audio/video connection.           [1]   Past Medical History:  Diagnosis Date    Colon cancer (HCC)     GERD (gastroesophageal reflux disease)    [2]   Past Surgical History:  Procedure Laterality Date    EXPLORATORY LAPAROTOMY W/ BOWEL RESECTION N/A 4/5/2022    Procedure: LAPAROTOMY EXPLORATORY, RIGHT DWAYNE COLECTOMY WITH ANASTOMOSIS;  Surgeon: Amy Parker DO;  Location:  MAIN OR;  Service: General    HERNIA REPAIR     [3]   Social History  Tobacco Use    Smoking status: Never    Smokeless tobacco: Never   Vaping Use    Vaping status: Never Used   Substance and Sexual Activity    Alcohol use: Not Currently    Drug use: Never   [4] No family history on file.  [5]   Social History  Tobacco Use    Smoking status: Never    Smokeless tobacco: Never   Vaping Use    Vaping status: Never Used   Substance and Sexual Activity    Alcohol use: Not Currently    Drug use: Never

## 2025-06-17 NOTE — H&P
H&P - Critical Care/ICU   Name: Maximino Diaz Sr. 85 y.o. male I MRN: [id number]  Unit/Bed#: ED 01 I Date of Admission: 6/16/2025   Date of Service: 6/17/2025 I Hospital Day: 0       Assessment & Plan  Intracranial hemorrhage (HCC)  Patient found by family at home with a LKWT of 2130 on 6/16 with garbled speech, lethargy and confusion.  CT Head: New small area of presumed intraparenchymal hemorrhage measuring up to 8 mm in the left posterior internal capsule region.   Neurology consulted in ED.    Plan:   Neurology Recs:  Recommend admit under stroke/ICH protocol  Keep SBP<140 due to ICH - Labetalol PRN - consider Cardene if BP consistently above systolic 140  Hold AP/AC for now  Repeat CTH in 6 hour or with any acute change  MRI brain with and without contrast routine  Routine EEG when able  Severe protein-calorie malnutrition (HCC)  Malnutrition Findings:                                 BMI Findings:           Body mass index is 20.21 kg/m².     Stenosis of right carotid artery  CTA head/neck: Approximately 50% right cervical internal carotid artery stenosis by NASCET criteria. No evidence of large intracranial aneurysm, high-grade stenosis, or large vessel occlusion.  Carotid US ordered.  Start aspirin/plavix when medically appropriate per Neurology.   Chronic kidney disease, stage 3a (HCC)  Lab Results   Component Value Date    EGFR 40 06/17/2025    EGFR 38 06/01/2025    EGFR 45 (L) 04/09/2025    CREATININE 1.56 (H) 06/17/2025    CREATININE 1.60 (H) 06/01/2025    CREATININE 1.5 (H) 04/09/2025     Appears to be at baseline on admission.    Disposition: Critical care    History of Present Illness   Maximino Diaz Sr. is a 85 y.o. who presents with a PMH of severe protein malnutrition, CKD, SBO, chronic anemia and colon cancer from home with a LWKT of 2130 on the evening of 6/16.  Patient was found by family with garbled speech, lethargy and confusion. A stroke alert was initiated in the ED and the  patient was found to have an 8 mm intraparenchymal hemorrhage in the left posterior internal capsule.  NCC at [facility] was contacted.  The family stated to the ED physician that they have not decided on a code status but they would not opt for surgical intervention if it were needed. CCM at [facility] agreed to admission.     History obtained from chart review and family.    Historical Information   Past Medical History:  No date: Colon cancer (HCC)  No date: GERD (gastroesophageal reflux disease) Past Surgical History:  4/5/2022: EXPLORATORY LAPAROTOMY W/ BOWEL RESECTION; N/A      Comment:  Procedure: LAPAROTOMY EXPLORATORY, RIGHT DWAYNE COLECTOMY                WITH ANASTOMOSIS;  Surgeon: Amy Parker DO;               Location:  MAIN OR;  Service: General  No date: HERNIA REPAIR   Current Outpatient Medications   Medication Instructions    docusate sodium (COLACE) 100 mg, Oral, 2 times daily    furosemide (LASIX) 20 mg    pantoprazole (PROTONIX) 40 mg, Oral, Daily (early morning)    potassium chloride (MICRO-K) 10 MEQ CR capsule 10 mEq, Oral, Daily    vitamin B-12 (VITAMIN B-12) 1,000 mcg, Oral, Daily    Allergies[1]   Social History[2] Family History[3]       Objective :                   Vitals I/O      Most Recent Min/Max in 24hrs   Temp 100 °F (37.8 °C) Temp  Min: 100 °F (37.8 °C)  Max: 100 °F (37.8 °C)   Pulse (!) 115 Pulse  Min: 95  Max: 115   Resp 18 Resp  Min: 16  Max: 18   BP (!) 176/86 BP  Min: 126/74  Max: 176/86   O2 Sat 94 % SpO2  Min: 94 %  Max: 97 %    No intake or output data in the 24 hours ending 06/17/25 0255    Diet NPO    Invasive Monitoring           Physical Exam   Physical Exam  Vitals reviewed.   Eyes:      Extraocular Movements: Extraocular movements intact.      Pupils: Pupils are equal, round, and reactive to light.   Skin:     General: Skin is warm and dry.      Coloration: Skin is not jaundiced or pale.      Findings: Rash (Bilateral lowere extremities.) present.   HENT:      Head:  Normocephalic and atraumatic.      Mouth/Throat:      Mouth: Mucous membranes are dry.   Neck:      Vascular: No JVD.   no midline tenderness Cardiovascular:      Rate and Rhythm: Normal rate and regular rhythm.   Musculoskeletal:         General: No deformity. Normal range of motion.      Right lower leg: No edema.      Left lower leg: No edema.   Abdominal: General: Bowel sounds are normal.      Palpations: Abdomen is soft.   Constitutional:       General: He is not in acute distress.     Appearance: He is well-developed. He is cachectic. He is ill-appearing. He is not diaphoretic.   Pulmonary:      Effort: Pulmonary effort is normal.      Breath sounds: Normal breath sounds.   Neurological:      General: No focal deficit present.      Mental Status: He is alert and oriented to person, place and time.      Comments: Generalized weakness with a slight tremor noted to all 4 extremities.   Genitourinary/Anorectal:  external catheter present.        Diagnostic Studies        Lab Results: I have reviewed the following results:     Medications:  Scheduled PRN   chlorhexidine, 15 mL, Q12H ED      acetaminophen, 650 mg, Q6H PRN  labetalol, 10 mg, Q4H PRN       Continuous          Labs:   CBC    Recent Labs     06/17/25  0015   WBC 6.97   HGB 10.7*   HCT 31.3*        BMP    Recent Labs     06/17/25  0015   SODIUM 140   K 3.5      CO2 28   AGAP 7   BUN 36*   CREATININE 1.56*   CALCIUM 8.9       Coags    Recent Labs     06/17/25  0015   INR 1.04   PTT 30        Additional Electrolytes  Recent Labs     06/17/25  0015   MG 2.3          Blood Gas    No recent results  No recent results LFTs  Recent Labs     06/17/25  0015   ALT 22   AST 27   ALKPHOS 99   ALB 3.7   TBILI 0.64       Infectious  No recent results  Glucose  Recent Labs     06/17/25  0015   GLUC 104        Administrative Statements   I have spent a total time of 60 minutes in caring for this patient on the day of the visit/encounter including  Diagnostic results, Prognosis, Risks and benefits of tx options, Patient and family education, Impressions, Counseling / Coordination of care, Documenting in the medical record, Reviewing/placing orders in the medical record (including tests, medications, and/or procedures), Obtaining or reviewing history  , and Communicating with other healthcare professionals .         [1] No Known Allergies  [2]   Social History  Tobacco Use    Smoking status: Never    Smokeless tobacco: Never   Vaping Use    Vaping status: Never Used   Substance Use Topics    Alcohol use: Not Currently    Drug use: Never   [3] No family history on file.

## 2025-06-17 NOTE — ASSESSMENT & PLAN NOTE
Lab Results   Component Value Date    EGFR 45 06/17/2025    EGFR 40 06/17/2025    EGFR 38 06/01/2025    CREATININE 1.42 (H) 06/17/2025    CREATININE 1.56 (H) 06/17/2025    CREATININE 1.60 (H) 06/01/2025

## 2025-06-17 NOTE — PLAN OF CARE
Problem: PHYSICAL THERAPY ADULT  Goal: Performs mobility at highest level of function for planned discharge setting.  See evaluation for individualized goals.  Description: Treatment/Interventions: Functional transfer training, LE strengthening/ROM, Elevations, Therapeutic exercise, Endurance training, Patient/family training, Cognitive reorientation, Equipment eval/education, Bed mobility, Gait training, Compensatory technique education, Spoke to nursing, OT          See flowsheet documentation for full assessment, interventions and recommendations.  Note: Prognosis: Fair  Problem List: Decreased strength, Decreased endurance, Impaired balance, Decreased mobility, Decreased cognition, Impaired hearing  Assessment: Pt is a 85 y.o. male who [facility] on 6/16/[facility]25 with Intracranial hemorrhage (HCC). Order placed for PT services. Pt seen for PT evaluation on 06/17/25. Refer to comprehensive exam completed above. Upon evaluation: Pt is presenting with functional deficits including decreased strength, decreased endurance, decreased activity tolerance, impaired balance, gait deviations, impaired cognition, impaired memory, difficulty problem solving, decreased safety awareness, impaired judgement, and hearing impairment, requiring  max assistance for bed mobility and min - max assistance for transfers and ambulation with RW . Pt's clinical presentation is currently unstable/unpredictable given functional impairments listed above as well as medical complications of abnormal renal lab values, abnormal CBC, abnormal potassium values, need for input for mobility technique/safety, and polypharmacy. Pt's PMHx and comorbidities that may impact pt's participation and functional progress include:CKD and fall(s) history. Personal factors affecting pt that may cause a barrier to progress and discharge include advanced age, need for assistive device, multilevel home environment, inability to  navigate level surfaces without external assistance, inability to ambulate community distances, need for assistance with ADLs, need for assistance with IADLs, and fall risk. Pt's rehab potential is Fair as indicated by barriers and findings above. Current Physical Therapy recommendation at time of discharge is Level II (Moderate Resource Intensity. Pt will benefit from continued skilled PT services to address deficits as defined above with focus on Functional transfer training, LE strengthening/ROM, Elevations, Therapeutic exercise, Endurance training, Patient/family training, Cognitive reorientation, Equipment eval/education, Bed mobility, Gait training, Compensatory technique education, Spoke to nursing, OT to maximize level of functional mobility to facilitate return toward PLOF and improved QOL. Recommend trial with walker as appropriate.  Barriers to Discharge: Other (Comment)  Barriers to Discharge Comments: current assist level for mobility, fall risk, recent fall, medical diagnosis, cognitive deficits  Rehab Resource Intensity Level, PT: II (Moderate Resource Intensity) (PAR)    See flowsheet documentation for full assessment.

## 2025-06-17 NOTE — UTILIZATION REVIEW
Initial Clinical Review    Admission: Date/Time/Statement:   Admission Orders (From admission, onward)       Ordered        06/17/25 0230  INPATIENT ADMISSION  Once                          Orders Placed This Encounter   Procedures    INPATIENT ADMISSION     Standing Status:   Standing     Number of Occurrences:   1     Level of Care:   Critical Care [15]     Estimated length of stay:   More than 2 Midnights     Certification:   I certify that inpatient services are medically necessary for this patient for a duration of greater than two midnights. See H&P and MD Progress Notes for additional information about the patient's course of treatment.     ED Arrival Information       Expected   6/16/2025 23:38    Arrival   6/16/2025 23:49    Acuity   Emergent              Means of arrival   Ambulance    Escorted by   [facility]    Service   Critical Care/ICU    Admission type   Emergency              Arrival complaint   -             Chief Complaint   Patient presents with    STROKE Alert     Pt is normally a+ox4 and independent. Family states last known well was 2130. States they found him with garbled speech and was unable to stand       START OF CARE 6/16/25 @ 2352    Initial Presentation: 85 y.o. male to ED via EMS from home  Present to ED with garbled speech, lethargy and confusion. LWKT of 2130 on the evening of 6/16. A stroke alert was initiated in the ED and the patient was found to have an 8 mm intraparenchymal hemorrhage in the left posterior internal capsule. The family stated to the ED physician that they have not decided on a code status but they would not opt for surgical intervention if it were needed.   PMHX severe protein malnutrition, CKD, SBO, chronic anemia and colon cancer   Admitted to IP - ICU with DX: Intracranial hemorrhage   on exam: GCS 11; T100; tachy; hypertensive; Cr 1.56 (at baseline)  PLAN: VS Q1H; neuro checks; Q1H; Cardiopulmonary monitoring; monitor labs; NPO; neuro consulted; f/u  MRI brain; f/u echo; f/u carotid U/S      Date: 6/17/25      Day 2:   headache, acute confusion, garbled speech.  NIHSS=7  for  aphasia, RLE drift; lethargic. Able to comprehend, but minimal speech and unable to speak full sentences. Midline gaze, left face appear droopy but also laying on left. Motor:  able to move his arms but constantly drift to bed . Not moving his leg much. Hand tremor in postural. K 3.3  Plan: recd labetalol iv x2; recd Ca Gluc iv x1; recd KCL iv x1; VS Q1H; neuro checks; Q1H; Cardiopulmonary monitoring; monitor labs; NPO; f/u MRI brain; f/u echo; f/u carotid U/S      NEURO CONSULT    Intracranial hemorrhage:  CT with hemorrhage therefore no TNK.   Plan: Strict blood pressure control, goal -140; f/u  coagulation factors; PT/ OT / ST eval - tx; NPO until bedside swallow         ED Treatment-Medication Administration from 06/16/2025 4724 to 06/17/2025 0328         Date/Time Order Dose Route Action     06/16/2025 4554 iohexol (OMNIPAQUE) 350 MG/ML injection (MULTI-DOSE) 85 mL 85 mL Intravenous Given     06/17/2025 0223 labetalol (NORMODYNE) injection 10 mg 10 mg Intravenous Given            Scheduled Medications:  chlorhexidine, 15 mL, Mouth/Throat, Q12H ED    calcium gluconate 2 g in sodium chloride 0.9% 100 mL (premix)  Dose: 2 g  Freq: Once Route: IV  Last Dose: 2 g (06/17/25 0516)  Start: 06/17/25 0515 End: 06/17/25 0616    potassium chloride 20 mEq IVPB (premix)  Dose: 20 mEq  Freq: Once Route: IV  Last Dose: 20 mEq (06/17/25 0610)  Start: 06/17/25 0545 End: 06/17/25 0810       Continuous IV Infusions:        PRN Meds:  acetaminophen, 650 mg, Per NG Tube, Q6H PRN  labetalol, 10 mg, Intravenous, Q4H PRN: 6/17 X2      ED Triage Vitals   Temperature Pulse Respirations Blood Pressure SpO2 Pain Score   06/17/25 0030 06/17/25 0000 06/17/25 0000 06/17/25 0000 06/17/25 0000 06/17/25 0400   100 °F (37.8 °C) 96 17 126/74 96 % No Pain     Weight (last 2 days)       Date/Time Weight    06/17/25  0329 52 (114.64)    06/17/25 0000 55.1 (121.47)            Vital Signs (last 3 days)       Date/Time Temp Pulse Resp BP MAP (mmHg) SpO2 O2 Device Patient Position - Orthostatic VS [place] Coma Scale Score Pain    06/17/25 1100 97.8 °F (36.6 °C) 73 13 94/52 67 97 % None (Room air) Lying 15 --    06/17/25 1000 -- 83 15 136/86 106 97 % None (Room air) Lying 15 --    06/17/25 0900 -- 80 23 146/76 103 96 % None (Room air) Lying 15 --    06/17/25 0800 -- 73 14 121/69 90 96 % -- -- 15 No Pain    06/17/25 0744 97.9 °F (36.6 °C) 88 32 120/63 86 96 % None (Room air) Lying -- --    06/17/25 0700 -- 77 19 114/68 84 94 % None (Room air) Lying 15 --    06/17/25 0630 -- 77 18 145/102 118 95 % -- -- -- --    06/17/25 0600 -- -- -- -- -- -- -- -- 15 No Pain    06/17/25 0530 -- 72 16 119/56 79 96 % -- -- -- --    06/17/25 0500 -- 84 16 130/62 89 96 % -- -- 15 --    06/17/25 0430 -- 80 16 169/77 111 96 % -- -- -- --    06/17/25 0400 -- 84 17 131/66 88 95 % -- -- 15 No Pain    06/17/25 0330 -- 92 16 146/83 107 95 % -- -- -- --    06/17/25 0329 98.1 °F (36.7 °C) 95 20 113/78 93 95 % None (Room air) Lying -- --    06/17/25 0300 -- 93 18 151/76 -- 94 % None (Room air) Sitting 11 --    06/17/25 0245 -- 93 18 169/105 -- 95 % None (Room air) Sitting 11 --    06/17/25 0230 -- 97 19 163/93 -- 95 % None (Room air) Sitting 11 --    06/17/25 0215 -- 115 18 176/86 -- 94 % None (Room air) Sitting 11 --    06/17/25 0200 -- 106 17 161/80 -- 95 % None (Room air) -- 11 --    06/17/25 0145 -- 103 16 150/102 -- 95 % None (Room air) Sitting 11 --    06/17/25 0130 -- 111 17 170/95 -- 96 % None (Room air) Sitting 11 --    06/17/25 0115 -- 101 17 169/96 -- 96 % None (Room air) Sitting 11 --    06/17/25 0100 -- -- -- -- -- -- -- -- 11 --    06/17/25 0045 -- -- -- -- -- -- -- -- 11 --    06/17/25 0030 100 °F (37.8 °C) -- -- -- -- -- -- -- 11 --    06/17/25 0015 -- 95 16 140/77 -- 97 % None (Room air) Sitting 11 --    06/17/25 0000 -- 96 17 126/74 -- 96 % None  (Room air) Lying 11 --              Pertinent Labs/Diagnostic Test Results:   Radiology:  CT head wo contrast   Final Interpretation by Alfredo Romero MD (06/17 0841)      No significant change in the size or appearance of the left gangliocapsular intraparenchymal hemorrhage.      No new midline shift or mass effect. No new areas of hemorrhage.                  Workstation performed: [id number]         CTA stroke alert (head/neck)   Final Interpretation by Mitchell Westfall DO (06/17 0031)   Approximately 50% right cervical internal carotid artery stenosis by NASCET criteria.   No evidence of large intracranial aneurysm, high-grade stenosis, or large vessel occlusion.   Redemonstrated acute intracranial hemorrhage, also seen on concurrent noncontrast CT head.   I discussed this study via [employer] secure chat with Dr. Stefan Child on  6/17/2024 12:16 AM         Workstation performed: [id number]         CT stroke alert brain   Final Interpretation by Mitchell Westfall DO (06/17 0020)   New small area of presumed intraparenchymal hemorrhage measuring up to 8 mm in the left posterior internal capsule region.   I discussed this study via [employer] secure chat with Dr. Stefan Child on  6/17/2024 12:16 AM            Workstation performed: [id number]         X-ray chest 1 view portable    (Results Pending)   MRI inpatient order    (Results Pending)   VAS carotid complete study    (Results Pending)     Cardiology:  ECG 12 lead   Final Result by Ollie Leung MD (06/17 0724)   Sinus rhythm with Premature atrial complexes   Moderate voltage criteria for LVH, may be normal variant   Borderline ECG   When compared with ECG of 17-Jun-2025 00:11, (unconfirmed)   Premature atrial complexes are now Present   Questionable change in QRS axis   Nonspecific T wave abnormality no longer evident in Inferior leads   Confirmed by Ollie Leung ([id number]) on 6/17/2025 7:34:42 AM      ECG 12 lead   Final Result by Ollie Leung MD (06/17 3157)   Normal sinus rhythm  with sinus arrhythmia   Minimal voltage criteria for LVH, may be normal variant ( Sokolow-Ivan )   Borderline ECG   When compared with ECG of 05-Apr-2022 10:26,   Premature ventricular complexes are no longer Present   Questionable change in QRS axis   Nonspecific T wave abnormality now evident in Inferior leads   Confirmed by Ollie Leung ([id number]) on 6/17/2025 7:41:02 AM          Results from last 7 days   Lab Units 06/17/25 0448 06/17/25  0015   WBC Thousand/uL 6.70 6.97   HEMOGLOBIN g/dL 10.3* 10.7*   HEMATOCRIT % 29.9* 31.3*   PLATELETS Thousands/uL 191 216         Results from last 7 days   Lab Units 06/17/25 0448 06/17/25  0015   SODIUM mmol/L 139 140   POTASSIUM mmol/L 3.3* 3.5   CHLORIDE mmol/L 105 105   CO2 mmol/L 28 28   ANION GAP mmol/L 6 7   BUN mg/dL 30* 36*   CREATININE mg/dL 1.42* 1.56*   EGFR ml/min/1.73sq m 45 40   CALCIUM mg/dL 8.8 8.9   CALCIUM, IONIZED mmol/L 1.08*  --    MAGNESIUM mg/dL 2.1 2.3   PHOSPHORUS mg/dL 2.6  --      Results from last 7 days   Lab Units 06/17/25  0015   AST U/L 27   ALT U/L 22   ALK PHOS U/L 99   TOTAL PROTEIN g/dL 6.8   ALBUMIN g/dL 3.7   TOTAL BILIRUBIN mg/dL 0.64     Results from last 7 days   Lab Units 06/16/25  2353   POC GLUCOSE mg/dl 108     Results from last 7 days   Lab Units 06/17/25 0448 06/17/25  0015   GLUCOSE RANDOM mg/dL 106 104        Results from last 7 days   Lab Units 06/17/25 0448 06/17/25  0223 06/17/25  0015   HS TNI 0HR ng/L  --   --  12   HS TNI 2HR ng/L  --  12  --    HSTNI D2 ng/L  --  0  --    HS TNI 4HR ng/L 14  --   --    HSTNI D4 ng/L 2  --   --          Results from last 7 days   Lab Units 06/17/25 0448 06/17/25  0015   PROTIME seconds 14.2 14.0   INR  1.06 1.04   PTT seconds  --  30        Results from last 7 days   Lab Units 06/17/25  0111   CLARITY UA  Clear   COLOR UA  Yellow   SPEC GRAV UA  1.010   PH UA  6.5   GLUCOSE UA mg/dl Negative   KETONES UA mg/dl Negative   BLOOD UA  Trace-lysed*   PROTEIN UA mg/dl Negative   NITRITE UA   Negative   BILIRUBIN UA  Negative   UROBILINOGEN UA E.U./dl 0.2   LEUKOCYTES UA  Negative   WBC UA /hpf None Seen   RBC UA /hpf 0-1   BACTERIA UA /hpf None Seen   EPITHELIAL CELLS WET PREP /hpf Occasional          Past Medical History[1]  Present on Admission:   Stenosis of right carotid artery   Severe protein-calorie malnutrition (HCC)   Intracranial hemorrhage (HCC)   Chronic kidney disease, stage 3a (HCC)      Admitting Diagnosis: Intracranial hemorrhage (HCC) [I62.9]  Intracranial bleed (HCC) [I62.9]  Stroke-like symptoms [R29.90]  National Institutes of Health (NIH) Stroke Scale level of consciousness score 0, alert; keenly responsive [Z78.9]  Age/Sex: 85 y.o. male    Network Utilization Review Department  ATTENTION: Please call with any questions or concerns to [phone] and carefully listen to the prompts so that you are directed to the right person. All voicemails are confidential.   For Discharge needs, contact Care Management DC Support Team at [phone] opt. 2  Send all requests for admission clinical reviews, approved or denied determinations and any other requests to dedicated fax number below belonging to the campus where the patient is receiving treatment. List of dedicated fax numbers for the Facilities:  FACILITY NAME UR FAX NUMBER   ADMISSION DENIALS (Administrative/Medical Necessity) [phone]   DISCHARGE SUPPORT TEAM (NETWORK) [phone]   PARENT CHILD HEALTH (Maternity/NICU/Pediatrics) [phone]   [facility] [phone]   [facility] [phone]   [facility] [phone]   [facility] [phone]   [facility] [phone]   [facility] [phone]   [facility] [phone]   [facility] [phone]   [facility] [phone]   [facility]  [facility] [phone]   [facility] [phone]   [facility] [phone]              [1]   Past Medical History:  Diagnosis Date    Colon cancer (HCC)     GERD (gastroesophageal reflux disease)

## 2025-06-17 NOTE — PHYSICAL THERAPY NOTE
PHYSICAL THERAPY EVALUATION        NAME:  Maximino Diaz Sr.  DATE: 06/17/25    AGE:   85 y.o.  Mrn:   [id number]  ADMIT DX:  Intracranial hemorrhage (HCC) [I62.9]  Intracranial bleed (HCC) [I62.9]  Stroke-like symptoms [R29.90]  National Institutes of Health (NIH) Stroke Scale level of consciousness score 0, alert; keenly responsive [Z78.9]    Past Medical History[1]  Length Of Stay: 0  Performed at least 2 patient identifiers during session: Name and Birthday        PHYSICAL THERAPY EVALUATION:       06/17/25 0956   PT Last Visit   PT Visit Date 06/17/25   Note Type   Note type Evaluation   Additional Comments MD & Advanced Practitioner confirm pt can be OOB   Pain Assessment   Pain Assessment Tool FLACC   Pain Rating: FLACC (Rest) - Face 0   Pain Rating: FLACC (Rest) - Legs 0   Pain Rating: FLACC (Rest) - Activity 0   Pain Rating: FLACC (Rest) - Cry 0   Pain Rating: FLACC (Rest) - Consolability 0   Score: FLACC (Rest) 0   Pain Rating: FLACC (Activity) - Face 0   Pain Rating: FLACC (Activity) - Legs 0   Pain Rating: FLACC (Activity) - Activity 0   Pain Rating: FLACC (Activity) - Cry 0   Pain Rating: FLACC (Activity) - Consolability 0   Score: FLACC (Activity) 0   Restrictions/Precautions   Weight Bearing Precautions Per Order No   Other Precautions Cognitive;Chair Alarm;Bed Alarm;Fall Risk;Hard of hearing;Telemetry   Home Living   Additional Comments Pt unable to clearly report home living environment   Prior Function   Lives With Son   Falls in the last 6 months   (yes - unclear how many - chart indicates recent fall down stairs at home)   General   Family/Caregiver Present No   Cognition   Overall Cognitive Status Impaired   Orientation Level Oriented to person;Disoriented to place   Memory Unable to assess   Following Commands Follows one step commands with increased time or repetition   Bed Mobility   Supine to Sit 2  Maximal assistance   Additional items Assist x 1;Increased time required;Verbal cues;LE  management  (& trunk mngmnt)   Transfers   Sit to Stand 3  Moderate assistance  (min - mod A)   Additional items Assist x 1;Increased time required;Verbal cues   Stand to Sit 3  Moderate assistance   Additional items Assist x 1;Increased time required;Verbal cues   Stand pivot 3  Moderate assistance  (mod - max A (noted LE scissor))   Additional items Assist x 1;Increased time required;Verbal cues   Additional Comments RW   Ambulation/Elevation   Gait pattern Improper Weight shift;Narrow NESHA;Redundant gait at times;Decreased hip extension  (intermittent scissor with difficulty correcting)   Gait Assistance   (min - mod A)   Additional items Assist x 1;Verbal cues   Assistive Device Rolling walker   Distance 25 ft   Balance   Static Sitting Fair -   Dynamic Sitting Poor +   Static Standing Poor +   Dynamic Standing Poor   Ambulatory Poor   Endurance Deficit   Endurance Deficit Yes   Activity Tolerance   Medical Staff Made Aware OT Izabella   Nurse Made Aware RN Fiona   Assessment   Prognosis Fair   Problem List Decreased strength;Decreased endurance;Impaired balance;Decreased mobility;Decreased cognition;Impaired hearing   Barriers to Discharge Other (Comment)   Barriers to Discharge Comments current assist level for mobility, fall risk, recent fall, medical diagnosis, cognitive deficits   Goals   RUST Expiration Date 07/01/25   PT Treatment Day 0   Plan   Treatment/Interventions Functional transfer training;LE strengthening/ROM;Elevations;Therapeutic exercise;Endurance training;Patient/family training;Cognitive reorientation;Equipment eval/education;Bed mobility;Gait training;Compensatory technique education;Spoke to nursing;OT   PT Frequency 3-5x/wk   Discharge Recommendation   Rehab Resource Intensity Level, PT II (Moderate Resource Intensity)  (PAR)   AM-PAC Basic Mobility Inpatient   Turning in Flat Bed Without Bedrails 2   Lying on Back to Sitting on Edge of Flat Bed Without Bedrails 2   Moving Bed to Chair 2    Standing Up From Chair Using Arms 2   Walk in Room 2   Climb 3-5 Stairs With Railing 1   Basic Mobility Inpatient Raw Score 11   Basic Mobility Standardized Score 30.25   [facility] Highest Level Of Mobility   -HL Goal 4: Move to chair/commode   [facility] Achieved 7: Walk 25 feet or more   End of Consult   Patient Position at End of Consult Supine;Bed/Chair alarm activated;All needs within reach     Pt requires skilled PT/OT co-eval due to medical complexity, safety concerns, fall risk, significant assistance with mobility and/or cognitive-behavioral impairments.    (Please find full objective findings from PT assessment regarding body systems outlined above).     Assessment: Pt is a 85 y.o. male who presented to [facility] on 6/16/2025 with Intracranial hemorrhage (HCC). Order placed for PT services. Pt seen for PT evaluation on 06/17/25. Refer to comprehensive exam completed above. Upon evaluation: Pt is presenting with functional deficits including decreased strength, decreased endurance, decreased activity tolerance, impaired balance, gait deviations, impaired cognition, impaired memory, difficulty problem solving, decreased safety awareness, impaired judgement, and hearing impairment, requiring max assistance for bed mobility and min - max assistance for transfers and ambulation with RW. Pt's clinical presentation is currently unstable/unpredictable given functional impairments listed above as well as medical complications of abnormal renal lab values, abnormal CBC, abnormal potassium values, need for input for mobility technique/safety, and polypharmacy. Pt's PMHx and comorbidities that may impact pt's participation and functional progress include:CKD and fall(s) history. Personal factors affecting pt that may cause a barrier to progress and discharge include advanced age, need for assistive device, multilevel home environment, inability to navigate level surfaces without external  assistance, inability to ambulate community distances, need for assistance with ADLs, need for assistance with IADLs, and fall risk. Pt's rehab potential is Fair as indicated by barriers and findings above. Current Physical Therapy recommendation at time of discharge is Level II (Moderate Resource Intensity. Pt will benefit from continued skilled PT services to address deficits as defined above with focus on Functional transfer training, LE strengthening/ROM, Elevations, Therapeutic exercise, Endurance training, Patient/family training, Cognitive reorientation, Equipment eval/education, Bed mobility, Gait training, Compensatory technique education, Spoke to nursing, OT to maximize level of functional mobility to facilitate return toward PLOF and improved QOL. Recommend trial with walker as appropriate.     The patient's AM-PAC Basic Mobility Inpatient Short Form Raw Score is 11. A Raw score of less than or equal to 16 suggests the patient may benefit from discharge to post-acute rehabilitation services. Please also refer to the recommendation of the Physical Therapist for safe discharge planning.    Goals: Pt will perform bed mobility tasks with min A to reposition in bed and prepare for transfers. Pt will perform transfers with min A to increase Indep in home environment and decrease burden of care and prepare for ambulation. Pt will ambulate with RW for >/= 100 ft with  supervision  to decrease risk for falls, improve activity tolerance, and improve gait quality and to access home environment. Pt will complete 4 steps with unilateral handrail with min A to return to [facility] home.          Zachariah Rojas, PT,DPT         [1]   Past Medical History:  Diagnosis Date    Colon cancer (HCC)     GERD (gastroesophageal reflux disease)

## 2025-06-17 NOTE — PLAN OF CARE
Problem: PAIN - ADULT  Goal: Verbalizes/displays adequate comfort level or baseline comfort level  Description: Interventions:  - Encourage patient to monitor pain and request assistance  - Assess pain using appropriate pain scale  - Administer analgesics as ordered based on type and severity of pain and evaluate response  - Implement non-pharmacological measures as appropriate and evaluate response  - Consider cultural and social influences on pain and pain management  - Notify physician/advanced practitioner if interventions unsuccessful or patient reports new pain  - Educate patient/family on pain management process including their role and importance of  reporting pain   - Provide non-pharmacologic/complimentary pain relief interventions  6/17/2025 0753 by Fiona Hillman RN  Outcome: Progressing  6/17/2025 0748 by Fiona Hillman RN  Outcome: Progressing     Problem: INFECTION - ADULT  Goal: Absence or prevention of progression during hospitalization  Description: INTERVENTIONS:  - Assess and monitor for signs and symptoms of infection  - Monitor lab/diagnostic results  - Monitor all insertion sites, i.e. indwelling lines, tubes, and drains  - Monitor endotracheal if appropriate and nasal secretions for changes in amount and color  - Kulm appropriate cooling/warming therapies per order  - Administer medications as ordered  - Instruct and encourage patient and family to use good hand hygiene technique  - Identify and instruct in appropriate isolation precautions for identified infection/condition  6/17/2025 0753 by Fiona Hillman RN  Outcome: Progressing  6/17/2025 0748 by Fiona Hillman RN  Outcome: Progressing  Goal: Absence of fever/infection during neutropenic period  Description: INTERVENTIONS:  - Monitor WBC  - Perform strict hand hygiene  - Limit to healthy visitors only  - No plants, dried, fresh or silk flowers with moser in patient room  6/17/2025 0753 by Fiona Hillman RN  Outcome:  Progressing  6/17/2025 0748 by Fiona Hillman RN  Outcome: Progressing     Problem: SAFETY ADULT  Goal: Patient will remain free of falls  Description: INTERVENTIONS:  - Educate patient/family on patient safety including physical limitations  - Instruct patient to call for assistance with activity   - Consider consulting OT/PT to assist with strengthening/mobility based on AM PAC & JH-HLM score  - Consult OT/PT to assist with strengthening/mobility   - Keep Call bell within reach  - Keep bed low and locked with side rails adjusted as appropriate  - Keep care items and personal belongings within reach  - Initiate and maintain comfort rounds  - Make Fall Risk Sign visible to staff  - Offer Toileting every 2 Hours, in advance of need  - Initiate/Maintain bed alarm  - Obtain necessary fall risk management equipment:   - Apply yellow socks and bracelet for high fall risk patients  - Consider moving patient to room near nurses station  6/17/2025 0753 by Fiona Hillman RN  Outcome: Progressing  6/17/2025 0748 by Fiona Hillman RN  Outcome: Progressing  Goal: Maintain or return to baseline ADL function  Description: INTERVENTIONS:  -  Assess patient's ability to carry out ADLs; assess patient's baseline for ADL function and identify physical deficits which impact ability to perform ADLs (bathing, care of mouth/teeth, toileting, grooming, dressing, etc.)  - Assess/evaluate cause of self-care deficits   - Assess range of motion  - Assess patient's mobility; develop plan if impaired  - Assess patient's need for assistive devices and provide as appropriate  - Encourage maximum independence but intervene and supervise when necessary  - Involve family in performance of ADLs  - Assess for home care needs following discharge   - Consider OT consult to assist with ADL evaluation and planning for discharge  - Provide patient education as appropriate  - Monitor functional capacity and physical performance, use of AM PAC & JH-HLM   -  Monitor gait, balance and fatigue with ambulation    6/17/2025 0753 by Fiona Hillman RN  Outcome: Progressing  6/17/2025 0748 by Fiona Hillman RN  Outcome: Progressing  Goal: Maintains/Returns to pre admission functional level  Description: INTERVENTIONS:  - Perform AM-PAC 6 Click Basic Mobility/ Daily Activity assessment daily.  - Set and communicate daily mobility goal to care team and patient/family/caregiver.   - Collaborate with rehabilitation services on mobility goals if consulted  - Perform Range of Motion 2 times a day.  - Reposition patient every 2 hours.  - Dangle patient 2 times a day  - Stand patient 2 times a day  - Ambulate patient 2 times a day  - Out of bed to chair 2 times a day   - Out of bed for meals 2 times a day  - Out of bed for toileting  - Record patient progress and toleration of activity level   6/17/2025 0753 by Fiona Hillman RN  Outcome: Progressing  6/17/2025 0748 by Fiona Hillman RN  Outcome: Progressing

## 2025-06-17 NOTE — ACP (ADVANCE CARE PLANNING)
I spoke to the patient's daughter, Yolanda, on the phone and answered all of her questions.  She stated that her brother, Inocencio, would be the decision maker for their father but they would also discuss decisions between them.  I explained to both of them that I understood the decision that surgery would not be an option in accordance with their father's wishes.  They both confirmed that to be true. I explained that if surgery was not an option and the ICH worsened to the point that CPR or ventilator support was needed, then the efforts of resuscitation would be futile.  I discussed prognosis and that time is going to needed before we really understand how he will recover or be able to live on his own again.  I discussed our goals of blood pressure control and frequent neuro checks.  At the end of the discussion, Inocencio decided to make his father a DNR/DNI.  Orders have been placed to reflect Inocencio's decision. The phone number to the ICU was provided.

## 2025-06-17 NOTE — PLAN OF CARE
Problem: OCCUPATIONAL THERAPY ADULT  Goal: Performs self-care activities at highest level of function for planned discharge setting.  See evaluation for individualized goals.  Description: Treatment Interventions: ADL retraining, Functional transfer training, UE strengthening/ROM, Endurance training, Cognitive reorientation, Patient/family training, Neuromuscular reeducation, Equipment evaluation/education, Fine motor coordination activities, Compensatory technique education, Continued evaluation, Energy conservation, Activityengagement, Cardiac education          See flowsheet documentation for full assessment, interventions and recommendations.   Note: Limitation: Decreased ADL status, Decreased UE ROM, Decreased UE strength, Decreased Safe judgement during ADL, Decreased cognition, Decreased endurance, Decreased fine motor control, Decreased self-care trans, Decreased high-level ADLs  Prognosis: Guarded  Assessment: Pt is 85 y.o., male, evaluated at [facility]17/25 due to Intracranial hemorrhage (HCC). OT order placed to assess Pt's ADLs, cognitive status, and performance during functional tasks in order to maximize safety and independence while making most appropriate d/c recommendations. An occupational profile and medical/therapy history were completed, including a extensive review of physical, cognitive, psychosocial history related to pt’s current functional performance. Pt with PMHx impacting their performance during ADL tasks include: colon cancer, GERD, CKD 3a, stenosis of R carotid artery, SBO, chronic anemia.  PT/OT skilled co-evaluation was completed considering low AM-PAC mobility score, medical complexity, multiple comorbidities, and cognitive impairments. Performance deficits/impairments identified at time of initial evaluation, that are impacting Pt's occupational performance and ability to safely return to PLOF, include decreased UB/LB dressing, decreased toileting, decreased UB/LB bathing,  decreased personal hygiene, decreased grooming , decreased financial management, decreased medication management, decreased meal preparation, decreased rest/sleep hygiene, decreased leisure activities, decreased eating/feeding, decreased driving/community mobility, decreased functional household distances, decreased functional community distances, and decreased household cleaning/decreased ROM, decreased strength, decreased endurance, decreased activity tolerance, impaired balance, impaired coordination, impaired skin integrity, impaired cognition, impaired memory, decreased attention, impaired reception of language, impaired expression of language, difficulty problem solving, decreased safety awareness, impaired judgement, incontinence, and involuntary movement. Personal factors such as advanced age, need for assistive device, inaccessible home environment, limited availability of support from family/caregiver, decreased caregiver assistance, inability to ambulate community distances, need for assistance with ADLs, need for assistance with IADLs, frequent falls/fall hi     Rehab Resource Intensity Level, OT: II (Moderate Resource Intensity)

## 2025-06-17 NOTE — TELEMEDICINE
e-Consult (IPC)     Inpatient consult to Neurosurgery  Consult performed by: Eris Hart PA-C  Consult ordered by: FILI Llanes      Contacted by FILI Cortes [facility] ICU.    Maximino Diaz . 85 y.o. male MRN: [id number]  Unit/Bed#: -01 Encounter: [id number]    Reason for Consult    Per provider report, patient presents with garbled speech lethargy and confusion after he was found by his family on the evening of 6/16.  Last known well was approximately 930 that evening.  On presentation to the ER a stroke alert was initiated.  He was found to have a subcentimeter parenchymal hemorrhage in the left posterior internal capsule.  He was admitted to the ICU for monitoring as patient's family did not want any aggressive invasive interventions done.  Repeat CT imaging confirmed stability of parenchymal hemorrhage.  Available past medical history,social history, surgical history, medication list, drug allergies and review of systems were reviewed.    /77   Pulse 87   Temp 97.8 °F (36.6 °C) (Axillary)   Resp 17   Ht 5' (1.524 m)   Wt 52 kg (114 lb 10.2 oz)   SpO2 96%   BMI 22.39 kg/m²      Clinical exam per provider report, awake and voice but lethargic.  Minimal speech and unable to say full sentences.  Possible left facial droop.  Antigravity in the upper extremities but these quickly drift back to the bed.  No purposeful movement noted in his lower extremities.    Imaging personally reviewed.  CT head 6/17/2025 showed no significant change in the size or appearance of the left ganglial capsular intraparenchymal hemorrhage.  No new hemorrhage was noted.  No significant mass effect or midline shift.    Assessment and Recommendations    1.  Continue to monitor neurologic status  2.  Defer stroke care to the neurology service  3.  No surgical intervention indicated at this time  4.  Cleared for pharmacologic DVT prophylaxis  5.  No scheduled follow-up required neurosurgical  sign off.    All questions answered. Provider is in agreement with the course of action. 11-20 minutes, >50% of the total time devoted to medical consultative verbal/EMR discussion between providers. Written report will be generated in the EMR.

## 2025-06-17 NOTE — CASE MANAGEMENT
Case Management Progress Note    Patient name Maximino Diaz Sr.  Location /-01 MRN [id number]  : 1940 Date 2025       LOS (days): 0  Geometric Mean LOS (GMLOS) (days): 4.5  Days to GMLOS:4        OBJECTIVE:        Current admission status: Inpatient  Preferred Pharmacy:   [id number] [place] - [address]  [address]  [facility] [address]  Phone: [phone] Fax: [phone]    Primary Care Provider: Daniel Martins DO    Primary Insurance: [employer] [facility] [employer]  Secondary Insurance:     Case was discussed in multi disciplinary rounds. Attending the rounds were the provider, Nursing, Care Management, and therapy ( OT).   left gangliocapsular intraparenchymal hemorrhage.   Family is not interested in surgical approach.   Follow up imaging to be done.  Speech involved and will follow diet recommendations.   DC recommendation is rehab.  CM will continue to follow.

## 2025-06-17 NOTE — ASSESSMENT & PLAN NOTE
"Patient found by family at home with a LKWT of 2130 on 6/16 with garbled speech, lethargy and confusion.  CT Head: New small area of presumed intraparenchymal hemorrhage measuring up to 8 mm in the left posterior internal capsule region.   Neurology consulted in ED.  Neurosurgery consulted - no intervention    Plan:   Neurology Recs:  Recommend admit under stroke/ICH protocol  Goal -140  Hold AP/AC for now  6/17 Repeat CTH in 6 hour completed - no significant change in the size or appearance of the left gangliocapsular intraparenchymal hemorrhage   6/17 MRI brain with and without contrast routine - \"evolving known left gangliocapsular intraparenchymal hemorrhage\"  Repeat CTH at 24 hour - no new hemorrhage and unchanged in size from 6/17 CT scan  Routine EEG - negative  Seizure precautions  Q4hr neurochecks  "

## 2025-06-17 NOTE — ASSESSMENT & PLAN NOTE
Patient found by family at home with a LKWT of 2130 on 6/16 with garbled speech, lethargy and confusion.  CT Head: New small area of presumed intraparenchymal hemorrhage measuring up to 8 mm in the left posterior internal capsule region.   Neurology consulted in ED.    Plan:   Neurology Recs:  Recommend admit under stroke/ICH protocol  Keep SBP<140 due to ICH - Labetalol PRN - consider Cardene if BP consistently above systolic 140  Hold AP/AC for now  Repeat CTH in 6 hour or with any acute change  MRI brain with and without contrast routine  Routine EEG when able

## 2025-06-18 ENCOUNTER — APPOINTMENT (INPATIENT)
Dept: CT IMAGING | Facility: HOSPITAL | Age: 85
DRG: 064 | End: 2025-06-18
Payer: COMMERCIAL

## 2025-06-18 LAB
ANION GAP SERPL CALCULATED.3IONS-SCNC: 3 MMOL/L (ref 4–13)
ATRIAL RATE: 83 BPM
BUN SERPL-MCNC: 29 MG/DL (ref 5–25)
CALCIUM SERPL-MCNC: 8.8 MG/DL (ref 8.4–10.2)
CHLORIDE SERPL-SCNC: 109 MMOL/L (ref 96–108)
CO2 SERPL-SCNC: 28 MMOL/L (ref 21–32)
CREAT SERPL-MCNC: 1.41 MG/DL (ref 0.6–1.3)
ERYTHROCYTE [DISTWIDTH] IN BLOOD BY AUTOMATED COUNT: 13.6 % (ref 11.6–15.1)
GFR SERPL CREATININE-BSD FRML MDRD: 45 ML/MIN/1.73SQ M
GLUCOSE SERPL-MCNC: 86 MG/DL (ref 65–140)
HCT VFR BLD AUTO: 27.9 % (ref 36.5–49.3)
HGB BLD-MCNC: 9.1 G/DL (ref 12–17)
MAGNESIUM SERPL-MCNC: 2.3 MG/DL (ref 1.9–2.7)
MCH RBC QN AUTO: 31.1 PG (ref 26.8–34.3)
MCHC RBC AUTO-ENTMCNC: 32.6 G/DL (ref 31.4–37.4)
MCV RBC AUTO: 95 FL (ref 82–98)
P AXIS: 74 DEGREES
PLATELET # BLD AUTO: 183 THOUSANDS/UL (ref 149–390)
PMV BLD AUTO: 8.8 FL (ref 8.9–12.7)
POTASSIUM SERPL-SCNC: 3.5 MMOL/L (ref 3.5–5.3)
PR INTERVAL: 146 MS
QRS AXIS: 78 DEGREES
QRSD INTERVAL: 78 MS
QT INTERVAL: 396 MS
QTC INTERVAL: 465 MS
RBC # BLD AUTO: 2.93 MILLION/UL (ref 3.88–5.62)
SODIUM SERPL-SCNC: 140 MMOL/L (ref 135–147)
T WAVE AXIS: 80 DEGREES
VENTRICULAR RATE: 83 BPM
WBC # BLD AUTO: 4.44 THOUSAND/UL (ref 4.31–10.16)

## 2025-06-18 PROCEDURE — 92526 ORAL FUNCTION THERAPY: CPT

## 2025-06-18 PROCEDURE — 93010 ELECTROCARDIOGRAM REPORT: CPT | Performed by: INTERNAL MEDICINE

## 2025-06-18 PROCEDURE — 80048 BASIC METABOLIC PNL TOTAL CA: CPT | Performed by: NURSE PRACTITIONER

## 2025-06-18 PROCEDURE — NC001 PR NO CHARGE: Performed by: INTERNAL MEDICINE

## 2025-06-18 PROCEDURE — 99233 SBSQ HOSP IP/OBS HIGH 50: CPT | Performed by: INTERNAL MEDICINE

## 2025-06-18 PROCEDURE — 70450 CT HEAD/BRAIN W/O DYE: CPT

## 2025-06-18 PROCEDURE — 93005 ELECTROCARDIOGRAM TRACING: CPT

## 2025-06-18 PROCEDURE — 85027 COMPLETE CBC AUTOMATED: CPT | Performed by: NURSE PRACTITIONER

## 2025-06-18 PROCEDURE — 83735 ASSAY OF MAGNESIUM: CPT | Performed by: NURSE PRACTITIONER

## 2025-06-18 RX ORDER — HEPARIN SODIUM 5000 [USP'U]/ML
5000 INJECTION, SOLUTION INTRAVENOUS; SUBCUTANEOUS EVERY 8 HOURS SCHEDULED
Status: DISCONTINUED | OUTPATIENT
Start: 2025-06-18 | End: 2025-06-20

## 2025-06-18 RX ORDER — POTASSIUM CHLORIDE 14.9 MG/ML
20 INJECTION INTRAVENOUS ONCE
Status: COMPLETED | OUTPATIENT
Start: 2025-06-18 | End: 2025-06-18

## 2025-06-18 RX ADMIN — LABETALOL HYDROCHLORIDE 5 MG: 5 INJECTION, SOLUTION INTRAVENOUS at 20:40

## 2025-06-18 RX ADMIN — POTASSIUM CHLORIDE 20 MEQ: 14.9 INJECTION, SOLUTION INTRAVENOUS at 07:46

## 2025-06-18 RX ADMIN — HEPARIN SODIUM 5000 UNITS: 5000 INJECTION INTRAVENOUS; SUBCUTANEOUS at 14:06

## 2025-06-18 RX ADMIN — CHLORHEXIDINE GLUCONATE 15 ML: 1.2 RINSE ORAL at 08:15

## 2025-06-18 RX ADMIN — POTASSIUM CHLORIDE 20 MEQ: 14.9 INJECTION, SOLUTION INTRAVENOUS at 05:55

## 2025-06-18 RX ADMIN — CHLORHEXIDINE GLUCONATE 15 ML: 1.2 RINSE ORAL at 20:45

## 2025-06-18 RX ADMIN — HEPARIN SODIUM 5000 UNITS: 5000 INJECTION INTRAVENOUS; SUBCUTANEOUS at 21:00

## 2025-06-18 NOTE — CASE MANAGEMENT
Case Management Discharge Planning Note    Patient name Maximino Diaz Sr.  Location /-01 MRN [id number]  : 1940 Date 2025       Current Admission Date: 2025  Current Admission Diagnosis:Intracranial hemorrhage (HCC)   Patient Active Problem List    Diagnosis Date Noted    Stenosis of right carotid artery 2025    Intracranial hemorrhage (HCC) 2025    Stroke-like symptoms 2025    Dysphagia 2025    Venous stasis dermatitis of both lower extremities 2025    History of colon cancer 2024    Chronic kidney disease, stage 3a (HCC) 2023    History of hemicolectomy 2022    Tinea corporis 2022    Malnutrition (HCC) 2022    Severe protein-calorie malnutrition (HCC) 2022    Colonic mass     Small bowel obstruction (HCC) 2022    Acute on chronic anemia 2022    JENSEN (acute kidney injury) (HCC) 2022      LOS (days): 1  Geometric Mean LOS (GMLOS) (days): 4.5  Days to GMLOS:3.1     OBJECTIVE:  Risk of Unplanned Readmission Score: 13.78         Current admission status: Inpatient   Preferred Pharmacy:   [id number] - [place], [place] - [address] [facility]  [address]  [facility] [address]  Phone: [phone] Fax: [phone]    Primary Care Provider: Daniel Martins DO    Primary Insurance: [employer] [facility]  Secondary Insurance:     DISCHARGE DETAILS:    Discharge planning discussed with:: Son and daughter  Freedom of Choice: Yes  Comments - Freedom of Choice: Reviewed the list of available STR- choice is to accept [facility]  CM contacted family/caregiver?: Yes  Were Treatment Team discharge recommendations reviewed with patient/caregiver?: Yes  Did patient/caregiver verbalize understanding of patient care needs?: Yes       Contacts  Patient Contacts: Javier  Relationship to Patient:: Family  Contact Method: In Person  Reason/Outcome: Discharge  Planning      Other Referral/Resources/Interventions Provided:  Referral Comments: Secured [facility] for dc       Additional Comments: Will need authorization upon stability    Accepting Facility Name, City & State : [facility]  Receiving Facility/Agency Phone Number: [phone]  Facility/Agency Fax Number: [phone]

## 2025-06-18 NOTE — ASSESSMENT & PLAN NOTE
Malnutrition Findings:                                 BMI Findings:           Body mass index is 22.39 kg/m².      7983

## 2025-06-18 NOTE — PROGRESS NOTES
Progress Note - Critical Care/ICU   Name: Maximino Diaz Sr. 85 y.o. male I MRN: [id number]  Unit/Bed#: -01 I Date of Admission: 6/16/2025   Date of Service: 6/18/2025 I Hospital Day: 1       Critical Care Interval Transfer Note:    Brief Hospital Summary: 85 y.o. with a PMH of severe protein malnutrition, CKD, SBO, chronic anemia and colon cancer, who presented from home with garbled speech, lethargy, and confusion. LWKT of 2130 on the evening of 6/16.  A stroke alert was initiated in the ED and the patient was found to have an 8 mm intraparenchymal hemorrhage in the left posterior internal capsule.  NCC at [facility] was contacted.  The family stated to the ED physician that they have not decided on a code status but they would not opt for surgical intervention if it were needed. Neurology and Neurosurgery consulted. HCT at 6 hour magdalena and 24 hour magdalena completed which showed stable 9 mm hemorrhage in the left corona radiata. NPO on admission due to mental status/aspiration risk. SLP eval 6/17 - NPO. 6/18 mental status improving and SLP will re-eval. VBS ordered.     Barriers to discharge:   SLP/VBS  PT/OT - STR  Stroke pathway       Consults: IP CONSULT TO NEUROLOGY  IP CONSULT TO NEUROCRITICAL CARE  IP CONSULT TO PHYSICAL MEDICINE REHAB  IP CONSULT TO NEUROLOGY  IP CONSULT TO NUTRITION SERVICES  IP CONSULT TO CASE MANAGEMENT  IP CONSULT TO NEUROSURGERY    Recommended to review admission imaging for incidental findings and document in discharge navigator: Chart reviewed, no known incidental findings noted at this time.      Discharge Plan: Anticipate discharge in 24-48 hrs to rehab facility.       Patient seen and evaluated by Critical Care today and deemed to be appropriate for transfer to Med Surg with Telemetry. Spoke to Dr. Maza from [facility] to accept transfer. Critical care can be contacted via SecureChat with any questions or concerns. Please use the Critical Care AP Role in Secure Chat for any provider  inquires until the patient is transferred out of the ICU or until tomorrow at 0600.

## 2025-06-18 NOTE — MALNUTRITION/BMI
This medical record reflects one or more clinical indicators suggestive of malnutrition.    Malnutrition Findings:   Adult Malnutrition type: Chronic illness  Adult Degree of Malnutrition: Other severe protein calorie malnutrition  Malnutrition Characteristics: Fat loss, Muscle loss                360 Statement: Chronic severe malnutrition related to likely inadequate kcal and PRO intake as evidenced by severe muscle loss to temporalis, pectoralis, deltoid, interroseous, quadriceps muscles; severe fat loss to buccals and triceps. Treated with: See consult note for EN recommendations in the event unable to initiate oral diet and medically appropriate/within pt's GOC. Recommend daily weights for nutrition monitoring.    BMI Findings:           Body mass index is 23.12 kg/m².     See Nutrition note dated 6/18/25 for additional details.  Completed nutrition assessment is viewable in the nutrition documentation.

## 2025-06-18 NOTE — UTILIZATION REVIEW
"SEE INITIAL REVIEW AT BOTTOM    Date: 06-18-25  Day 3: Has surpassed a 2nd midnight with active treatments and services.      Continued Stay Review    Date: 06-18-25                          Current Patient Class: Inpatient  Current Level of Care: Medical ICU    HPI:85 y.o. male initially admitted on 06-16-25   Current Diagnosis: Intracranial hemorrhage     Assessment/Plan: Goal -140  Hold AP/AC for now  6/17 Repeat CTH in 6 hour completed - no significant change in the size or appearance of the left gangliocapsular intraparenchymal hemorrhage   6/17 MRI brain with and without contrast routine - \"evolving known left gangliocapsular intraparenchymal hemorrhage\"  Repeat CTH at 24 hour - no new hemorrhage and unchanged in size from 6/17 CT scan Routine EEG - negative  Seizure precautions  Q4hr neuro checks. On exam  Gibberish and garbled speech, can follow commands if given time (lifts up arms and moves feet to command) tremors noted but chronic for patient. Aspiration fall precaution continuous cardio-pulmonary & pulse oximetry, SDD PT/OT as supportive care       Medications:   Scheduled Medications:  chlorhexidine, 15 mL, Mouth/Throat, Q12H ED      Continuous IV Infusions:     PRN Meds:  labetalol, 5 mg, Intravenous, Q4H PRN      Discharge Plan: TBD    Vital Signs (last 3 days)       Date/Time Temp Pulse Resp BP MAP (mmHg) SpO2 O2 Device Patient Position - Orthostatic VS Custer Coma Scale Score Pain    06/18/25 0751 -- -- -- -- -- -- -- -- 14 No Pain    06/18/25 0700 98.2 °F (36.8 °C) 73 13 127/60 86 98 % None (Room air) Lying -- --    06/18/25 0600 -- 64 11 100/54 74 97 % -- -- -- --    06/18/25 0500 -- 65 11 108/56 78 98 % -- -- -- --    06/18/25 0400 -- 71 12 100/55 75 97 % -- -- -- --    06/18/25 0300 -- 64 12 90/54 67 -- -- -- 14 No Pain    06/18/25 0200 -- 62 11 104/57 78 97 % -- -- -- --    06/18/25 0100 -- 69 16 108/57 77 -- -- -- 14 No Pain    06/18/25 0000 -- -- -- -- -- -- -- -- 14 --    06/17/25 " 2300 -- 71 14 115/59 83 98 % -- -- 14 --    06/17/25 2200 -- 71 16 108/49 77 97 % -- -- 14 --    06/17/25 2100 -- 74 14 117/59 82 98 % -- -- 14 --    06/17/25 2000 -- 76 24 112/61 76 97 % -- -- 14 --    06/17/25 1952 -- 80 31 135/65 92 98 % -- -- 14 No Pain    06/17/25 1930 99.5 °F (37.5 °C) 94 36 95/66 77 98 % None (Room air) Lying -- --    06/17/25 1900 -- 78 20 108/78 85 98 % -- -- 14 --    06/17/25 1815 -- 72 23 110/57 75 97 % -- -- -- --    06/17/25 1800 -- 74 14 90/54 67 97 % -- -- -- --    06/17/25 1745 -- 74 20 110/57 80 98 % -- -- -- --    06/17/25 1730 -- 72 22 100/55 74 97 % -- -- -- --    06/17/25 1725 -- 77 20 110/69 85 98 % -- -- -- --    06/17/25 1715 -- 77 20 108/63 79 98 % -- -- -- --    06/17/25 1700 -- 85 20 92/70 76 98 % -- -- -- --    06/17/25 1615 -- 83 20 134/60 87 97 % -- -- -- --    06/17/25 1600 -- 90 18 117/101 106 97 % -- -- 11 --    06/17/25 1500 -- 77 17 104/63 80 96 % -- -- -- --    06/17/25 1400 -- 70 14 86/50 62 96 % None (Room air) Lying -- --    06/17/25 1315 -- 75 17 113/55 79 95 % None (Room air) Lying -- --    06/17/25 1300 -- -- -- -- -- -- -- Lying 15 --    06/17/25 1245 -- 73 13 82/49 59 94 % None (Room air) Lying -- --    06/17/25 1230 -- 87 17 141/77 104 96 % -- -- -- --    06/17/25 1225 -- 83 14 159/95 122 96 % -- -- -- --    06/17/25 1200 -- 81 20 147/65 94 97 % None (Room air) Lying 15 --    06/17/25 1100 97.8 °F (36.6 °C) 73 13 94/52 67 97 % None (Room air) Lying 15 --    06/17/25 1030 -- 75 11 132/64 92 98 % -- -- -- --    06/17/25 1000 -- 83 15 136/86 106 97 % None (Room air) Lying 15 --    06/17/25 0900 -- 80 23 146/76 103 96 % None (Room air) Lying 15 --    06/17/25 0830 -- 72 12 117/59 82 95 % -- -- -- --    06/17/25 0800 -- 73 14 121/69 90 96 % -- -- 15 No Pain    06/17/25 0744 97.9 °F (36.6 °C) 88 32 120/63 86 96 % None (Room air) Lying -- --    06/17/25 0700 -- 77 19 114/68 84 94 % None (Room air) Lying 15 --    06/17/25 0630 -- 77 18 145/102 118 95 % -- -- --  --    06/17/25 0600 -- -- -- -- -- -- -- -- 15 No Pain    06/17/25 0530 -- 72 16 119/56 79 96 % -- -- -- --    06/17/25 0500 -- 84 16 130/62 89 96 % -- -- 15 --    06/17/25 0430 -- 80 16 169/77 111 96 % -- -- -- --    06/17/25 0400 -- 84 17 131/66 88 95 % -- -- 15 No Pain    06/17/25 0330 -- 92 16 146/83 107 95 % -- -- -- --    06/17/25 0329 98.1 °F (36.7 °C) 95 20 113/78 93 95 % None (Room air) Lying -- --    06/17/25 0300 -- 93 18 151/76 -- 94 % None (Room air) Sitting 11 --    06/17/25 0245 -- 93 18 169/105 -- 95 % None (Room air) Sitting 11 --    06/17/25 0230 -- 97 19 163/93 -- 95 % None (Room air) Sitting 11 --    06/17/25 0215 -- 115 18 176/86 -- 94 % None (Room air) Sitting 11 --    06/17/25 0200 -- 106 17 161/80 -- 95 % None (Room air) -- 11 --    06/17/25 0145 -- 103 16 150/102 -- 95 % None (Room air) Sitting 11 --    06/17/25 0130 -- 111 17 170/95 -- 96 % None (Room air) Sitting 11 --    06/17/25 0115 -- 101 17 169/96 -- 96 % None (Room air) Sitting 11 --    06/17/25 0100 -- -- -- -- -- -- -- -- 11 --    06/17/25 0045 -- -- -- -- -- -- -- -- 11 --    06/17/25 0030 100 °F (37.8 °C) -- -- -- -- -- -- -- 11 --    06/17/25 0015 -- 95 16 140/77 -- 97 % None (Room air) Sitting 11 --    06/17/25 0000 -- 96 17 126/74 -- 96 % None (Room air) Lying 11 --          Weight (last 2 days)       Date/Time Weight    06/18/25 0300 53.7 (118.39)    06/17/25 0329 52 (114.64)    06/17/25 0000 55.1 (121.47)            Pertinent Labs/Diagnostic Results:   Radiology:  CT head wo contrast   Final Interpretation by Markie Asencio MD (06/18 0204)      Stable 9 mm hemorrhage in the left corona radiata. No mass effect or evidence of new hemorrhage..                  Workstation performed: [id number]         MRI brain wo contrast   Final Interpretation by Blas Ugalde MD (06/17 3067)      Markedly motion-degraded, with significant portions of the examination nondiagnostic/essentially nondiagnostic. Within these confines,  no acute large territory infarct, significant mass effect or midline shift.      Evolving known left gangliocapsular intraparenchymal hemorrhage.      Recommend repeat examination when clinically appropriate/tolerable.      The study was marked in EPIC for immediate notification.      Workstation performed: [id number]         VAS carotid complete study   Final Interpretation by Jeffrey Yuen DO (06/17 1318)      CT head wo contrast   Final Interpretation by Alfredo Romero MD (06/17 0862)      No significant change in the size or appearance of the left gangliocapsular intraparenchymal hemorrhage.      No new midline shift or mass effect. No new areas of hemorrhage.                  Workstation performed: [id number]         X-ray chest 1 view portable   Final Interpretation by Oscar Barroso MD (06/17 1223)      Left basilar retrocardiac atelectasis and/or scarring without other acute cardiopulmonary findings.               Resident: Wesley Santiago I, the attending radiologist, have reviewed the images and agree with the final report above.      Workstation performed: [id number]         CTA stroke alert (head/neck)   Final Interpretation by Mitchell Westfall DO (06/17 0031)   Approximately 50% right cervical internal carotid artery stenosis by NASCET criteria.   No evidence of large intracranial aneurysm, high-grade stenosis, or large vessel occlusion.   Redemonstrated acute intracranial hemorrhage, also seen on concurrent noncontrast CT head.   I discussed this study via [employer] secure chat with Dr. Stefan Child on  6/17/2024 12:16 AM         Workstation performed: [id number]         CT stroke alert brain   Final Interpretation by Mitchell Westfall DO (06/17 0020)   New small area of presumed intraparenchymal hemorrhage measuring up to 8 mm in the left posterior internal capsule region.   I discussed this study via [employer] secure chat with Dr. Stefan Child on  6/17/2024 12:16 AM            Workstation performed:  [id number]           Cardiology:  ECG 12 lead    by Gerardo Amos Results In (06/17 1926)      ECG 12 lead   Final Result by Ollie Leung MD (06/17 0734)   Sinus rhythm with Premature atrial complexes   Moderate voltage criteria for LVH, may be normal variant   Borderline ECG   When compared with ECG of 17-Jun-2025 00:11, (unconfirmed)   Premature atrial complexes are now Present   Questionable change in QRS axis   Nonspecific T wave abnormality no longer evident in Inferior leads   Confirmed by Ollie Leung ([id number]) on 6/17/2025 7:34:42 AM      ECG 12 lead   Final Result by Ollie Leung MD (06/17 0741)   Normal sinus rhythm with sinus arrhythmia   Minimal voltage criteria for LVH, may be normal variant ( Sokolow-Ivan )   Borderline ECG   When compared with ECG of 05-Apr-2022 10:26,   Premature ventricular complexes are no longer Present   Questionable change in QRS axis   Nonspecific T wave abnormality now evident in Inferior leads   Confirmed by Ollie Leung ([id number]) on 6/17/2025 7:41:02 AM        Results from last 7 days   Lab Units 06/18/25 0444 06/17/25 0448 06/17/25  0015   WBC Thousand/uL 4.44 6.70 6.97   HEMOGLOBIN g/dL 9.1* 10.3* 10.7*   HEMATOCRIT % 27.9* 29.9* 31.3*   PLATELETS Thousands/uL 183 191 216         Results from last 7 days   Lab Units 06/18/25  0444 06/17/25 0448 06/17/25  0015   SODIUM mmol/L 140 139 140   POTASSIUM mmol/L 3.5 3.3* 3.5   CHLORIDE mmol/L 109* 105 105   CO2 mmol/L 28 28 28   ANION GAP mmol/L 3* 6 7   BUN mg/dL 29* 30* 36*   CREATININE mg/dL 1.41* 1.42* 1.56*   EGFR ml/min/1.73sq m 45 45 40   CALCIUM mg/dL 8.8 8.8 8.9   CALCIUM, IONIZED mmol/L  --  1.08*  --    MAGNESIUM mg/dL 2.3 2.1 2.3   PHOSPHORUS mg/dL  --  2.6  --      Results from last 7 days   Lab Units 06/17/25  0015   AST U/L 27   ALT U/L 22   ALK PHOS U/L 99   TOTAL PROTEIN g/dL 6.8   ALBUMIN g/dL 3.7   TOTAL BILIRUBIN mg/dL 0.64     Results from last 7 days   Lab Units 06/16/25  2353   POC GLUCOSE mg/dl 108      Results from last 7 days   Lab Units 06/18/25  0444 06/17/25  0448 06/17/25  0015   GLUCOSE RANDOM mg/dL 86 106 104     Results from last 7 days   Lab Units 06/17/25  0448 06/17/25  0223 06/17/25  0015   HS TNI 0HR ng/L  --   --  12   HS TNI 2HR ng/L  --  12  --    HSTNI D2 ng/L  --  0  --    HS TNI 4HR ng/L 14  --   --    HSTNI D4 ng/L 2  --   --          Results from last 7 days   Lab Units 06/17/25  0448 06/17/25  0015   PROTIME seconds 14.2 14.0   INR  1.06 1.04   PTT seconds  --  30     Results from last 7 days   Lab Units 06/17/25  0111   CLARITY UA  Clear   COLOR UA  Yellow   SPEC GRAV UA  1.010   PH UA  6.5   GLUCOSE UA mg/dl Negative   KETONES UA mg/dl Negative   BLOOD UA  Trace-lysed*   PROTEIN UA mg/dl Negative   NITRITE UA  Negative   BILIRUBIN UA  Negative   UROBILINOGEN UA E.U./dl 0.2   LEUKOCYTES UA  Negative   WBC UA /hpf None Seen   RBC UA /hpf 0-1   BACTERIA UA /hpf None Seen   EPITHELIAL CELLS WET PREP /hpf Occasional                                                   Network Utilization Review Department  ATTENTION: Please call with any questions or concerns to [phone] and carefully listen to the prompts so that you are directed to the right person. All voicemails are confidential.   For Discharge needs, contact Care Management DC Support Team at [phone] opt. 2  Send all requests for admission clinical reviews, approved or denied determinations and any other requests to dedicated fax number below belonging to the [facility] where the patient is receiving treatment. List of dedicated fax numbers for the Facilities:  FACILITY NAME UR FAX NUMBER   ADMISSION DENIALS (Administrative/Medical Necessity) [phone]   DISCHARGE SUPPORT TEAM (NETWORK) [phone]   PARENT CHILD HEALTH (Maternity/NICU/Pediatrics) [phone]   [facility] [phone]   [facility] [phone]   [facility] [phone]   [facility]  [facility] [phone]   [facility] [phone]   [facility] [phone]   [facility] [phone]   [facility] [phone]   [facility] [phone]   [facility] [phone]   [facility] [phone]   [facility] [phone]

## 2025-06-18 NOTE — PROGRESS NOTES
Patient:    MRN:  [id number]    Aidin Request ID:  [id number]    Level of care reserved:  Skilled Nursing Facility    Partner Reserved:  [facility], [place] [address] [phone]    Clinical needs requested:    Geography searched:  15 miles around [id number]    Start of Service:    Request sent:  1:54pm EDT on 6/17/2025 by Alessandra Hernández    Partner reserved:  11:45am EDT on 6/18/2025 by Alessandra Hernández    Choice list shared:  11:37am EDT on 6/18/2025 by Alessandra Hernández

## 2025-06-18 NOTE — CONSULTS
Consult received for stroke pathway.     Currently NPO, SLP following. Pt appeared to be answering questions appropriately during visit with author. Reports ongoing decreased appetite though uncertain how long this was happening. Reports NKFA. States did not notice chewing/swallowing issues PTA. Chart review of weight hx: 4/10/24 128lb, 10/22/24 125lb, 6/6/25 118lb, 6/18/25 118lb, wt loss noted though nonsignificant. Pt with severe muscle and fat wasting noted.     Meets criteria for chronic severe malnutrition.   In the event it is medically appropriate/within goals of care and pt continues with NPO/inability to start oral diet, would recommend Jevity 1.2 initiated at 5 ml/h, advance by 5-10 ml/h q 4-6 h as medically appropriate/tolerated to goal rate of 52 ml/h continuous feed. This will provide 1498 kcal, 69 g PRO, 1007 ml free water.   Water flush per MD.   Pt would be refeeding risk. Monitor -lytes daily to q 12 h and replete as medically indicated.   Consider administering IV thiamine prior to starting EN feeds.   Recommend daily weights for nutrition monitoring.   If appropriate for oral diet, will consider nutrition supplements.

## 2025-06-18 NOTE — SPEECH THERAPY NOTE
Speech Language/Pathology    Speech/Language Pathology Progress Note    Patient Name: Maximino Diaz Sr.  Today's Date: 6/18/2025     Problem List  Principal Problem:    Intracranial hemorrhage (HCC)  Active Problems:    Severe protein-calorie malnutrition (HCC)    Stenosis of right carotid artery    Chronic kidney disease, stage 3a (HCC)    Stroke-like symptoms    Dysphagia       Past Medical History  Past Medical History[1]     Past Surgical History  Past Surgical History[2]      Subjective:  Pt was alert and positioned upright.   Objective:  Pt was seen for f/u dysphagia therapy of PO potential. New to this ST. Per chart review and son pt much more alert than day prior. Mouth appeared dry. Provided x5 small ice chips, tsp/straw sips of thin, and x3 tsp of puree. Decreased oral acceptance initially. Bolus control and formation were WFL. Transfer and Swallow initiation were MOD delayed. Appeared VERY effortful, min grimacing at times. Pt denied pain with swallow. X1 weak cough following trial of ice chips. Pt did somewhat appear to attempting to cough/throat clear at times however very weak. Multiple swallows palpated as session progressed. ST discontinued trials. Reviewed with pt and son at bedside recommendation for ice chips/tsp of thin water at bedside to begin to stimulate swallow mechanism, reduce risk of disuse atrophy, and build strength until other po can be taken safely. Also discussed recommendation for VBS to further assess swallow function. Reviewed session with nursing.   Assessment:  Mental status appears improved from day prior. Prolonged oral phase overall. Decreased oral acceptance initially. MOD delayed swallow initiation. Laryngeal rise appeared very effortful upon palpation. Multiple swallows (3) palpated as session progressed  Very weak cough.   Plan/Recommendations:  Recommend NPO for ice chips/tsp of thin for pleasure with nursing supervision  Completed through oral care prior.   Observe  anatomy and secretions, vitals to be monitored for change in status, oral suctioning available  Observe anatomy and secretions, vitals to be monitored for change in status, oral suctioning available  Administer 3 trials of ice chips, monitor for overt s/s of aspiration, change in vitals/alertness/status  Continue to progress trials  If complete oral control/manipulation, timely initiation of swallow , no lengthy spillage, secretions reduced, or spontaneous cough/throat clear successful in clearing aspirated material   Discontinue trials and retry at later time  If increased difficulties with oral control, no initiation of swallow , excessive coughing, increased secretions, or excessive gurgly voice with no success cued cough throat clear      Time Out: 1:20  Time In: 12:56            [1]   Past Medical History:  Diagnosis Date    Colon cancer (HCC)     GERD (gastroesophageal reflux disease)    [2]   Past Surgical History:  Procedure Laterality Date    EXPLORATORY LAPAROTOMY W/ BOWEL RESECTION N/A 4/5/2022    Procedure: LAPAROTOMY EXPLORATORY, RIGHT DWAYNE COLECTOMY WITH ANASTOMOSIS;  Surgeon: Amy Parker DO;  Location:  MAIN OR;  Service: General    HERNIA REPAIR

## 2025-06-18 NOTE — ASSESSMENT & PLAN NOTE
Lab Results   Component Value Date    EGFR 45 06/17/2025    EGFR 40 06/17/2025    EGFR 38 06/01/2025    CREATININE 1.42 (H) 06/17/2025    CREATININE 1.56 (H) 06/17/2025    CREATININE 1.60 (H) 06/01/2025     Appears to be at baseline on admission.

## 2025-06-18 NOTE — PROGRESS NOTES
"Progress Note - Critical Care/ICU   Name: Maximino Diaz Sr. 85 y.o. male I MRN: [id number]  Unit/Bed#: -01 I Date of Admission: 6/16/2025   Date of Service: 6/18/2025 I Hospital Day: 1      Assessment & Plan  Intracranial hemorrhage (HCC)  Patient found by family at home with a LKWT of 2130 on 6/16 with garbled speech, lethargy and confusion.  CT Head: New small area of presumed intraparenchymal hemorrhage measuring up to 8 mm in the left posterior internal capsule region.   Neurology consulted in ED.  Neurosurgery consulted - no intervention    Plan:   Neurology Recs:  Recommend admit under stroke/ICH protocol  Goal -140  Hold AP/AC for now  6/17 Repeat CTH in 6 hour completed - no significant change in the size or appearance of the left gangliocapsular intraparenchymal hemorrhage   6/17 MRI brain with and without contrast routine - \"evolving known left gangliocapsular intraparenchymal hemorrhage\"  Repeat CTH at 24 hour - no new hemorrhage and unchanged in size from 6/17 CT scan  Routine EEG - negative  Seizure precautions  Q4hr neurochecks  Severe protein-calorie malnutrition (HCC)  Malnutrition Findings:                                 BMI Findings:           Body mass index is 22.39 kg/m².     Stenosis of right carotid artery  CTA head/neck: Approximately 50% right cervical internal carotid artery stenosis by NASCET criteria. No evidence of large intracranial aneurysm, high-grade stenosis, or large vessel occlusion.  Carotid US ordered.  Start aspirin/plavix when medically appropriate per Neurology.   Chronic kidney disease, stage 3a (HCC)  Lab Results   Component Value Date    EGFR 45 06/17/2025    EGFR 40 06/17/2025    EGFR 38 06/01/2025    CREATININE 1.42 (H) 06/17/2025    CREATININE 1.56 (H) 06/17/2025    CREATININE 1.60 (H) 06/01/2025     Appears to be at baseline on admission.    Stroke-like symptoms    Dysphagia  SLP seen patient 6/17 -keep n.p.o.  Disposition: Stepdown Level 1    ICU " Core Measures     A: Assess, Prevent, and Manage Pain Has pain been assessed? Yes  Need for changes to pain regimen? No   B: Both SAT/SAT  N/A   C: Choice of Sedation RASS Goal: 0 Alert and Calm  Need for changes to sedation or analgesia regimen? N/A   D: Delirium CAM-ICU: Unable to perform secondary to Acute cognitive dysfunction   E: Early Mobility  Plan for early mobility? Yes   F: Family Engagement Plan for family engagement today? Yes         Prophylaxis:  VTE Contraindicated secondary to: hemorrhagic stroke   Stress Ulcer  not orderedcovered bypantoprazole (PROTONIX) 40 mg tablet [[id number]] (Long-Term Med)         24 Hour Events : 24 hour CT imaging revealing no new hemorrhage or change from prior imaging. No adverse events overnight    Subjective   Review of Systems: Review of Systems   Unable to perform ROS: Mental status change       Objective :                   Vitals I/O      Most Recent Min/Max in 24hrs   Temp 99.5 °F (37.5 °C) Temp  Min: 97.8 °F (36.6 °C)  Max: 99.5 °F (37.5 °C)   Pulse 64 Pulse  Min: 62  Max: 94   Resp 12 Resp  Min: 11  Max: 36   BP 90/54 BP  Min: 82/49  Max: 169/77   O2 Sat 97 % SpO2  Min: 94 %  Max: 98 %      Intake/Output Summary (Last 24 hours) at 6/18/2025 0330  Last data filed at 6/17/2025 1733  Gross per 24 hour   Intake 700 ml   Output 619 ml   Net 81 ml       Diet NPO    Invasive Monitoring           Physical Exam   Physical Exam  Eyes:      Extraocular Movements: Extraocular movements intact.      Pupils: Pupils are equal, round, and reactive to light.   Skin:     General: Skin is warm and dry.      Capillary Refill: Capillary refill takes less than 2 seconds.   HENT:      Head: Normocephalic.      Mouth/Throat:      Mouth: Mucous membranes are dry.   Cardiovascular:      Rate and Rhythm: Normal rate and regular rhythm.      Pulses: Normal pulses.      Heart sounds: Normal heart sounds.   Musculoskeletal:      Right lower leg: No edema.      Left lower leg: No edema.    Abdominal: General: Bowel sounds are normal. There is no distension.      Palpations: Abdomen is soft.      Tenderness: There is no abdominal tenderness.   Constitutional:       General: He is not in acute distress.     Appearance: He is well-developed and well-nourished. He is not ill-appearing.   Pulmonary:      Effort: Pulmonary effort is normal. No respiratory distress.      Breath sounds: Normal breath sounds. No wheezing or rhonchi.   Neurological:      Mental Status: He is alert.      Sensory: Sensation is intact.      Comments: Gibberish and garbled speech, can follow commands if given time (lifts up arms and moves feet to command) tremors noted but chronic for patient           Diagnostic Studies        Lab Results: I have reviewed the following results:     Medications:  Scheduled PRN   chlorhexidine, 15 mL, Q12H ED      labetalol, 5 mg, Q4H PRN       Continuous          Labs:   CBC    Recent Labs     06/17/25 0015 06/17/25 0448   WBC 6.97 6.70   HGB 10.7* 10.3*   HCT 31.3* 29.9*    191     BMP    Recent Labs     06/17/25 0015 06/17/25 0448   SODIUM 140 139   K 3.5 3.3*    105   CO2 28 28   AGAP 7 6   BUN 36* 30*   CREATININE 1.56* 1.42*   CALCIUM 8.9 8.8       Coags    Recent Labs     06/17/25 0015 06/17/25 0448   INR 1.04 1.06   PTT 30  --         Additional Electrolytes  Recent Labs     06/17/25 0015 06/17/25 0448   MG 2.3 2.1   PHOS  --  2.6   CAIONIZED  --  1.08*          Blood Gas    No recent results  No recent results LFTs  Recent Labs     06/17/25 0015   ALT 22   AST 27   ALKPHOS 99   ALB 3.7   TBILI 0.64       Infectious  No recent results  Glucose  Recent Labs     06/17/25 0015 06/17/25 0448   GLUC 104 106

## 2025-06-18 NOTE — CASE MANAGEMENT
Case Management Discharge Planning Note    Patient name Maximino Diaz Sr.  Location /-01 MRN [id number]  : 1940 Date 2025       Current Admission Date: 2025  Current Admission Diagnosis:Intracranial hemorrhage (HCC)   Patient Active Problem List    Diagnosis Date Noted    Stenosis of right carotid artery 2025    Intracranial hemorrhage (HCC) 2025    Stroke-like symptoms 2025    Dysphagia 2025    Venous stasis dermatitis of both lower extremities 2025    History of colon cancer 2024    Chronic kidney disease, stage 3a (HCC) 2023    History of hemicolectomy 2022    Tinea corporis 2022    Malnutrition (HCC) 2022    Severe protein-calorie malnutrition (HCC) 2022    Colonic mass     Small bowel obstruction (HCC) 2022    Acute on chronic anemia 2022    JENSEN (acute kidney injury) (HCC) 2022      LOS (days): 1  Geometric Mean LOS (GMLOS) (days): 4.5  Days to GMLOS:3.1     OBJECTIVE:  Risk of Unplanned Readmission Score: 13.78         Current admission status: Inpatient   Preferred Pharmacy:   [facility]/pharmacy #[id number] - [place], [place] - [address] [facility]  [address] [facility] [address]  Phone: [phone] Fax: [phone]    Primary Care Provider: Daniel Martins DO    Primary Insurance: [employer] [facility]  Secondary Insurance:     DISCHARGE DETAILS:             Case was discussed in multi disciplinary rounds. Attending the rounds were the provider, Nursing, Care Management, and therapy ( OT)  ICH   Patient remains NPO at this time, being followed by speech therapy.  DC recommendation is  STR  CM will continue to follow.

## 2025-06-18 NOTE — PLAN OF CARE
Problem: PAIN - ADULT  Goal: Verbalizes/displays adequate comfort level or baseline comfort level  Description: Interventions:  - Encourage patient to monitor pain and request assistance  - Assess pain using appropriate pain scale  - Administer analgesics as ordered based on type and severity of pain and evaluate response  - Implement non-pharmacological measures as appropriate and evaluate response  - Consider cultural and social influences on pain and pain management  - Notify physician/advanced practitioner if interventions unsuccessful or patient reports new pain  - Educate patient/family on pain management process including their role and importance of  reporting pain   - Provide non-pharmacologic/complimentary pain relief interventions  Outcome: Progressing     Problem: INFECTION - ADULT  Goal: Absence or prevention of progression during hospitalization  Description: INTERVENTIONS:  - Assess and monitor for signs and symptoms of infection  - Monitor lab/diagnostic results  - Monitor all insertion sites, i.e. indwelling lines, tubes, and drains  - Monitor endotracheal if appropriate and nasal secretions for changes in amount and color  - [place] appropriate cooling/warming therapies per order  - Administer medications as ordered  - Instruct and encourage patient and family to use good hand hygiene technique  - Identify and instruct in appropriate isolation precautions for identified infection/condition  Outcome: Progressing  Goal: Absence of fever/infection during neutropenic period  Description: INTERVENTIONS:  - Monitor WBC  - Perform strict hand hygiene  - Limit to healthy visitors only  - No plants, dried, fresh or silk flowers with moser in patient room  Outcome: Progressing     Problem: SAFETY ADULT  Goal: Patient will remain free of falls  Description: INTERVENTIONS:  - Educate patient/family on patient safety including physical limitations  - Instruct patient to call for assistance with activity   -  Consider consulting OT/PT to assist with strengthening/mobility based on AM PAC & JH-HLM score  - Consult OT/PT to assist with strengthening/mobility   - Keep Call bell within reach  - Keep bed low and locked with side rails adjusted as appropriate  - Keep care items and personal belongings within reach  - Initiate and maintain comfort rounds  - Make Fall Risk Sign visible to staff  - Offer Toileting every 2 Hours, in advance of need  - Initiate/Maintain bed alarm  - Obtain necessary fall risk management equipment:   - Apply yellow socks and bracelet for high fall risk patients  - Consider moving patient to room near nurses station  Outcome: Progressing  Goal: Maintain or return to baseline ADL function  Description: INTERVENTIONS:  -  Assess patient's ability to carry out ADLs; assess patient's baseline for ADL function and identify physical deficits which impact ability to perform ADLs (bathing, care of mouth/teeth, toileting, grooming, dressing, etc.)  - Assess/evaluate cause of self-care deficits   - Assess range of motion  - Assess patient's mobility; develop plan if impaired  - Assess patient's need for assistive devices and provide as appropriate  - Encourage maximum independence but intervene and supervise when necessary  - Involve family in performance of ADLs  - Assess for home care needs following discharge   - Consider OT consult to assist with ADL evaluation and planning for discharge  - Provide patient education as appropriate  - Monitor functional capacity and physical performance, use of AM PAC & JH-HLM   - Monitor gait, balance and fatigue with ambulation    Outcome: Progressing  Goal: Maintains/Returns to pre admission functional level  Description: INTERVENTIONS:  - Perform AM-PAC 6 Click Basic Mobility/ Daily Activity assessment daily.  - Set and communicate daily mobility goal to care team and patient/family/caregiver.   - Collaborate with rehabilitation services on mobility goals if  consulted  - Perform Range of Motion 2 times a day.  - Reposition patient every 2 hours.  - Dangle patient 2 times a day  - Stand patient 2 times a day  - Ambulate patient 2 times a day  - Out of bed to chair 2 times a day   - Out of bed for meals 2 times a day  - Out of bed for toileting  - Record patient progress and toleration of activity level   Outcome: Progressing     Problem: DISCHARGE PLANNING  Goal: Discharge to home or other facility with appropriate resources  Description: INTERVENTIONS:  - Identify barriers to discharge w/patient and caregiver  - Arrange for needed discharge resources and transportation as appropriate  - Identify discharge learning needs (meds, wound care, etc.)  - Arrange for interpretive services to assist at discharge as needed  - Refer to Case Management Department for coordinating discharge planning if the patient needs post-hospital services based on physician/advanced practitioner order or complex needs related to functional status, cognitive ability, or social support system  Outcome: Progressing     Problem: Knowledge Deficit  Goal: Patient/family/caregiver demonstrates understanding of disease process, treatment plan, medications, and discharge instructions  Description: Complete learning assessment and assess knowledge base.  Interventions:  - Provide teaching at level of understanding  - Provide teaching via preferred learning methods  Outcome: Progressing     Problem: Prexisting or High Potential for Compromised Skin Integrity  Goal: Skin integrity is maintained or improved  Description: INTERVENTIONS:  - Identify patients at risk for skin breakdown  - Assess and monitor skin integrity including under and around medical devices   - Assess and monitor nutrition and hydration status  - Monitor labs  - Assess for incontinence   - Turn and reposition patient  - Assist with mobility/ambulation  - Relieve pressure over jovani prominences   - Avoid friction and shearing  - Provide  appropriate hygiene as needed including keeping skin clean and dry  - Evaluate need for skin moisturizer/barrier cream  - Collaborate with interdisciplinary team  - Patient/family teaching  - Consider wound care consult    Assess:  - Review Madhu scale daily  - Clean and moisturize skin every shift  - Inspect skin when repositioning, toileting, and assisting with ADLS  - Assess extremities for adequate circulation and sensation     Bed Management:  - Have minimal linens on bed & keep smooth, unwrinkled  - Change linens as needed when moist or perspiring  - Avoid sitting or lying in one position for more than 2 hours while in bed?Keep HOB at 30degrees   - Toileting:  - Offer bedside commode  - Assess for incontinence every 2 hours  - Use incontinent care products after each incontinent episode such as cream    Activity:  - Mobilize patient 3 times a day  - Encourage activity and walks on unit  - Encourage or provide ROM exercises   - Turn and reposition patient every 2 Hours  - Use appropriate equipment to lift or move patient in bed  - Instruct/ Assist with weight shifting every 2 hours when out of bed in chair  - Consider limitation of chair time 2 hour intervals    Skin Care:  - Avoid use of baby powder, tape, friction and shearing, hot water or constrictive clothing  - Relieve pressure over bony prominences using pressure redistribution  - Do not massage red bony areas    Next Steps:  - Teach patient strategies to minimize risks such as   - Consider consults to  interdisciplinary teams such as   Outcome: Progressing

## 2025-06-18 NOTE — PLAN OF CARE
Problem: PAIN - ADULT  Goal: Verbalizes/displays adequate comfort level or baseline comfort level  Description: Interventions:  - Encourage patient to monitor pain and request assistance  - Assess pain using appropriate pain scale  - Administer analgesics as ordered based on type and severity of pain and evaluate response  - Implement non-pharmacological measures as appropriate and evaluate response  - Consider cultural and social influences on pain and pain management  - Notify physician/advanced practitioner if interventions unsuccessful or patient reports new pain  - Educate patient/family on pain management process including their role and importance of  reporting pain   - Provide non-pharmacologic/complimentary pain relief interventions  Outcome: Progressing     Problem: INFECTION - ADULT  Goal: Absence or prevention of progression during hospitalization  Description: INTERVENTIONS:  - Assess and monitor for signs and symptoms of infection  - Monitor lab/diagnostic results  - Monitor all insertion sites, i.e. indwelling lines, tubes, and drains  - Monitor endotracheal if appropriate and nasal secretions for changes in amount and color  - [place] appropriate cooling/warming therapies per order  - Administer medications as ordered  - Instruct and encourage patient and family to use good hand hygiene technique  - Identify and instruct in appropriate isolation precautions for identified infection/condition  Outcome: Progressing  Goal: Absence of fever/infection during neutropenic period  Description: INTERVENTIONS:  - Monitor WBC  - Perform strict hand hygiene  - Limit to healthy visitors only  - No plants, dried, fresh or silk flowers with moser in patient room  Outcome: Progressing     Problem: SAFETY ADULT  Goal: Patient will remain free of falls  Description: INTERVENTIONS:  - Educate patient/family on patient safety including physical limitations  - Instruct patient to call for assistance with activity   -  Consider consulting OT/PT to assist with strengthening/mobility based on AM PAC & JH-HLM score  - Consult OT/PT to assist with strengthening/mobility   - Keep Call bell within reach  - Keep bed low and locked with side rails adjusted as appropriate  - Keep care items and personal belongings within reach  - Initiate and maintain comfort rounds  - Make Fall Risk Sign visible to staff  - Offer Toileting every 2 Hours, in advance of need  - Initiate/Maintain bed alarm  - Obtain necessary fall risk management equipment:   - Apply yellow socks and bracelet for high fall risk patients  - Consider moving patient to room near nurses station  Outcome: Progressing  Goal: Maintain or return to baseline ADL function  Description: INTERVENTIONS:  -  Assess patient's ability to carry out ADLs; assess patient's baseline for ADL function and identify physical deficits which impact ability to perform ADLs (bathing, care of mouth/teeth, toileting, grooming, dressing, etc.)  - Assess/evaluate cause of self-care deficits   - Assess range of motion  - Assess patient's mobility; develop plan if impaired  - Assess patient's need for assistive devices and provide as appropriate  - Encourage maximum independence but intervene and supervise when necessary  - Involve family in performance of ADLs  - Assess for home care needs following discharge   - Consider OT consult to assist with ADL evaluation and planning for discharge  - Provide patient education as appropriate  - Monitor functional capacity and physical performance, use of AM PAC & JH-HLM   - Monitor gait, balance and fatigue with ambulation    Outcome: Progressing  Goal: Maintains/Returns to pre admission functional level  Description: INTERVENTIONS:  - Perform AM-PAC 6 Click Basic Mobility/ Daily Activity assessment daily.  - Set and communicate daily mobility goal to care team and patient/family/caregiver.   - Collaborate with rehabilitation services on mobility goals if  consulted  - Perform Range of Motion 2 times a day.  - Reposition patient every 2 hours.  - Dangle patient 2 times a day  - Stand patient 2 times a day  - Ambulate patient 2 times a day  - Out of bed to chair 2 times a day   - Out of bed for meals 2 times a day  - Out of bed for toileting  - Record patient progress and toleration of activity level   Outcome: Progressing     Problem: DISCHARGE PLANNING  Goal: Discharge to home or other facility with appropriate resources  Description: INTERVENTIONS:  - Identify barriers to discharge w/patient and caregiver  - Arrange for needed discharge resources and transportation as appropriate  - Identify discharge learning needs (meds, wound care, etc.)  - Arrange for interpretive services to assist at discharge as needed  - Refer to Case Management Department for coordinating discharge planning if the patient needs post-hospital services based on physician/advanced practitioner order or complex needs related to functional status, cognitive ability, or social support system  Outcome: Progressing     Problem: Knowledge Deficit  Goal: Patient/family/caregiver demonstrates understanding of disease process, treatment plan, medications, and discharge instructions  Description: Complete learning assessment and assess knowledge base.  Interventions:  - Provide teaching at level of understanding  - Provide teaching via preferred learning methods  Outcome: Progressing     Problem: Prexisting or High Potential for Compromised Skin Integrity  Goal: Skin integrity is maintained or improved  Description: INTERVENTIONS:  - Identify patients at risk for skin breakdown  - Assess and monitor skin integrity including under and around medical devices   - Assess and monitor nutrition and hydration status  - Monitor labs  - Assess for incontinence   - Turn and reposition patient  - Assist with mobility/ambulation  - Relieve pressure over jovani prominences   - Avoid friction and shearing  - Provide  appropriate hygiene as needed including keeping skin clean and dry  - Evaluate need for skin moisturizer/barrier cream  - Collaborate with interdisciplinary team  - Patient/family teaching  - Consider wound care consult    Assess:  - Review Madhu scale daily  - Clean and moisturize skin every shift  - Inspect skin when repositioning, toileting, and assisting with ADLS  - Assess extremities for adequate circulation and sensation     Bed Management:  - Have minimal linens on bed & keep smooth, unwrinkled  - Change linens as needed when moist or perspiring  - Avoid sitting or lying in one position for more than 2 hours while in bed?Keep HOB at 30degrees   - Toileting:  - Offer bedside commode  - Assess for incontinence every 2 hours  - Use incontinent care products after each incontinent episode such as cream    Activity:  - Mobilize patient 3 times a day  - Encourage activity and walks on unit  - Encourage or provide ROM exercises   - Turn and reposition patient every 2 Hours  - Use appropriate equipment to lift or move patient in bed  - Instruct/ Assist with weight shifting every 2 hours when out of bed in chair  - Consider limitation of chair time 2 hour intervals    Skin Care:  - Avoid use of baby powder, tape, friction and shearing, hot water or constrictive clothing  - Relieve pressure over bony prominences using pressure redistribution  - Do not massage red bony areas    Next Steps:  - Teach patient strategies to minimize risks such as   - Consider consults to  interdisciplinary teams such as   Outcome: Progressing

## 2025-06-19 ENCOUNTER — APPOINTMENT (INPATIENT)
Dept: RADIOLOGY | Facility: HOSPITAL | Age: 85
DRG: 064 | End: 2025-06-19
Payer: COMMERCIAL

## 2025-06-19 LAB
ANION GAP SERPL CALCULATED.3IONS-SCNC: 8 MMOL/L (ref 4–13)
ATRIAL RATE: 80 BPM
ATRIAL RATE: 92 BPM
BUN SERPL-MCNC: 32 MG/DL (ref 5–25)
CALCIUM SERPL-MCNC: 8.5 MG/DL (ref 8.4–10.2)
CHLORIDE SERPL-SCNC: 109 MMOL/L (ref 96–108)
CO2 SERPL-SCNC: 26 MMOL/L (ref 21–32)
CREAT SERPL-MCNC: 1.43 MG/DL (ref 0.6–1.3)
ERYTHROCYTE [DISTWIDTH] IN BLOOD BY AUTOMATED COUNT: 13.6 % (ref 11.6–15.1)
GFR SERPL CREATININE-BSD FRML MDRD: 44 ML/MIN/1.73SQ M
GLUCOSE SERPL-MCNC: 81 MG/DL (ref 65–140)
HCT VFR BLD AUTO: 29.8 % (ref 36.5–49.3)
HGB BLD-MCNC: 9.8 G/DL (ref 12–17)
INR PPP: 1.09 (ref 0.85–1.19)
MCH RBC QN AUTO: 31.8 PG (ref 26.8–34.3)
MCHC RBC AUTO-ENTMCNC: 32.9 G/DL (ref 31.4–37.4)
MCV RBC AUTO: 97 FL (ref 82–98)
P AXIS: 61 DEGREES
P AXIS: 68 DEGREES
PLATELET # BLD AUTO: 195 THOUSANDS/UL (ref 149–390)
PMV BLD AUTO: 8.7 FL (ref 8.9–12.7)
POTASSIUM SERPL-SCNC: 3.9 MMOL/L (ref 3.5–5.3)
PR INTERVAL: 148 MS
PR INTERVAL: 178 MS
PROTHROMBIN TIME: 14.5 SECONDS (ref 12.3–15)
QRS AXIS: 70 DEGREES
QRS AXIS: 70 DEGREES
QRSD INTERVAL: 80 MS
QRSD INTERVAL: 82 MS
QT INTERVAL: 370 MS
QT INTERVAL: 402 MS
QTC INTERVAL: 457 MS
QTC INTERVAL: 463 MS
RBC # BLD AUTO: 3.08 MILLION/UL (ref 3.88–5.62)
SODIUM SERPL-SCNC: 143 MMOL/L (ref 135–147)
T WAVE AXIS: 63 DEGREES
T WAVE AXIS: 68 DEGREES
VENTRICULAR RATE: 80 BPM
VENTRICULAR RATE: 92 BPM
WBC # BLD AUTO: 4.12 THOUSAND/UL (ref 4.31–10.16)

## 2025-06-19 PROCEDURE — 93010 ELECTROCARDIOGRAM REPORT: CPT | Performed by: INTERNAL MEDICINE

## 2025-06-19 PROCEDURE — 92611 MOTION FLUOROSCOPY/SWALLOW: CPT

## 2025-06-19 PROCEDURE — 99232 SBSQ HOSP IP/OBS MODERATE 35: CPT | Performed by: FAMILY MEDICINE

## 2025-06-19 PROCEDURE — 85027 COMPLETE CBC AUTOMATED: CPT | Performed by: NURSE PRACTITIONER

## 2025-06-19 PROCEDURE — 93005 ELECTROCARDIOGRAM TRACING: CPT

## 2025-06-19 PROCEDURE — 74230 X-RAY XM SWLNG FUNCJ C+: CPT

## 2025-06-19 PROCEDURE — 85610 PROTHROMBIN TIME: CPT | Performed by: NURSE PRACTITIONER

## 2025-06-19 PROCEDURE — 80048 BASIC METABOLIC PNL TOTAL CA: CPT | Performed by: NURSE PRACTITIONER

## 2025-06-19 RX ADMIN — CHLORHEXIDINE GLUCONATE 15 ML: 1.2 RINSE ORAL at 21:51

## 2025-06-19 RX ADMIN — CHLORHEXIDINE GLUCONATE 15 ML: 1.2 RINSE ORAL at 08:41

## 2025-06-19 RX ADMIN — HEPARIN SODIUM 5000 UNITS: 5000 INJECTION INTRAVENOUS; SUBCUTANEOUS at 05:13

## 2025-06-19 RX ADMIN — HEPARIN SODIUM 5000 UNITS: 5000 INJECTION INTRAVENOUS; SUBCUTANEOUS at 14:45

## 2025-06-19 RX ADMIN — HEPARIN SODIUM 5000 UNITS: 5000 INJECTION INTRAVENOUS; SUBCUTANEOUS at 21:51

## 2025-06-19 NOTE — PLAN OF CARE
Problem: PAIN - ADULT  Goal: Verbalizes/displays adequate comfort level or baseline comfort level  Description: Interventions:  - Encourage patient to monitor pain and request assistance  - Assess pain using appropriate pain scale  - Administer analgesics as ordered based on type and severity of pain and evaluate response  - Implement non-pharmacological measures as appropriate and evaluate response  - Consider cultural and social influences on pain and pain management  - Notify physician/advanced practitioner if interventions unsuccessful or patient reports new pain  - Educate patient/family on pain management process including their role and importance of  reporting pain   - Provide non-pharmacologic/complimentary pain relief interventions  Outcome: Progressing     Problem: INFECTION - ADULT  Goal: Absence or prevention of progression during hospitalization  Description: INTERVENTIONS:  - Assess and monitor for signs and symptoms of infection  - Monitor lab/diagnostic results  - Monitor all insertion sites, i.e. indwelling lines, tubes, and drains  - Monitor endotracheal if appropriate and nasal secretions for changes in amount and color  - [place] appropriate cooling/warming therapies per order  - Administer medications as ordered  - Instruct and encourage patient and family to use good hand hygiene technique  - Identify and instruct in appropriate isolation precautions for identified infection/condition  Outcome: Progressing  Goal: Absence of fever/infection during neutropenic period  Description: INTERVENTIONS:  - Monitor WBC  - Perform strict hand hygiene  - Limit to healthy visitors only  - No plants, dried, fresh or silk flowers with moser in patient room  Outcome: Progressing     Problem: SAFETY ADULT  Goal: Patient will remain free of falls  Description: INTERVENTIONS:  - Educate patient/family on patient safety including physical limitations  - Instruct patient to call for assistance with activity   -  Consider consulting OT/PT to assist with strengthening/mobility based on AM PAC & JH-HLM score  - Consult OT/PT to assist with strengthening/mobility   - Keep Call bell within reach  - Keep bed low and locked with side rails adjusted as appropriate  - Keep care items and personal belongings within reach  - Initiate and maintain comfort rounds  - Make Fall Risk Sign visible to staff  - Offer Toileting every 2 Hours, in advance of need  - Initiate/Maintain bed alarm  - Obtain necessary fall risk management equipment:   - Apply yellow socks and bracelet for high fall risk patients  - Consider moving patient to room near nurses station  Outcome: Progressing  Goal: Maintain or return to baseline ADL function  Description: INTERVENTIONS:  -  Assess patient's ability to carry out ADLs; assess patient's baseline for ADL function and identify physical deficits which impact ability to perform ADLs (bathing, care of mouth/teeth, toileting, grooming, dressing, etc.)  - Assess/evaluate cause of self-care deficits   - Assess range of motion  - Assess patient's mobility; develop plan if impaired  - Assess patient's need for assistive devices and provide as appropriate  - Encourage maximum independence but intervene and supervise when necessary  - Involve family in performance of ADLs  - Assess for home care needs following discharge   - Consider OT consult to assist with ADL evaluation and planning for discharge  - Provide patient education as appropriate  - Monitor functional capacity and physical performance, use of AM PAC & JH-HLM   - Monitor gait, balance and fatigue with ambulation    Outcome: Progressing  Goal: Maintains/Returns to pre admission functional level  Description: INTERVENTIONS:  - Perform AM-PAC 6 Click Basic Mobility/ Daily Activity assessment daily.  - Set and communicate daily mobility goal to care team and patient/family/caregiver.   - Collaborate with rehabilitation services on mobility goals if  consulted  - Perform Range of Motion 2 times a day.  - Reposition patient every 2 hours.  - Dangle patient 2 times a day  - Stand patient 2 times a day  - Ambulate patient 2 times a day  - Out of bed to chair 2 times a day   - Out of bed for meals 2 times a day  - Out of bed for toileting  - Record patient progress and toleration of activity level   Outcome: Progressing     Problem: DISCHARGE PLANNING  Goal: Discharge to home or other facility with appropriate resources  Description: INTERVENTIONS:  - Identify barriers to discharge w/patient and caregiver  - Arrange for needed discharge resources and transportation as appropriate  - Identify discharge learning needs (meds, wound care, etc.)  - Arrange for interpretive services to assist at discharge as needed  - Refer to Case Management Department for coordinating discharge planning if the patient needs post-hospital services based on physician/advanced practitioner order or complex needs related to functional status, cognitive ability, or social support system  Outcome: Progressing     Problem: Knowledge Deficit  Goal: Patient/family/caregiver demonstrates understanding of disease process, treatment plan, medications, and discharge instructions  Description: Complete learning assessment and assess knowledge base.  Interventions:  - Provide teaching at level of understanding  - Provide teaching via preferred learning methods  Outcome: Progressing     Problem: Prexisting or High Potential for Compromised Skin Integrity  Goal: Skin integrity is maintained or improved  Description: INTERVENTIONS:  - Identify patients at risk for skin breakdown  - Assess and monitor skin integrity including under and around medical devices   - Assess and monitor nutrition and hydration status  - Monitor labs  - Assess for incontinence   - Turn and reposition patient  - Assist with mobility/ambulation  - Relieve pressure over jovani prominences   - Avoid friction and shearing  - Provide  appropriate hygiene as needed including keeping skin clean and dry  - Evaluate need for skin moisturizer/barrier cream  - Collaborate with interdisciplinary team  - Patient/family teaching  - Consider wound care consult    Assess:  - Review Madhu scale daily  - Clean and moisturize skin every shift  - Inspect skin when repositioning, toileting, and assisting with ADLS  - Assess extremities for adequate circulation and sensation     Bed Management:  - Have minimal linens on bed & keep smooth, unwrinkled  - Change linens as needed when moist or perspiring  - Avoid sitting or lying in one position for more than 2 hours while in bed?Keep HOB at 30degrees   - Toileting:  - Offer bedside commode  - Assess for incontinence every 2 hours  - Use incontinent care products after each incontinent episode such as cream    Activity:  - Mobilize patient 3 times a day  - Encourage activity and walks on unit  - Encourage or provide ROM exercises   - Turn and reposition patient every 2 Hours  - Use appropriate equipment to lift or move patient in bed  - Instruct/ Assist with weight shifting every 2 hours when out of bed in chair  - Consider limitation of chair time 2 hour intervals    Skin Care:  - Avoid use of baby powder, tape, friction and shearing, hot water or constrictive clothing  - Relieve pressure over bony prominences using pressure redistribution  - Do not massage red bony areas    Next Steps:  - Teach patient strategies to minimize risks such as   - Consider consults to  interdisciplinary teams such as   Outcome: Progressing     Problem: Nutrition/Hydration-ADULT  Goal: Nutrient/Hydration intake appropriate for improving, restoring or maintaining nutritional needs  Description: Monitor and assess patient's nutrition/hydration status for malnutrition. Collaborate with interdisciplinary team and initiate plan and interventions as ordered.  Monitor patient's weight and dietary intake as ordered or per policy. Utilize  nutrition screening tool and intervene as necessary. Determine patient's food preferences and provide high-protein, high-caloric foods as appropriate.     INTERVENTIONS:  - Monitor oral intake, urinary output, labs, and treatment plans  - Assess nutrition and hydration status and recommend course of action  - Evaluate amount of meals eaten  - Assist patient with eating if necessary   - Allow adequate time for meals  - Recommend/ encourage appropriate diets, oral nutritional supplements, and vitamin/mineral supplements  - Order, calculate, and assess calorie counts as needed  - Recommend, monitor, and adjust tube feedings and TPN/PPN based on assessed needs  - Assess need for intravenous fluids  - Provide specific nutrition/hydration education as appropriate  - Include patient/family/caregiver in decisions related to nutrition  Outcome: Progressing     Problem: SAFETY,RESTRAINT: NV/NON-SELF DESTRUCTIVE BEHAVIOR  Goal: Remains free of harm/injury (restraint for non violent/non self-detsructive behavior)  Description: INTERVENTIONS:  - Instruct patient/family regarding restraint use   - Assess and monitor physiologic and psychological status   - Provide interventions and comfort measures to meet assessed patient needs   - Identify and implement measures to help patient regain control  - Assess readiness for release of restraint   Outcome: Progressing  Goal: Returns to optimal restraint-free functioning  Description: INTERVENTIONS:  - Assess the patient's behavior and symptoms that indicate continued need for restraint  - Identify and implement measures to help patient regain control  - Assess readiness for release of restraint   Outcome: Progressing

## 2025-06-19 NOTE — OCCUPATIONAL THERAPY NOTE
Occupational Therapy Cancel Note        Patient Name: Maximino Diaz Sr.  Today's Date: 6/19/2025 06/19/25 3052   Note Type   Note Type Cancelled Session         Chart reviewed. Attempted to see pt for tx session this date. Spoke with RN, Fiona. At this time, OT tx session held as pt is very lethargic and not medically appropriate for therapy. Will continue to follow pt and provide care as medically appropriate and available at a later time.      Bennie Rose MS, OTR/L

## 2025-06-19 NOTE — ASSESSMENT & PLAN NOTE
Lab Results   Component Value Date    EGFR 44 06/19/2025    EGFR 45 06/18/2025    EGFR 45 06/17/2025    CREATININE 1.43 (H) 06/19/2025    CREATININE 1.41 (H) 06/18/2025    CREATININE 1.42 (H) 06/17/2025   Creatinine Is personally at baseline of 1.4

## 2025-06-19 NOTE — PLAN OF CARE
Problem: PAIN - ADULT  Goal: Verbalizes/displays adequate comfort level or baseline comfort level  Description: Interventions:  - Encourage patient to monitor pain and request assistance  - Assess pain using appropriate pain scale  - Administer analgesics as ordered based on type and severity of pain and evaluate response  - Implement non-pharmacological measures as appropriate and evaluate response  - Consider cultural and social influences on pain and pain management  - Notify physician/advanced practitioner if interventions unsuccessful or patient reports new pain  - Educate patient/family on pain management process including their role and importance of  reporting pain   - Provide non-pharmacologic/complimentary pain relief interventions  Outcome: Progressing     Problem: INFECTION - ADULT  Goal: Absence or prevention of progression during hospitalization  Description: INTERVENTIONS:  - Assess and monitor for signs and symptoms of infection  - Monitor lab/diagnostic results  - Monitor all insertion sites, i.e. indwelling lines, tubes, and drains  - Monitor endotracheal if appropriate and nasal secretions for changes in amount and color  - [place] appropriate cooling/warming therapies per order  - Administer medications as ordered  - Instruct and encourage patient and family to use good hand hygiene technique  - Identify and instruct in appropriate isolation precautions for identified infection/condition  Outcome: Progressing  Goal: Absence of fever/infection during neutropenic period  Description: INTERVENTIONS:  - Monitor WBC  - Perform strict hand hygiene  - Limit to healthy visitors only  - No plants, dried, fresh or silk flowers with moser in patient room  Outcome: Progressing     Problem: SAFETY ADULT  Goal: Patient will remain free of falls  Description: INTERVENTIONS:  - Educate patient/family on patient safety including physical limitations  - Instruct patient to call for assistance with activity   -  Consider consulting OT/PT to assist with strengthening/mobility based on AM PAC & JH-HLM score  - Consult OT/PT to assist with strengthening/mobility   - Keep Call bell within reach  - Keep bed low and locked with side rails adjusted as appropriate  - Keep care items and personal belongings within reach  - Initiate and maintain comfort rounds  - Make Fall Risk Sign visible to staff  - Offer Toileting every 2 Hours, in advance of need  - Initiate/Maintain bed alarm  - Obtain necessary fall risk management equipment:   - Apply yellow socks and bracelet for high fall risk patients  - Consider moving patient to room near nurses station  Outcome: Progressing  Goal: Maintain or return to baseline ADL function  Description: INTERVENTIONS:  -  Assess patient's ability to carry out ADLs; assess patient's baseline for ADL function and identify physical deficits which impact ability to perform ADLs (bathing, care of mouth/teeth, toileting, grooming, dressing, etc.)  - Assess/evaluate cause of self-care deficits   - Assess range of motion  - Assess patient's mobility; develop plan if impaired  - Assess patient's need for assistive devices and provide as appropriate  - Encourage maximum independence but intervene and supervise when necessary  - Involve family in performance of ADLs  - Assess for home care needs following discharge   - Consider OT consult to assist with ADL evaluation and planning for discharge  - Provide patient education as appropriate  - Monitor functional capacity and physical performance, use of AM PAC & JH-HLM   - Monitor gait, balance and fatigue with ambulation    Outcome: Progressing  Goal: Maintains/Returns to pre admission functional level  Description: INTERVENTIONS:  - Perform AM-PAC 6 Click Basic Mobility/ Daily Activity assessment daily.  - Set and communicate daily mobility goal to care team and patient/family/caregiver.   - Collaborate with rehabilitation services on mobility goals if  consulted  - Perform Range of Motion 2 times a day.  - Reposition patient every 2 hours.  - Dangle patient 2 times a day  - Stand patient 2 times a day  - Ambulate patient 2 times a day  - Out of bed to chair 2 times a day   - Out of bed for meals 2 times a day  - Out of bed for toileting  - Record patient progress and toleration of activity level   Outcome: Progressing

## 2025-06-19 NOTE — PROGRESS NOTES
"Progress Note - Hospitalist   Name: Maximino Diaz . 85 y.o. male I MRN: [id number]  Unit/Bed#: -01 I Date of Admission: 6/16/2025   Date of Service: 6/19/2025 I Hospital Day: 2    Assessment & Plan  Intracranial hemorrhage (HCC)  Patient found by family at home with a LKWT of 2130 on 6/16 with garbled speech, lethargy and confusion.  CT Head: New small area of presumed intraparenchymal hemorrhage measuring up to 8 mm in the left posterior internal capsule region.   Neurology consulted in ED.  Neurosurgery consulted - no intervention  Recommend admit under stroke/ICH protocol  Goal -140  Hold AP/AC for now  6/17 Repeat CTH in 6 hour completed - no significant change in the size or appearance of the left gangliocapsular intraparenchymal hemorrhage   6/17 MRI brain with and without contrast routine - \"evolving known left gangliocapsular intraparenchymal hemorrhage\"  Repeat CTH at 24 hour - no new hemorrhage and unchanged in size from 6/17 CT scan  Routine EEG - negative  Seizure precautions  Severe protein-calorie malnutrition (HCC)  Malnutrition Findings:   Adult Malnutrition type: Chronic illness  Adult Degree of Malnutrition: Other severe protein calorie malnutrition  Malnutrition Characteristics: Fat loss, Muscle loss                  360 Statement: Chronic severe malnutrition related to likely inadequate kcal and PRO intake as evidenced by severe muscle loss to temporalis, pectoralis, deltoid, interroseous, quadriceps muscles; severe fat loss to buccals and triceps. Treated with: See consult note for EN recommendations in the event unable to initiate oral diet and medically appropriate/within pt's GOC. Recommend daily weights for nutrition monitoring.    BMI Findings:           Body mass index is 23.21 kg/m².     Stenosis of right carotid artery  CTA head/neck: Approximately 50% right cervical internal carotid artery stenosis by NASCET criteria. No evidence of large intracranial aneurysm, " high-grade stenosis, or large vessel occlusion.  Carotid US shows less than 50% stenosis bilaterally  Start aspirin/plavix when medically appropriate per Neurology.   Chronic kidney disease, stage 3a (HCC)  Lab Results   Component Value Date    EGFR 44 06/19/2025    EGFR 45 06/18/2025    EGFR 45 06/17/2025    CREATININE 1.43 (H) 06/19/2025    CREATININE 1.41 (H) 06/18/2025    CREATININE 1.42 (H) 06/17/2025   Creatinine Is personally at baseline of 1.4  Dysphagia  Currently npo and plan for swallowing study today  Speech therapy assessment cont    VTE Pharmacologic Prophylaxis: VTE Score: 5 Moderate Risk (Score 3-4) - Pharmacological DVT Prophylaxis Ordered: heparin.    Mobility:   Basic Mobility Inpatient Raw Score: 11  JH-HLM Goal: 4: Move to chair/commode  JH-HLM Achieved: 4: Move to chair/commode  JH-HLM Goal NOT achieved. Continue with multidisciplinary rounding and encourage appropriate mobility to improve upon JH-HLM goals.    Patient Centered Rounds: I performed bedside rounds with nursing staff today.   Discussions with Specialists or Other Care Team Provider: will dw neuro    Education and Discussions with Family / Patient: will update family    Current Length of Stay: 2 day(s)  Current Patient Status: Inpatient   Certification Statement: The patient will continue to require additional inpatient hospital stay due to intracranial hemorrhage  Discharge Plan: Anticipate discharge in 24-48 hrs to rehab facility.    Code Status: Level 3 - DNAR and DNI    Subjective   Patient denies any chest pain or shortness of breath.currently npo.has mittens on as he keeps picking at iv lines and franco    Objective :  Temp:  [98.2 °F (36.8 °C)-99 °F (37.2 °C)] 98.8 °F (37.1 °C)  HR:  [74-88] 80  BP: (111-150)/(56-86) 111/58  Resp:  [11-23] 14  SpO2:  [97 %-99 %] 97 %  O2 Device: None (Room air)    Body mass index is 23.21 kg/m².     Input and Output Summary (last 24 hours):     Intake/Output Summary (Last 24 hours) at  6/19/2025 1229  Last data filed at 6/19/2025 0546  Gross per 24 hour   Intake 200 ml   Output 805 ml   Net -605 ml       Physical Exam  Vitals and nursing note reviewed.   Constitutional:       Comments: Frail elderly male   HENT:      Head: Normocephalic and atraumatic.      Right Ear: External ear normal.      Left Ear: External ear normal.      Nose: Nose normal.      Mouth/Throat:      Pharynx: Oropharynx is clear.     Eyes:      Pupils: Pupils are equal, round, and reactive to light.       Cardiovascular:      Rate and Rhythm: Normal rate and regular rhythm.      Heart sounds: Normal heart sounds.   Pulmonary:      Effort: Pulmonary effort is normal.      Breath sounds: Normal breath sounds.   Abdominal:      General: Bowel sounds are normal.      Palpations: Abdomen is soft.      Tenderness: There is no abdominal tenderness.     Musculoskeletal:         General: Normal range of motion.      Cervical back: Normal range of motion and neck supple.      Comments: Mittens Noted on both hands     Skin:     General: Skin is warm and dry.      Capillary Refill: Capillary refill takes less than 2 seconds.     Neurological:      Mental Status: He is alert.      Comments: Oriented To person only.   Psychiatric:         Mood and Affect: Mood normal.           Lines/Drains:  Lines/Drains/Airways       Active Status       Name Placement date Placement time Site Days    External Urinary Catheter Small 06/18/25  1905  -- less than 1                      Telemetry:  Telemetry Orders (From admission, onward)               24 Hour Telemetry Monitoring  Continuous x 24 Hours (Telem)        Expiring   Question:  Reason for 24 Hour Telemetry  Answer:  TIA/Suspected CVA/ Confirmed CVA                     Telemetry Reviewed: Normal Sinus Rhythm  Indication for Continued Telemetry Use: No indication for continued use. Will discontinue.                Lab Results: I have reviewed the following results:   Results from last 7 days   Lab  Units 06/19/25  0522   WBC Thousand/uL 4.12*   HEMOGLOBIN g/dL 9.8*   HEMATOCRIT % 29.8*   PLATELETS Thousands/uL 195     Results from last 7 days   Lab Units 06/19/25  0522 06/17/25  0448 06/17/25  0015   SODIUM mmol/L 143   < > 140   POTASSIUM mmol/L 3.9   < > 3.5   CHLORIDE mmol/L 109*   < > 105   CO2 mmol/L 26   < > 28   BUN mg/dL 32*   < > 36*   CREATININE mg/dL 1.43*   < > 1.56*   ANION GAP mmol/L 8   < > 7   CALCIUM mg/dL 8.5   < > 8.9   ALBUMIN g/dL  --   --  3.7   TOTAL BILIRUBIN mg/dL  --   --  0.64   ALK PHOS U/L  --   --  99   ALT U/L  --   --  22   AST U/L  --   --  27   GLUCOSE RANDOM mg/dL 81   < > 104    < > = values in this interval not displayed.     Results from last 7 days   Lab Units 06/19/25  0522   INR  1.09     Results from last 7 days   Lab Units 06/16/25  2353   POC GLUCOSE mg/dl 108               Recent Cultures (last 7 days):         Imaging Results Review: I reviewed radiology reports from this admission including: CT head and MRI brain.  Other Study Results Review: EKG was reviewed.     Last 24 Hours Medication List:     Current Facility-Administered Medications:     chlorhexidine (PERIDEX) 0.12 % oral rinse 15 mL, Q12H ED    heparin (porcine) subcutaneous injection 5,000 Units, Q8H ED    labetalol (NORMODYNE) injection 5 mg, Q4H PRN    Administrative Statements   Today, Patient Was Seen By: Natty Colindres MD      **Please Note: This note may have been constructed using a voice recognition system.**

## 2025-06-19 NOTE — ASSESSMENT & PLAN NOTE
"Patient found by family at home with a LKWT of 2130 on 6/16 with garbled speech, lethargy and confusion.  CT Head: New small area of presumed intraparenchymal hemorrhage measuring up to 8 mm in the left posterior internal capsule region.   Neurology consulted in ED.  Neurosurgery consulted - no intervention  Recommend admit under stroke/ICH protocol  Goal -140  Hold AP/AC for now  6/17 Repeat CTH in 6 hour completed - no significant change in the size or appearance of the left gangliocapsular intraparenchymal hemorrhage   6/17 MRI brain with and without contrast routine - \"evolving known left gangliocapsular intraparenchymal hemorrhage\"  Repeat CTH at 24 hour - no new hemorrhage and unchanged in size from 6/17 CT scan  Routine EEG - negative  Seizure precautions  "

## 2025-06-19 NOTE — PROCEDURES
Video Swallow Study      Patient Name: Maximino Diaz Sr.  Today's Date: 6/19/2025        Past Medical History  Past Medical History[1]     Past Surgical History  Past Surgical History[2]    Summary:  Images are on PACS for review. Series 3 incorrectly labeled thin straw    Oral Phase :  Pt presented with mild oral dysphagia. Bolus control and formation were WNL. Transfer was min prolonged however suspect due to mental status and pt ongoing fatigue. Trace base of tongue residue.    Pharyngeal Phase : Pt presented with mild/mod  pharyngeal dysphagia. Swallow initiation was mildly delayed. Appeared effortful. Premature spill to the valleculae with all trials. Hyoid elevation and laryngeal excursion were WFL. Base of tongue retraction and pharyngeal constriction were reduced. Pharyngeal stripping wave diminished, increased with thicker viscosities. Vallecular and pyriform retention varied trace to mild with all trials. Pt mostly did not initiate secondary swallow to reduce residue verbal cues provided which mildly effective. PPW coating trace to mild with all trials. Epiglottic undercoat present with all consistencies administered.Silent aspiration with thin liquids. Verbal cue to cough to clear however cough very weak pt unable to clear aspirated residue.     Per Esophageal screen   Min slow motility noted with liquids and puree. All residue cleared at conclusion of study.      Observations:   Pt increased lethargy, benefited from verbal stimulation. Eyes remained closed throughout study. Pt unable to retrieve htl via straw provided via tsp.     Recommendations:  Diet: Dysphagia 1 pureed   Liquids: Nectar thick liquids   Meds:crushed with puree  Strategies:Slow rate/small sips, alternate liquids with solids, swallow prior to additional po, effortful swallow, and double swallow   Upright position  F/u ST tx: yes  Therapy Prognosis:Guarded  Prognosis considerations:age, medical  status  Full/Intermittent Supervision  Aspiration Precautions  Reflux Precautions  Consider consult with: Nutrition consider calorie count as appropriate, f/u with rehab  Results reviewed with: pt, physician,   Aspiration precautions posted.  If a dedicated assessment of the esophagus is desired, consider esophagram/barium swallow or EGD.    Goals:  Dysphagia LTG  -Patient will demonstrate safe and effective oral intake (without overt s/s significant oral/pharyngeal dysphagia including s/s penetration or aspiration) for the highest appropriate diet level.     Short Term Goals:  -Pt will tolerate Dysphagia 1/pureed diet and nectar thick thin liquid with no significant s/s oral or pharyngeal dysphagia across 1-3 diagnostic session/s    -Patient will comply with a Video/Modified Barium Swallow study for more complete assessment of swallowing anatomy/physiology/aspiration risk and to assess efficacy of treatment techniques    -Patient will complete daily oral motor exercise to increase labial strength, range of motion and coordination with min cues to 90% effectiveness to strengthen oral musculature and prevent food or liquid spillage from the oral cavity/maximize oral control with no cues needed    -Patient will complete daily oral motor exercises to increase lingual strength, range of motion and coordination with min cues to 90% effectiveness to improve bolus formation, transfer and control with minimal/no cues needed    -Patient will complete exercises to increase laryngeal rise, airway protection and pharyngeal constriction for improved swallow function with minimal/ no cues needed.     Re: Compensatory Strategies  -Patient will utilize trained compensatory strategies (eg  R/L food/straw placement, lingual sweep, use of puree/liquid to promote oral clearance) to reduce or eliminate s/s oral dysphagia with the least restrictive consistencies.    -Patient will utilize trained compensatory strategies (eg.  controlled  "bite/sip size, controlled rate, ”prep set”, neck flexion, multiple swallows, alternation of food with liquid,head turn etc.) with 80% accuracy to eliminate overt s/s penetration/aspiration with the least restrictive food/liquid consistencies    -Patient will utilize trained swallowing maneuver (e.g., supraglottic swallow, Mendelsohn maneuver, effortful swallow, etc.) with 80%  to facilitate improved airway protection,  tongue base retraction, pharyngeal constriction  and/or clearance of the bolus through the pharynx with 90% accuracy    -Patient will demonstrate independent use of recommended safe swallowing strategies during a clinician assessed meal across 1-3 diagnostic sessions.    -Patient’s caregiver will demonstrate understanding and adherence to recommended diet and use of (or prompting for use of) compensatory strategies.     -Patient will demonstrate the ability to adequately self-monitor swallowing skills and perform appropriate compensatory techniques to reduce overt s/sx of penetration/aspiration to safely tolerate least restrictive food/liquid consistencies.    Patient's goal:\" okay\"    H&P/Admit info, pertinent provider notes/procedures/studies/PMH:(copied and placed in this report)  Maximino Diaz  is a 85 y.o. who presents with a PMH of severe protein malnutrition, CKD, SBO, chronic anemia and colon cancer from home with a LWKT of 2130 on the evening of 6/16.  Patient was found by family with garbled speech, lethargy and confusion. A stroke alert was initiated in the ED and the patient was found to have an 8 mm intraparenchymal hemorrhage in the left posterior internal capsule.  N[facility] at [facility] was contacted.  The family stated to the ED physician that they have not decided on a code status but they would not opt for surgical intervention if it were needed. CCM at [facility] agreed to admission     Special Studies   CTA Stroke Alert 6/16/25 IMPRESSION:  Approximately 50% right cervical internal carotid " artery stenosis by NASCET criteria.  No evidence of large intracranial aneurysm, high-grade stenosis, or large vessel occlusion.  Redemonstrated acute intracranial hemorrhage, also seen on concurrent noncontrast CT head.  CT head wo contrast 6/17/25 IMPRESSION:  No significant change in the size or appearance of the left gangliocapsular intraparenchymal hemorrhage.  No new midline shift or mass effect. No new areas of hemorrhage.     Code Status: level 3 DNR/DNI    Previous VBS: none     Precautions :Fall     Food allergies:  No known    Current diet:  NPO    Premorbid diet:  Regular and thin   Dentition  Few natural remaining    Oral Mech  WNL   O2 requirements:  Room air    Vocal Quality/Speech WNL   Cognitive status:  Alerted to frequent stim.      Consistencies administered: Barium laden applesauce, , honey thick, nectar thick, thin liquids, Liquids were administered by cup and straw. Did not administer pill as safety precaution.     Pt was seated laterally at 90 degrees.   Pt unable to be viewed in AP position due to transfer status.                                 [1]   Past Medical History:  Diagnosis Date    Colon cancer (HCC)     GERD (gastroesophageal reflux disease)    [2]   Past Surgical History:  Procedure Laterality Date    EXPLORATORY LAPAROTOMY W/ BOWEL RESECTION N/A 4/5/2022    Procedure: LAPAROTOMY EXPLORATORY, RIGHT DWAYNE COLECTOMY WITH ANASTOMOSIS;  Surgeon: Amy Parker DO;  Location:  MAIN OR;  Service: General    HERNIA REPAIR

## 2025-06-19 NOTE — ASSESSMENT & PLAN NOTE
CTA head/neck: Approximately 50% right cervical internal carotid artery stenosis by NASCET criteria. No evidence of large intracranial aneurysm, high-grade stenosis, or large vessel occlusion.  Carotid US shows less than 50% stenosis bilaterally  Start aspirin/plavix when medically appropriate per Neurology.

## 2025-06-19 NOTE — ASSESSMENT & PLAN NOTE
Malnutrition Findings:   Adult Malnutrition type: Chronic illness  Adult Degree of Malnutrition: Other severe protein calorie malnutrition  Malnutrition Characteristics: Fat loss, Muscle loss                  360 Statement: Chronic severe malnutrition related to likely inadequate kcal and PRO intake as evidenced by severe muscle loss to temporalis, pectoralis, deltoid, interroseous, quadriceps muscles; severe fat loss to buccals and triceps. Treated with: See consult note for EN recommendations in the event unable to initiate oral diet and medically appropriate/within pt's GOC. Recommend daily weights for nutrition monitoring.    BMI Findings:           Body mass index is 23.21 kg/m².

## 2025-06-20 ENCOUNTER — TELEPHONE (OUTPATIENT)
Age: 85
End: 2025-06-20

## 2025-06-20 VITALS
SYSTOLIC BLOOD PRESSURE: 123 MMHG | DIASTOLIC BLOOD PRESSURE: 67 MMHG | HEIGHT: 60 IN | WEIGHT: 112.66 LBS | BODY MASS INDEX: 22.12 KG/M2 | TEMPERATURE: 98.1 F | HEART RATE: 85 BPM | RESPIRATION RATE: 17 BRPM | OXYGEN SATURATION: 98 %

## 2025-06-20 PROCEDURE — 99239 HOSP IP/OBS DSCHRG MGMT >30: CPT | Performed by: FAMILY MEDICINE

## 2025-06-20 PROCEDURE — 97116 GAIT TRAINING THERAPY: CPT

## 2025-06-20 PROCEDURE — 97110 THERAPEUTIC EXERCISES: CPT

## 2025-06-20 PROCEDURE — 97530 THERAPEUTIC ACTIVITIES: CPT

## 2025-06-20 PROCEDURE — 97535 SELF CARE MNGMENT TRAINING: CPT

## 2025-06-20 RX ADMIN — CHLORHEXIDINE GLUCONATE 15 ML: 1.2 RINSE ORAL at 10:44

## 2025-06-20 RX ADMIN — HEPARIN SODIUM 5000 UNITS: 5000 INJECTION INTRAVENOUS; SUBCUTANEOUS at 13:30

## 2025-06-20 RX ADMIN — HEPARIN SODIUM 5000 UNITS: 5000 INJECTION INTRAVENOUS; SUBCUTANEOUS at 06:37

## 2025-06-20 NOTE — DISCHARGE SUPPORT
Case Management Assessment & Discharge Planning Note    Patient name Maximino Diaz Sr.  Location /-01 MRN [id number]  : 1940 Date 2025       Current Admission Date: 2025  Current Admission Diagnosis:Intracranial hemorrhage (HCC)   Patient Active Problem List    Diagnosis Date Noted    Stenosis of right carotid artery 2025    Intracranial hemorrhage (HCC) 2025    Stroke-like symptoms 2025    Dysphagia 2025    Venous stasis dermatitis of both lower extremities 2025    History of colon cancer 2024    Chronic kidney disease, stage 3a (HCC) 2023    History of hemicolectomy 2022    Tinea corporis 2022    Malnutrition (HCC) 2022    Severe protein-calorie malnutrition (HCC) 2022    Colonic mass     Small bowel obstruction (HCC) 2022    Acute on chronic anemia 2022    JENSEN (acute kidney injury) (HCC) 2022      LOS (days): 3  Geometric Mean LOS (GMLOS) (days): 4.5  Days to GMLOS:1   Facility Authorization Approved  [facility] received approved auth for: SNF  Insurance: [employer]  Authorization Obtained Via: Other  Facility Name: [facility]  Approved Authorization Number: [id number]  Start of Care Date: 25  Next Review Date: 25  Submit Next Review to: fax [phone]   notified: michelle horton     Updates to authorization status will be noted in chart.    Please reach out to CM for updates on any clinical information.

## 2025-06-20 NOTE — TELEPHONE ENCOUNTER
U - [facility] - Intracranial hemorrhage     STILL ADMITTED: 6/16/2025 - present (4 days) [facility]    Mike Reed MD  [facility] Discharge  This patient will need follow-up in in 6 weeks with neurovascular team for Other in 60 minute appointment. They will not require outpatient neurological testing.

## 2025-06-20 NOTE — ASSESSMENT & PLAN NOTE
"Patient found by family at home with a LKWT of 2130 on 6/16 with garbled speech, lethargy and confusion.  CT Head: New small area of presumed intraparenchymal hemorrhage measuring up to 8 mm in the left posterior internal capsule region.   Neurology consulted in ED.  Neurosurgery consulted - no intervention  Recommend admit under stroke/ICH protocol  Goal -140  Hold AP/AC for now.  No antiplatelets or anticoagulants recommended for at least 4 weeks at this time  6/17 Repeat CTH in 6 hour completed - no significant change in the size or appearance of the left gangliocapsular intraparenchymal hemorrhage   6/17 MRI brain with and without contrast routine - \"evolving known left gangliocapsular intraparenchymal hemorrhage\"  Repeat CTH at 24 hour - no new hemorrhage and unchanged in size from 6/17 CT scan  Routine EEG - negative  Seizure precautions  "

## 2025-06-20 NOTE — ASSESSMENT & PLAN NOTE
Prior to his fall and admission he was eating regular food.  Swallowing study done on 6/19/2025.  The patient was n.p.o. and now placed on puréed diet with nectar thick and tolerating well.  Continue to advance diet as tolerated over the next few days with speech therapies recommendations.

## 2025-06-20 NOTE — PLAN OF CARE
Problem: PHYSICAL THERAPY ADULT  Goal: Performs mobility at highest level of function for planned discharge setting.  See evaluation for individualized goals.  Description: Treatment/Interventions: Functional transfer training, LE strengthening/ROM, Elevations, Therapeutic exercise, Endurance training, Patient/family training, Cognitive reorientation, Equipment eval/education, Bed mobility, Gait training, Compensatory technique education, Spoke to nursing, OT          See flowsheet documentation for full assessment, interventions and recommendations.  Outcome: Progressing  Note: Prognosis: Fair  Problem List: Decreased strength, Decreased range of motion, Decreased endurance, Impaired balance, Decreased mobility, Decreased safety awareness, Impaired hearing, Decreased cognition  Assessment: Pt seen for PT treatment session this date with interventions consisting of bed mobility tasks, transfer training, seated TE, gait training, toilet transfers, and education provided as needed for safety and direction to improve functional mobility, safety awareness, and activity tolerance. Pt agreeable to PT treatment session upon arrival, pt found resting in bed. At end of session, pt left in bed with bed alarm activated, SCD's applied, BLE elevated on pillow to free float B heels, and with all needs in reach. In comparison to previous session, pt with improvements in ambulation distance, amount of assistance needed for transfers, and amount os activity  tolerated in one session . Continue to recommend Level II (Moderate Resource Intensity) at time of d/c in order to maximize pt's functional independence and safety w/ mobility. Pt continues to be functioning below baseline level. PT will continue to see pt while here in order to address the deficits listed above and provide interventions consistent w/ POC in effort to achieve STGs.  Barriers to Discharge: Other (Comment)  Barriers to Discharge Comments: current assist level  for mobility, fall risk, recent fall, medical diagnosis, cognitive deficits  Rehab Resource Intensity Level, PT: II (Moderate Resource Intensity)    See flowsheet documentation for full assessment.

## 2025-06-20 NOTE — PROGRESS NOTES
Diet initiated. Pt eating 25-50% of meals per nursing flowsheets/discussion with RN. Had about 25% of pureed waffle and thickened apple juice this AM. Pt reports he has an appetite and enjoying meals. He is agreeable to supplementation.     RD does not have protocol to order supplements, pended order for mightyshake daily and magic cup BID given malnutrition and inadequate oral intake at this time. MD to release supplement order if agreeable.     Diet consistency per SLP/MD, no additional diet restrictions at this time.

## 2025-06-20 NOTE — ASSESSMENT & PLAN NOTE
CTA head/neck: Approximately 50% right cervical internal carotid artery stenosis by NASCET criteria. No evidence of large intracranial aneurysm, high-grade stenosis, or large vessel occlusion.  Carotid US shows less than 50% stenosis bilaterally  No antiplatelets or anticoagulants recommended at this time

## 2025-06-20 NOTE — CASE MANAGEMENT
Case Management Discharge Planning Note    Patient name Maximino Diaz Sr.  Location /-01 MRN [id number]  : 1940 Date 2025       Current Admission Date: 2025  Current Admission Diagnosis:Intracranial hemorrhage (HCC)   Patient Active Problem List    Diagnosis Date Noted    Stenosis of right carotid artery 2025    Intracranial hemorrhage (HCC) 2025    Stroke-like symptoms 2025    Dysphagia 2025    Venous stasis dermatitis of both lower extremities 2025    History of colon cancer 2024    Chronic kidney disease, stage 3a (HCC) 2023    History of hemicolectomy 2022    Tinea corporis 2022    Malnutrition (HCC) 2022    Severe protein-calorie malnutrition (HCC) 2022    Colonic mass     Small bowel obstruction (HCC) 2022    Acute on chronic anemia 2022    JENSEN (acute kidney injury) (HCC) 2022      LOS (days): 3  Geometric Mean LOS (GMLOS) (days): 4.5  Days to GMLOS:1.2     OBJECTIVE:  Risk of Unplanned Readmission Score: 14.85         Current admission status: Inpatient   Preferred Pharmacy:   [facility]/pharmacy #[id number] - [place], [place] - [address] [facility]  [address] [facility] [address]  Phone: [phone] Fax: [phone]    Primary Care Provider: Daniel Martins DO    Primary Insurance: [facility]  Secondary Insurance:     DISCHARGE DETAILS:       Case was discussed in multi disciplinary rounds. Attending the rounds were the provider, Nursing, Care Management, and therapy ( OT)   Plan is D/C tamera, SLP/VBS pending  DC recommendation is STR - [place] [facility].   CM will continue to follow.

## 2025-06-20 NOTE — DISCHARGE SUMMARY
"Discharge Summary - Hospitalist   Name: Maximino Diaz . 85 y.o. male I MRN: [id number]  Unit/Bed#: -01 I Date of Admission: 6/16/2025   Date of Service: 6/20/2025 I Hospital Day: 3     Assessment & Plan  Intracranial hemorrhage (HCC)  Patient found by family at home with a LKWT of 2130 on 6/16 with garbled speech, lethargy and confusion.  CT Head: New small area of presumed intraparenchymal hemorrhage measuring up to 8 mm in the left posterior internal capsule region.   Neurology consulted in ED.  Neurosurgery consulted - no intervention  Recommend admit under stroke/ICH protocol  Goal -140  Hold AP/AC for now.  No antiplatelets or anticoagulants recommended for at least 4 weeks at this time  6/17 Repeat CTH in 6 hour completed - no significant change in the size or appearance of the left gangliocapsular intraparenchymal hemorrhage   6/17 MRI brain with and without contrast routine - \"evolving known left gangliocapsular intraparenchymal hemorrhage\"  Repeat CTH at 24 hour - no new hemorrhage and unchanged in size from 6/17 CT scan  Routine EEG - negative  Seizure precautions  Severe protein-calorie malnutrition (HCC)  Malnutrition Findings:   Adult Malnutrition type: Chronic illness  Adult Degree of Malnutrition: Other severe protein calorie malnutrition  Malnutrition Characteristics: Fat loss, Muscle loss                  360 Statement: Chronic severe malnutrition related to likely inadequate kcal and PRO intake as evidenced by severe muscle loss to temporalis, pectoralis, deltoid, interroseous, quadriceps muscles; severe fat loss to buccals and triceps. Treated with: See consult note for EN recommendations in the event unable to initiate oral diet and medically appropriate/within pt's GOC. Recommend daily weights for nutrition monitoring.    BMI Findings:           Body mass index is 22 kg/m².     Stenosis of right carotid artery  CTA head/neck: Approximately 50% right cervical internal " carotid artery stenosis by NASCET criteria. No evidence of large intracranial aneurysm, high-grade stenosis, or large vessel occlusion.  Carotid US shows less than 50% stenosis bilaterally  No antiplatelets or anticoagulants recommended at this time    Chronic kidney disease, stage 3a (HCC)  Lab Results   Component Value Date    EGFR 44 06/19/2025    EGFR 45 06/18/2025    EGFR 45 06/17/2025    CREATININE 1.43 (H) 06/19/2025    CREATININE 1.41 (H) 06/18/2025    CREATININE 1.42 (H) 06/17/2025   Creatinine Is personally at baseline of 1.4  Dysphagia  Prior to his fall and admission he was eating regular food.  Swallowing study done on 6/19/2025.  The patient was n.p.o. and now placed on puréed diet with nectar thick and tolerating well.  Continue to advance diet as tolerated over the next few days with speech therapies recommendations.     Medical Problems       Resolved Problems  Date Reviewed: 6/17/2025   None       Discharging Physician / Practitioner: Natty Colindres MD  PCP: Daniel Martins DO  Admission Date:   Admission Orders (From admission, onward)       Ordered        06/17/25 0230  INPATIENT ADMISSION  Once                          Discharge Date: 06/20/25    Next Steps for Physician/AP Assuming Care:  Speech therapy and advance diet as tolerated    Test Results Pending at Discharge (will require follow up):  none    Medication Changes for Discharge & Rationale:   See after visit summary for reconciled discharge medications provided to patient and/or family.     Consultations During Hospital Stay:  Neurology, neurosurgery, physical therapy and Occupational Therapy    Procedures Performed:   none    Significant Findings / Test Results:   CT head wo contrast  Result Date: 6/18/2025  Impression: Stable 9 mm hemorrhage in the left corona radiata. No mass effect or evidence of new hemorrhage.. Workstation performed: [id number]     MRI brain wo contrast  Result Date: 6/17/2025  Impression: Markedly  motion-degraded, with significant portions of the examination nondiagnostic/essentially nondiagnostic. Within these confines, no acute large territory infarct, significant mass effect or midline shift. Evolving known left gangliocapsular intraparenchymal hemorrhage. Recommend repeat examination when clinically appropriate/tolerable. The study was marked in EPIC for immediate notification. Workstation performed: [id number]     X-ray chest 1 view portable  Result Date: 6/17/2025  Impression: Left basilar retrocardiac atelectasis and/or scarring without other acute cardiopulmonary findings. Resident: Wesley Santiago I, the attending radiologist, have reviewed the images and agree with the final report above. Workstation performed: [id number]     CT head wo contrast  Result Date: 6/17/2025  Impression: No significant change in the size or appearance of the left gangliocapsular intraparenchymal hemorrhage. No new midline shift or mass effect. No new areas of hemorrhage. Workstation performed: [id number]     CTA stroke alert (head/neck)  Result Date: 6/17/2025  Impression: Approximately 50% right cervical internal carotid artery stenosis by NASCET criteria. No evidence of large intracranial aneurysm, high-grade stenosis, or large vessel occlusion. Redemonstrated acute intracranial hemorrhage, also seen on concurrent noncontrast CT head. I discussed this study via [employer] secure chat with Dr. Stefan Child on  6/17/2024 12:16 AM Workstation performed: [id number]     CT stroke alert brain  Result Date: 6/17/2025  Impression: New small area of presumed intraparenchymal hemorrhage measuring up to 8 mm in the left posterior internal capsule region. I discussed this study via [employer] secure chat with Dr. Stefan Child on  6/17/2024 12:16 AM Workstation performed: [id number]      Incidental Findings:       Hospital Course:   Maximino Diaz Sr. is a 85 y.o. male patient who originally presented to the hospital on 6/16/2025 due to fall at  home and also found by family with garbled speech lethargy and confusion initially a stroke alert was called however CT head showed 8 mm intraparenchymal hemorrhage in the left posterior internal capsule.  Stroke Pathway was canceled and patient was monitored closely in the ICU and follow-up imaging showed evolving left gangliocapsular intraparenchymal hemorrhage.  Patient was initially quite confused and lethargic and is now slowly and gradually improving.  He was started on a puréed and nectar thick diet and will now try to get him to rehab and see how he evolves.  Prior to this episode patient was living at home with his son  No antiplatelets or anticoagulants for at least the next 4 weeks patient was not on them prior to admission either          Please see above list of diagnoses and related plan for additional information.     Discharge Day Visit / Exam:   Subjective: He is oriented to his name otherwise does not answer other questions much.  Vitals: Blood Pressure: 139/73 (06/20/25 1000)  Pulse: 76 (06/20/25 1000)  Temperature: 98.5 °F (36.9 °C) (06/20/25 0716)  Temp Source: Temporal (06/20/25 0716)  Respirations: 16 (06/20/25 0716)  Height: 5' (152.4 cm) (06/18/25 0952)  Weight - Scale: 51.1 kg (112 lb 10.5 oz) (06/20/25 0635)  SpO2: 99 % (06/20/25 1000)  Physical Exam  Vitals and nursing note reviewed.   Constitutional:       Appearance: Normal appearance.   HENT:      Head: Normocephalic and atraumatic.      Right Ear: External ear normal.      Left Ear: External ear normal.      Nose: Nose normal.      Mouth/Throat:      Pharynx: Oropharynx is clear.     Eyes:      Pupils: Pupils are equal, round, and reactive to light.       Cardiovascular:      Rate and Rhythm: Normal rate and regular rhythm.      Heart sounds: Normal heart sounds.   Pulmonary:      Effort: Pulmonary effort is normal.      Breath sounds: Normal breath sounds.   Abdominal:      General: Bowel sounds are normal.      Palpations: Abdomen  is soft.      Tenderness: There is no abdominal tenderness.     Musculoskeletal:         General: Normal range of motion.      Cervical back: Normal range of motion and neck supple.     Skin:     General: Skin is warm and dry.      Capillary Refill: Capillary refill takes less than 2 seconds.     Neurological:      General: No focal deficit present.      Mental Status: He is alert.      Comments: Oriented to person only   Psychiatric:         Mood and Affect: Mood normal.            Discussion with Family: Update daughter    Discharge instructions/Information to patient and family:   See after visit summary for information provided to patient and family.      Provisions for Follow-Up Care:  See after visit summary for information related to follow-up care and any pertinent home health orders.      Mobility at time of Discharge:   Basic Mobility Inpatient Raw Score: 12  JH-HLM Goal: 4: Move to chair/commode  JH-HLM Achieved: 7: Walk 25 feet or more  HLM Goal NOT achieved. Continue to encourage mobility in post discharge setting.     Disposition:   Other Skilled Nursing Facility at      Planned Readmission: none    Administrative Statements   Discharge Statement:  I have spent a total time of 35 minutes in caring for this patient on the day of the visit/encounter. .    **Please Note: This note may have been constructed using a voice recognition system**

## 2025-06-20 NOTE — PLAN OF CARE
Problem: PAIN - ADULT  Goal: Verbalizes/displays adequate comfort level or baseline comfort level  Description: Interventions:  - Encourage patient to monitor pain and request assistance  - Assess pain using appropriate pain scale  - Administer analgesics as ordered based on type and severity of pain and evaluate response  - Implement non-pharmacological measures as appropriate and evaluate response  - Consider cultural and social influences on pain and pain management  - Notify physician/advanced practitioner if interventions unsuccessful or patient reports new pain  - Educate patient/family on pain management process including their role and importance of  reporting pain   - Provide non-pharmacologic/complimentary pain relief interventions  Outcome: Progressing     Problem: INFECTION - ADULT  Goal: Absence or prevention of progression during hospitalization  Description: INTERVENTIONS:  - Assess and monitor for signs and symptoms of infection  - Monitor lab/diagnostic results  - Monitor all insertion sites, i.e. indwelling lines, tubes, and drains  - Monitor endotracheal if appropriate and nasal secretions for changes in amount and color  - Eckert appropriate cooling/warming therapies per order  - Administer medications as ordered  - Instruct and encourage patient and family to use good hand hygiene technique  - Identify and instruct in appropriate isolation precautions for identified infection/condition  Outcome: Progressing  Goal: Absence of fever/infection during neutropenic period  Description: INTERVENTIONS:  - Monitor WBC  - Perform strict hand hygiene  - Limit to healthy visitors only  - No plants, dried, fresh or silk flowers with moser in patient room  Outcome: Progressing     Problem: SAFETY ADULT  Goal: Patient will remain free of falls  Description: INTERVENTIONS:  - Educate patient/family on patient safety including physical limitations  - Instruct patient to call for assistance with activity   -  Consider consulting OT/PT to assist with strengthening/mobility based on AM PAC & JH-HLM score  - Consult OT/PT to assist with strengthening/mobility   - Keep Call bell within reach  - Keep bed low and locked with side rails adjusted as appropriate  - Keep care items and personal belongings within reach  - Initiate and maintain comfort rounds  - Make Fall Risk Sign visible to staff  - Offer Toileting every 2 Hours, in advance of need  - Initiate/Maintain bed alarm  - Obtain necessary fall risk management equipment:   - Apply yellow socks and bracelet for high fall risk patients  - Consider moving patient to room near nurses station  Outcome: Progressing  Goal: Maintain or return to baseline ADL function  Description: INTERVENTIONS:  -  Assess patient's ability to carry out ADLs; assess patient's baseline for ADL function and identify physical deficits which impact ability to perform ADLs (bathing, care of mouth/teeth, toileting, grooming, dressing, etc.)  - Assess/evaluate cause of self-care deficits   - Assess range of motion  - Assess patient's mobility; develop plan if impaired  - Assess patient's need for assistive devices and provide as appropriate  - Encourage maximum independence but intervene and supervise when necessary  - Involve family in performance of ADLs  - Assess for home care needs following discharge   - Consider OT consult to assist with ADL evaluation and planning for discharge  - Provide patient education as appropriate  - Monitor functional capacity and physical performance, use of AM PAC & JH-HLM   - Monitor gait, balance and fatigue with ambulation    Outcome: Progressing  Goal: Maintains/Returns to pre admission functional level  Description: INTERVENTIONS:  - Perform AM-PAC 6 Click Basic Mobility/ Daily Activity assessment daily.  - Set and communicate daily mobility goal to care team and patient/family/caregiver.   - Collaborate with rehabilitation services on mobility goals if  consulted  - Perform Range of Motion 2 times a day.  - Reposition patient every 2 hours.  - Dangle patient 2 times a day  - Stand patient 2 times a day  - Ambulate patient 2 times a day  - Out of bed to chair 2 times a day   - Out of bed for meals 2 times a day  - Out of bed for toileting  - Record patient progress and toleration of activity level   Outcome: Progressing     Problem: DISCHARGE PLANNING  Goal: Discharge to home or other facility with appropriate resources  Description: INTERVENTIONS:  - Identify barriers to discharge w/patient and caregiver  - Arrange for needed discharge resources and transportation as appropriate  - Identify discharge learning needs (meds, wound care, etc.)  - Arrange for interpretive services to assist at discharge as needed  - Refer to Case Management Department for coordinating discharge planning if the patient needs post-hospital services based on physician/advanced practitioner order or complex needs related to functional status, cognitive ability, or social support system  Outcome: Progressing     Problem: Knowledge Deficit  Goal: Patient/family/caregiver demonstrates understanding of disease process, treatment plan, medications, and discharge instructions  Description: Complete learning assessment and assess knowledge base.  Interventions:  - Provide teaching at level of understanding  - Provide teaching via preferred learning methods  Outcome: Progressing     Problem: Prexisting or High Potential for Compromised Skin Integrity  Goal: Skin integrity is maintained or improved  Description: INTERVENTIONS:  - Identify patients at risk for skin breakdown  - Assess and monitor skin integrity including under and around medical devices   - Assess and monitor nutrition and hydration status  - Monitor labs  - Assess for incontinence   - Turn and reposition patient  - Assist with mobility/ambulation  - Relieve pressure over jovani prominences   - Avoid friction and shearing  - Provide  appropriate hygiene as needed including keeping skin clean and dry  - Evaluate need for skin moisturizer/barrier cream  - Collaborate with interdisciplinary team  - Patient/family teaching  - Consider wound care consult    Assess:  - Review Madhu scale daily  - Clean and moisturize skin every shift  - Inspect skin when repositioning, toileting, and assisting with ADLS  - Assess extremities for adequate circulation and sensation     Bed Management:  - Have minimal linens on bed & keep smooth, unwrinkled  - Change linens as needed when moist or perspiring  - Avoid sitting or lying in one position for more than 2 hours while in bed?Keep HOB at 30degrees   - Toileting:  - Offer bedside commode  - Assess for incontinence every 2 hours  - Use incontinent care products after each incontinent episode such as cream    Activity:  - Mobilize patient 3 times a day  - Encourage activity and walks on unit  - Encourage or provide ROM exercises   - Turn and reposition patient every 2 Hours  - Use appropriate equipment to lift or move patient in bed  - Instruct/ Assist with weight shifting every 2 hours when out of bed in chair  - Consider limitation of chair time 2 hour intervals    Skin Care:  - Avoid use of baby powder, tape, friction and shearing, hot water or constrictive clothing  - Relieve pressure over bony prominences using pressure redistribution  - Do not massage red bony areas    Next Steps:  - Teach patient strategies to minimize risks such as   - Consider consults to  interdisciplinary teams such as   Outcome: Progressing     Problem: Nutrition/Hydration-ADULT  Goal: Nutrient/Hydration intake appropriate for improving, restoring or maintaining nutritional needs  Description: Monitor and assess patient's nutrition/hydration status for malnutrition. Collaborate with interdisciplinary team and initiate plan and interventions as ordered.  Monitor patient's weight and dietary intake as ordered or per policy. Utilize  nutrition screening tool and intervene as necessary. Determine patient's food preferences and provide high-protein, high-caloric foods as appropriate.     INTERVENTIONS:  - Monitor oral intake, urinary output, labs, and treatment plans  - Assess nutrition and hydration status and recommend course of action  - Evaluate amount of meals eaten  - Assist patient with eating if necessary   - Allow adequate time for meals  - Recommend/ encourage appropriate diets, oral nutritional supplements, and vitamin/mineral supplements  - Order, calculate, and assess calorie counts as needed  - Recommend, monitor, and adjust tube feedings and TPN/PPN based on assessed needs  - Assess need for intravenous fluids  - Provide specific nutrition/hydration education as appropriate  - Include patient/family/caregiver in decisions related to nutrition  Outcome: Progressing     Problem: SAFETY,RESTRAINT: NV/NON-SELF DESTRUCTIVE BEHAVIOR  Goal: Remains free of harm/injury (restraint for non violent/non self-detsructive behavior)  Description: INTERVENTIONS:  - Instruct patient/family regarding restraint use   - Assess and monitor physiologic and psychological status   - Provide interventions and comfort measures to meet assessed patient needs   - Identify and implement measures to help patient regain control  - Assess readiness for release of restraint   Outcome: Progressing  Goal: Returns to optimal restraint-free functioning  Description: INTERVENTIONS:  - Assess the patient's behavior and symptoms that indicate continued need for restraint  - Identify and implement measures to help patient regain control  - Assess readiness for release of restraint   Outcome: Progressing

## 2025-06-20 NOTE — OCCUPATIONAL THERAPY NOTE
Occupational Therapy Progress Note     Patient Name: Maximino Diaz Sr.  Today's Date: 6/20/2025  Problem List  Principal Problem:    Intracranial hemorrhage (HCC)  Active Problems:    Severe protein-calorie malnutrition (HCC)    Stenosis of right carotid artery    Chronic kidney disease, stage 3a (HCC)    Dysphagia          06/20/25 1121   OT Last Visit   OT Visit Date 06/20/25   Note Type   Note Type Treatment   Pain Assessment   Pain Assessment Tool 0-10   Pain Score No Pain   Restrictions/Precautions   Weight Bearing Precautions Per Order No   Other Precautions Cognitive;Chair Alarm;Bed Alarm;Fall Risk;Hard of hearing;Multiple lines   ADL   Where Assessed Commode   Eating Assistance 5  Supervision/Setup   Eating Comments nursing to provide SPV   Grooming Assistance 4  Minimal Assistance   Grooming Comments Pt brushed teeth with some assistance for thoroughness   UB Bathing Assistance 4  Minimal Assistance   UB Bathing Comments using bath wipes, assisted for thoroughness   LB Bathing Assistance 2  Maximal Assistance   LB Bathing Comments Pt washed legs, required assistance with B feet, perineal area, and buttocks   UB Dressing Assistance 3  Moderate Assistance   UB Dressing Comments donning/doffing gown, assisted with sleeves due to cognitive impairments   LB Dressing Assistance 2  Maximal Assistance   LB Dressing Comments Pt able to doff B socks, required assistance donning/doffing brief and donning B socks   Toileting Assistance  2  Maximal Assistance   Toileting Comments voiding trial, therapist assisted with all tasks.   Bed Mobility   Supine to Sit 5  Supervision   Additional items HOB elevated;Increased time required;Verbal cues   Transfers   Sit to Stand 4  Minimal assistance   Additional items Assist x 1;Increased time required;Verbal cues   Stand to Sit 4  Minimal assistance   Additional items Assist x 1;Increased time required;Verbal cues   Stand pivot 3  Moderate assistance   Additional items  Assist x 1;Increased time required;Verbal cues   Toilet transfer 3  Moderate assistance   Additional items Assist x 1;Increased time required;Verbal cues;Commode   Additional Comments Pt completed stand and turn transfer with Mod A and ww with asisstance to maintain balance due to narrow base of support and decreased motor control of legs.   Functional Mobility   Functional Mobility 3  Moderate assistance   Additional Comments Pt completed stand and turn transfer with Mod A and ww with asisstance to maintain balance due to narrow base of support and decreased motor control of legs.   Cognition   Overall Cognitive Status Impaired   Arousal/Participation Responsive;Cooperative   Attention Within functional limits   Orientation Level Oriented to person;Disoriented to place;Disoriented to time;Disoriented to situation  (time: May 2025)   Memory Decreased recall of precautions;Decreased recall of recent events;Decreased short term memory   Following Commands Follows one step commands with increased time or repetition   Comments Pt soft spoken and slow to respond.   Activity Tolerance   Activity Tolerance Other (Comment)  (cognitive impairments)   Medical Staff Made Aware DANUTA Naik   Assessment   Assessment Pt completed OT tx session #2 focused on self-care ADLs and functional mobility. Pt alert and agreeable to participate. Pt demonstrated improvements in engagement in self-care tasks, command following, and functional transfers this session but continues to be limited by cognitive impairments, poor initiation of self-care tasks, and gait pattern/steadiness while standing. Pt currently completing UB ADLs @ Mod A, LB ADLs @ Max A, and functional mobility with RW @ Mod A. Pt demonstrating good participation and good potential to achieve goals but is currently demonstrating deficits in decrease activity tolerance, decrease standing balance, decrease sitting balance, decrease performance during ADL tasks, decrease  cognition, decrease safety awareness , decrease generalized strength, decrease activity engagement, decrease performance during functional transfers, high fall risk, and limited insight to deficits. Continue to recommend moderate resource intensity upon discharge to increase safety and independence in ADL tasks and functional mobility.   Plan   Treatment Interventions ADL retraining;Functional transfer training;UE strengthening/ROM;Endurance training;Patient/family training;Cognitive reorientation;Equipment evaluation/education;Compensatory technique education;Energy conservation;Activityengagement   Goal Expiration Date 07/01/25   OT Treatment Day 2   OT Frequency 2-3x/wk   Discharge Recommendation   Rehab Resource Intensity Level, OT II (Moderate Resource Intensity)   AM-PAC Daily Activity Inpatient   Lower Body Dressing 2   Bathing 2   Toileting 2   Upper Body Dressing 2   Grooming 3   Eating 3   Daily Activity Raw Score 14   Daily Activity Standardized Score (Calc for Raw Score >=11) 33.39   Turning Head Towards Sound 3   Follow Simple Instructions 2   Low Function Daily Activity Raw Score 19   Low Function Daily Activity Standardized Score  31.34   AM-[facility] Applied Cognition Inpatient   Following a Speech/Presentation 2   Understanding Ordinary Conversation 2   Taking Medications 1   Remembering Where Things Are Placed or Put Away 1   Remembering List of 4-5 Errands 1   Taking Care of Complicated Tasks 1   Applied Cognition Raw Score 8   Applied Cognition Standardized Score 19.32   End of Consult   Education Provided Yes   Patient Position at End of Consult Bedside chair;Bed/Chair alarm activated   Nurse Communication Nurse aware of consult        06/20/25 1132   Vitals   Pulse 78   Blood Pressure 138/69   Oxygen Therapy   SpO2 98 %   O2 Device None (Room air)     The patient's raw score on the AM-PAC Daily Activity Inpatient Short Form is 14. A raw score of less than 19 suggests the patient may benefit from  discharge to post-acute rehabilitation services. Please refer to the recommendation of the Occupational Therapist for safe discharge planning.    Pt goals to be met by 7/1/2025:     Pt will demonstrate ability to complete grooming/hygiene tasks @ min assist after set-up.  Pt will demonstrate ability to complete supine<>sit @ min assist in order to increase safety and independence during ADL tasks.  Pt will demonstrate ability to complete UB ADLs including washing/dressing @ min assist in order to increase performance and participation during meaningful tasks  Pt will demonstrate ability to complete LB dressing @ mod assist in order to increase safety and independence during meaningful tasks.   Pt will demonstrate ability to gus/doff socks/shoes while sitting EOB/chair @ mod assist in order to increase safety and independence during meaningful tasks.   Pt will demonstrate ability to complete toileting tasks including CM and pericare @ mod assist in order to increase safety and independence during meaningful tasks.  Pt will demonstrate ability to complete EOB, chair, toilet/commode transfers @ min assist in order to increase performance and participation during functional tasks.  Pt will demonstrate ability to stand for 3 minutes while maintaining Poor+ balance with use of RW for UB support.  Pt will demonstrate ability to tolerate 20 minute OT session with no vc'ing for deep breathing or use of energy conservation techniques in order to increase activity tolerance during functional tasks.   Pt will demonstrate Good attention and participation in continued evaluation of functional ambulation house hold distances in order to assist with safe d/c planning.  Pt will attend to continued cognitive assessments 100% of the time in order to provide most appropriate d/c recommendations.   Pt will follow 100% simple 1-step commands and be A&O x2 consistently with environmental cues to increase participation in functional  activities.     Charly Albright, OTR/L

## 2025-06-20 NOTE — DISCHARGE SUPPORT
Case Management Assessment & Discharge Planning Note    Patient name Maximino Diaz Sr.  Location /-01 MRN [id number]  : 1940 Date 2025       Current Admission Date: 2025  Current Admission Diagnosis:Intracranial hemorrhage (HCC)   Patient Active Problem List    Diagnosis Date Noted    Stenosis of right carotid artery 2025    Intracranial hemorrhage (HCC) 2025    Stroke-like symptoms 2025    Dysphagia 2025    Venous stasis dermatitis of both lower extremities 2025    History of colon cancer 2024    Chronic kidney disease, stage 3a (HCC) 2023    History of hemicolectomy 2022    Tinea corporis 2022    Malnutrition (HCC) 2022    Severe protein-calorie malnutrition (HCC) 2022    Colonic mass     Small bowel obstruction (HCC) 2022    Acute on chronic anemia 2022    JENSEN (acute kidney injury) (HCC) 2022      LOS (days): 3  Geometric Mean LOS (GMLOS) (days): 4.5  Days to GMLOS:1.1   Facility Authorization Initiated  DC Support Center received request for auth from : Venita horton  Authorization Request Submitted for: SNF  Requested Start of Care Date: 25  Facility Name: [facility]  Facility NPI: [id number]  Facility MD: Barb Covarrubias  [facility] MD NPI: [id number]  Authorization initiated by contacting insurance: [employer]  Via: [employer]  Clinicals submitted via: Portal Attachment  Pending reference #: [id number]     Updates to authorization status will be noted in chart.    Please reach out to CM for updates on any clinical information.

## 2025-06-20 NOTE — CASE MANAGEMENT
Case Management Discharge Planning Note    Patient name Maximino Diaz Sr.  Location /-01 MRN [id number]  : 1940 Date 2025       Current Admission Date: 2025  Current Admission Diagnosis:Intracranial hemorrhage (HCC)   Patient Active Problem List    Diagnosis Date Noted    Stenosis of right carotid artery 2025    Intracranial hemorrhage (HCC) 2025    Stroke-like symptoms 2025    Dysphagia 2025    Venous stasis dermatitis of both lower extremities 2025    History of colon cancer 2024    Chronic kidney disease, stage 3a (HCC) 2023    History of hemicolectomy 2022    Tinea corporis 2022    Malnutrition (HCC) 2022    Severe protein-calorie malnutrition (HCC) 2022    Colonic mass     Small bowel obstruction (HCC) 2022    Acute on chronic anemia 2022    JENSEN (acute kidney injury) (HCC) 2022      LOS (days): 3  Geometric Mean LOS (GMLOS) (days): 4.5  Days to GMLOS:1     OBJECTIVE:  Risk of Unplanned Readmission Score: 14.89         Current admission status: Inpatient   Preferred Pharmacy:   [facility]/pharmacy #[id number] - [place], [place] - [address] [facility]  [address] [facility] [address]  Phone: [phone] Fax: [phone]    Primary Care Provider: Daniel Martins DO    [facility] Insurance: [facility]  Secondary Insurance:     DISCHARGE DETAILS:    Discharge planning discussed with:: patient, daughter  Freedom of Choice: Yes  Comments - Freedom of Choice: [facility]  CM contacted family/caregiver?: Yes  Were Treatment Team discharge recommendations reviewed with patient/caregiver?: Yes  Did patient/caregiver verbalize understanding of patient care needs?: Yes  Were patient/caregiver advised of the risks associated with not following Treatment Team discharge recommendations?: Yes    Contacts  Patient Contacts: Yolanda (daughter)  Relationship to Patient::  Family  Contact Method: Phone  Phone Number: see facesheet  Reason/Outcome: Discharge Planning    Requested Home Health Care         Is the patient interested in HHC at discharge?: No    DME Referral Provided  Referral made for DME?: No         Would you like to participate in our [facility] service program?  : No - Declined    Treatment Team Recommendation: Short Term Rehab  Expected Discharge Disposition: Skilled Nursing Facility     Transport at Discharge : Wheelchair van     Number/Name of Dispatcher: [employer]     ETA of Transport (Date): 06/20/25  ETA of Transport (Time): 1500              IMM Given (Date):: 06/20/25  IMM Given to:: Patient  Family notified:: Reviewed with daughter by phone         CM informed patient and daughter of auth received for the [facility].   CM spoke to patient at the bedside/daughter by phone, reviewed DC IMM with patient and informed that patient can stay an additional 4 hours for reconsidering appealing the discharge as the medicare rights were review on the day of discharge. Pt verbalized understanding and feels ready to go home and does not intend to stay 4 hours to reconsider.  IMM conversations documented.   Discussed with patient that transportation via  is not covered by insurance and patient will be billed for this service. Patient verbalized understanding. Daughter requests this for safe DC transition.     CM provided [facility] of auth and DC today. They are able to receive patient.    CM scheduled transportation through [facility] for 3pm by [facility]. Confirmation is pending. CM informed DR, facility, family and bedside nurse.      Case was discussed in multi disciplinary rounds. Attending the rounds were the provider, Nursing, Care Management, RT, and therapy (OT).   Plan is patient medically stable for DC.  DC recommendation is [facility] -  to get updated therapy notes and submit auth. As noted above auth received and patient  DC planned for today.    CM to follow patient's care and discharge needs.

## 2025-06-20 NOTE — PLAN OF CARE
Problem: OCCUPATIONAL THERAPY ADULT  Goal: Performs self-care activities at highest level of function for planned discharge setting.  See evaluation for individualized goals.  Description: Treatment Interventions: ADL retraining, Functional transfer training, UE strengthening/ROM, Endurance training, Cognitive reorientation, Patient/family training, Neuromuscular reeducation, Equipment evaluation/education, Fine motor coordination activities, Compensatory technique education, Continued evaluation, Energy conservation, Activityengagement, Cardiac education          See flowsheet documentation for full assessment, interventions and recommendations.   Outcome: Progressing  Note: Limitation: Decreased ADL status, Decreased UE ROM, Decreased UE strength, Decreased Safe judgement during ADL, Decreased cognition, Decreased endurance, Decreased fine motor control, Decreased self-care trans, Decreased high-level ADLs  Prognosis: Guarded  Assessment: Pt completed OT tx session #2 focused on self-care ADLs and functional mobility. Pt alert and agreeable to participate. Pt demonstrated improvements in engagement in self-care tasks, command following, and functional transfers this session but continues to be limited by cognitive impairments, poor initiation of self-care tasks, and gait pattern/steadiness while standing. Pt currently completing UB ADLs @ Mod A, LB ADLs @ Max A, and functional mobility with RW @ Mod A. Pt demonstrating good participation and good potential to achieve goals but is currently demonstrating deficits in decrease activity tolerance, decrease standing balance, decrease sitting balance, decrease performance during ADL tasks, decrease cognition, decrease safety awareness , decrease generalized strength, decrease activity engagement, decrease performance during functional transfers, high fall risk, and limited insight to deficits. Continue to recommend moderate resource intensity upon discharge to  increase safety and independence in ADL tasks and functional mobility.     Rehab Resource Intensity Level, OT: II (Moderate Resource Intensity)

## 2025-06-20 NOTE — PHYSICAL THERAPY NOTE
"   PHYSICAL THERAPY TREATMENT NOTE  NAME:  Maximino oRwlandvy Sr.  DATE: 06/20/25    Length Of Stay: 3  Performed at least 2 patient identifiers during session: Name and ID bracelet    TREATMENT FLOWSHEET:    06/20/25 0921   PT Last Visit   PT Visit Date 06/20/25   Note Type   Note Type Treatment   Pain Assessment   Pain Assessment Tool 0-10   Pain Score No Pain   Restrictions/Precautions   Weight Bearing Precautions Per Order No   Other Precautions Cognitive;Chair Alarm;Bed Alarm;Fall Risk;Hard of hearing;Multiple lines   General   Chart Reviewed Yes   Response to Previous Treatment Patient with no complaints from previous session.   Family/Caregiver Present No   Cognition   Overall Cognitive Status Impaired   Arousal/Participation Cooperative   Attention Attends with cues to redirect   Orientation Level Oriented to person;Oriented to place;Disoriented to time;Disoriented to situation   Memory Unable to assess   Following Commands Follows one step commands with increased time or repetition   Subjective   Subjective \"we are right here so we might as well go.\"   Bed Mobility   Supine to Sit 3  Moderate assistance   Additional items Assist x 1;HOB elevated;Bedrails;Increased time required;Verbal cues;LE management   Sit to Supine 4  Minimal assistance   Additional items Assist x 1;HOB elevated;Bedrails;Increased time required;Verbal cues;LE management   Additional Comments Pt. tolerated sitting at EOB to perform BLE TE   Transfers   Sit to Stand 4  Minimal assistance   Additional items Assist x 1;Bedrails;Increased time required;Verbal cues  (RW)   Stand to Sit 3  Moderate assistance   Additional items Assist x 1;Bedrails;Increased time required;Verbal cues   Stand pivot 3  Moderate assistance   Additional items Assist x 1;Bedrails;Increased time required;Verbal cues  (RW)   Toilet transfer 3  Moderate assistance   Additional items Assist x 1;Increased time required;Verbal cues;Standard toilet  (RW grab bar) "   Additional Comments VC's provided for proper hand placement during transitions and step by step instruction for safety, direction, and technique.  Pt. with posterior lean upon stance and when turning requiring VC's to shift weight off of heels to balls of feet to help improve postural alignment. Pt. required assistance with RW management.  Pt. with tight hamstring muscles BLE causing flexed knees during mobility.  Pt. educated to strtch out BLE when in bed occationally.   Ambulation/Elevation   Gait pattern Improper Weight shift;Decreased foot clearance;Forward Flexion;Narrow NESHA;Short stride;Knees flexed;Excessively slow;Decreased hip extension;Decreased heel strike;Decreased toe off;Poor UE support  (occational scissoring)   Gait Assistance   (min-modA)   Additional items Assist x 1;Verbal cues;Tactile cues   Assistive Device Rolling walker   Distance 15 ft x2   Ambulation/Elevation Additional Comments VC's to increase NESHA by increasing space between BLE. Assistance with RW management and to ensure [id number] towards from of B feet and off heels due to Posterior lean especially when turning   Balance   Static Sitting Fair -   Dynamic Sitting Poor +   Static Standing Poor +   Dynamic Standing Poor   Ambulatory Poor -   Endurance Deficit   Endurance Deficit Yes   Activity Tolerance   Activity Tolerance Patient limited by fatigue   Nurse Made Aware RN aware   Exercises   Quad Sets Sitting;15 reps;AAROM;AROM;Bilateral   Heelslides Sitting;15 reps;AAROM;AROM;Bilateral   Glute Sets Sitting;15 reps;AAROM;AROM;Bilateral   Hip Flexion Sitting;15 reps;AAROM;AROM;Bilateral   Hip Abduction Sitting;15 reps;AAROM;AROM;Bilateral   Hip Adduction Sitting;15 reps;AAROM;AROM;Bilateral   Knee AROM Long Arc Quad Sitting;15 reps;AAROM;AROM;Bilateral   Ankle Pumps Sitting;15 reps;AAROM;AROM;Bilateral   Marching Sitting;15 reps;AAROM;AROM;Bilateral   Assessment   Prognosis Fair   Problem List Decreased strength;Decreased range of  motion;Decreased endurance;Impaired balance;Decreased mobility;Decreased safety awareness;Impaired hearing;Decreased cognition   Goals   Patient Goals to get some apple juice   PT Treatment Day 1   Plan   Treatment/Interventions ADL retraining;Functional transfer training;LE strengthening/ROM;Therapeutic exercise;Endurance training;Patient/family training;Equipment eval/education;Bed mobility;Gait training;Compensatory technique education;Spoke to nursing   Progress Progressing toward goals   PT Frequency 3-5x/wk   Discharge Recommendation   Rehab Resource Intensity Level, PT II (Moderate Resource Intensity)   AM-PAC Basic Mobility Inpatient   Turning in Flat Bed Without Bedrails 2   Lying on Back to Sitting on Edge of Flat Bed Without Bedrails 2   Moving Bed to Chair 2   Standing Up From Chair Using Arms 3   Walk in Room 2   Climb 3-5 Stairs With Railing 1   Basic Mobility Inpatient Raw Score 12   Basic Mobility Standardized Score 32.23   [facility] Highest Level Of Mobility   -[facility] Goal 4: Move to chair/commode   -[facility] Achieved 7: Walk 25 feet or more       The patient's AM-PAC Basic Mobility Inpatient Short Form Raw Score is 12. A raw score less than 16 suggests the patient may benefit from discharge to post-acute rehabilitation services. Please also refer to the recommendation of the Physical Therapist for safe discharge planning.    Pt seen for PT treatment session this date with interventions consisting of bed mobility tasks, transfer training, seated TE, gait training, toilet transfers, and education provided as needed for safety and direction to improve functional mobility, safety awareness, and activity tolerance. Pt agreeable to PT treatment session upon arrival, pt found resting in bed. At end of session, pt left in bed with bed alarm activated, SCD's applied, BLE elevated on pillow to free float B heels, and with all needs in reach. In comparison to previous session, pt with improvements in  ambulation distance, amount of assistance needed for transfers, and amount os activity  tolerated in one session. Continue to recommend Level II (Moderate Resource Intensity) at time of d/c in order to maximize pt's functional independence and safety w/ mobility. Pt continues to be functioning below baseline level. PT will continue to see pt while here in order to address the deficits listed above and provide interventions consistent w/ POC in effort to achieve STGs.    Lily Best, PTA

## 2025-06-20 NOTE — PLAN OF CARE
Problem: PAIN - ADULT  Goal: Verbalizes/displays adequate comfort level or baseline comfort level  Description: Interventions:  - Encourage patient to monitor pain and request assistance  - Assess pain using appropriate pain scale  - Administer analgesics as ordered based on type and severity of pain and evaluate response  - Implement non-pharmacological measures as appropriate and evaluate response  - Consider cultural and social influences on pain and pain management  - Notify physician/advanced practitioner if interventions unsuccessful or patient reports new pain  - Educate patient/family on pain management process including their role and importance of  reporting pain   - Provide non-pharmacologic/complimentary pain relief interventions  Outcome: Adequate for Discharge     Problem: INFECTION - ADULT  Goal: Absence or prevention of progression during hospitalization  Description: INTERVENTIONS:  - Assess and monitor for signs and symptoms of infection  - Monitor lab/diagnostic results  - Monitor all insertion sites, i.e. indwelling lines, tubes, and drains  - Monitor endotracheal if appropriate and nasal secretions for changes in amount and color  - [place] appropriate cooling/warming therapies per order  - Administer medications as ordered  - Instruct and encourage patient and family to use good hand hygiene technique  - Identify and instruct in appropriate isolation precautions for identified infection/condition  Outcome: Adequate for Discharge  Goal: Absence of fever/infection during neutropenic period  Description: INTERVENTIONS:  - Monitor WBC  - Perform strict hand hygiene  - Limit to healthy visitors only  - No plants, dried, fresh or silk flowers with moser in patient room  Outcome: Adequate for Discharge     Problem: SAFETY ADULT  Goal: Patient will remain free of falls  Description: INTERVENTIONS:  - Educate patient/family on patient safety including physical limitations  - Instruct patient to call  for assistance with activity   - Consider consulting OT/PT to assist with strengthening/mobility based on AM PAC & JH-HLM score  - Consult OT/PT to assist with strengthening/mobility   - Keep Call bell within reach  - Keep bed low and locked with side rails adjusted as appropriate  - Keep care items and personal belongings within reach  - Initiate and maintain comfort rounds  - Make Fall Risk Sign visible to staff  - Offer Toileting every 2 Hours, in advance of need  - Initiate/Maintain bed alarm  - Obtain necessary fall risk management equipment:   - Apply yellow socks and bracelet for high fall risk patients  - Consider moving patient to room near nurses station  Outcome: Adequate for Discharge  Goal: Maintain or return to baseline ADL function  Description: INTERVENTIONS:  -  Assess patient's ability to carry out ADLs; assess patient's baseline for ADL function and identify physical deficits which impact ability to perform ADLs (bathing, care of mouth/teeth, toileting, grooming, dressing, etc.)  - Assess/evaluate cause of self-care deficits   - Assess range of motion  - Assess patient's mobility; develop plan if impaired  - Assess patient's need for assistive devices and provide as appropriate  - Encourage maximum independence but intervene and supervise when necessary  - Involve family in performance of ADLs  - Assess for home care needs following discharge   - Consider OT consult to assist with ADL evaluation and planning for discharge  - Provide patient education as appropriate  - Monitor functional capacity and physical performance, use of AM PAC & JH-HLM   - Monitor gait, balance and fatigue with ambulation    Outcome: Adequate for Discharge  Goal: Maintains/Returns to pre admission functional level  Description: INTERVENTIONS:  - Perform AM-PAC 6 Click Basic Mobility/ Daily Activity assessment daily.  - Set and communicate daily mobility goal to care team and patient/family/caregiver.   - Collaborate with  rehabilitation services on mobility goals if consulted  - Perform Range of Motion 2 times a day.  - Reposition patient every 2 hours.  - Dangle patient 2 times a day  - Stand patient 2 times a day  - Ambulate patient 2 times a day  - Out of bed to chair 2 times a day   - Out of bed for meals 2 times a day  - Out of bed for toileting  - Record patient progress and toleration of activity level   Outcome: Adequate for Discharge     Problem: DISCHARGE PLANNING  Goal: Discharge to home or other facility with appropriate resources  Description: INTERVENTIONS:  - Identify barriers to discharge w/patient and caregiver  - Arrange for needed discharge resources and transportation as appropriate  - Identify discharge learning needs (meds, wound care, etc.)  - Arrange for interpretive services to assist at discharge as needed  - Refer to Case Management Department for coordinating discharge planning if the patient needs post-hospital services based on physician/advanced practitioner order or complex needs related to functional status, cognitive ability, or social support system  Outcome: Adequate for Discharge     Problem: Knowledge Deficit  Goal: Patient/family/caregiver demonstrates understanding of disease process, treatment plan, medications, and discharge instructions  Description: Complete learning assessment and assess knowledge base.  Interventions:  - Provide teaching at level of understanding  - Provide teaching via preferred learning methods  Outcome: Adequate for Discharge     Problem: Prexisting or High Potential for Compromised Skin Integrity  Goal: Skin integrity is maintained or improved  Description: INTERVENTIONS:  - Identify patients at risk for skin breakdown  - Assess and monitor skin integrity including under and around medical devices   - Assess and monitor nutrition and hydration status  - Monitor labs  - Assess for incontinence   - Turn and reposition patient  - Assist with mobility/ambulation  -  Relieve pressure over jovani prominences   - Avoid friction and shearing  - Provide appropriate hygiene as needed including keeping skin clean and dry  - Evaluate need for skin moisturizer/barrier cream  - Collaborate with interdisciplinary team  - Patient/family teaching  - Consider wound care consult    Assess:  - Review Madhu scale daily  - Clean and moisturize skin every shift  - Inspect skin when repositioning, toileting, and assisting with ADLS  - Assess extremities for adequate circulation and sensation     Bed Management:  - Have minimal linens on bed & keep smooth, unwrinkled  - Change linens as needed when moist or perspiring  - Avoid sitting or lying in one position for more than 2 hours while in bed?Keep HOB at 30degrees   - Toileting:  - Offer bedside commode  - Assess for incontinence every 2 hours  - Use incontinent care products after each incontinent episode such as cream    Activity:  - Mobilize patient 3 times a day  - Encourage activity and walks on unit  - Encourage or provide ROM exercises   - Turn and reposition patient every 2 Hours  - Use appropriate equipment to lift or move patient in bed  - Instruct/ Assist with weight shifting every 2 hours when out of bed in chair  - Consider limitation of chair time 2 hour intervals    Skin Care:  - Avoid use of baby powder, tape, friction and shearing, hot water or constrictive clothing  - Relieve pressure over bony prominences using pressure redistribution  - Do not massage red bony areas    Next Steps:  - Teach patient strategies to minimize risks such as   - Consider consults to  interdisciplinary teams such as   Outcome: Adequate for Discharge     Problem: PHYSICAL THERAPY ADULT  Goal: Performs mobility at highest level of function for planned discharge setting.  See evaluation for individualized goals.  Description: Treatment/Interventions: Functional transfer training, LE strengthening/ROM, Elevations, Therapeutic exercise, Endurance training,  Patient/family training, Cognitive reorientation, Equipment eval/education, Bed mobility, Gait training, Compensatory technique education, Spoke to nursing, OT          See flowsheet documentation for full assessment, interventions and recommendations.  Outcome: Adequate for Discharge     Problem: OCCUPATIONAL THERAPY ADULT  Goal: Performs self-care activities at highest level of function for planned discharge setting.  See evaluation for individualized goals.  Description: Treatment Interventions: ADL retraining, Functional transfer training, UE strengthening/ROM, Endurance training, Cognitive reorientation, Patient/family training, Neuromuscular reeducation, Equipment evaluation/education, Fine motor coordination activities, Compensatory technique education, Continued evaluation, Energy conservation, Activityengagement, Cardiac education          See flowsheet documentation for full assessment, interventions and recommendations.   Outcome: Adequate for Discharge     Problem: Nutrition/Hydration-ADULT  Goal: Nutrient/Hydration intake appropriate for improving, restoring or maintaining nutritional needs  Description: Monitor and assess patient's nutrition/hydration status for malnutrition. Collaborate with interdisciplinary team and initiate plan and interventions as ordered.  Monitor patient's weight and dietary intake as ordered or per policy. Utilize nutrition screening tool and intervene as necessary. Determine patient's food preferences and provide high-protein, high-caloric foods as appropriate.     INTERVENTIONS:  - Monitor oral intake, urinary output, labs, and treatment plans  - Assess nutrition and hydration status and recommend course of action  - Evaluate amount of meals eaten  - Assist patient with eating if necessary   - Allow adequate time for meals  - Recommend/ encourage appropriate diets, oral nutritional supplements, and vitamin/mineral supplements  - Order, calculate, and assess calorie counts as  needed  - Recommend, monitor, and adjust tube feedings and TPN/PPN based on assessed needs  - Assess need for intravenous fluids  - Provide specific nutrition/hydration education as appropriate  - Include patient/family/caregiver in decisions related to nutrition  Outcome: Adequate for Discharge     Problem: SAFETY,RESTRAINT: NV/NON-SELF DESTRUCTIVE BEHAVIOR  Goal: Remains free of harm/injury (restraint for non violent/non self-detsructive behavior)  Description: INTERVENTIONS:  - Instruct patient/family regarding restraint use   - Assess and monitor physiologic and psychological status   - Provide interventions and comfort measures to meet assessed patient needs   - Identify and implement measures to help patient regain control  - Assess readiness for release of restraint   Outcome: Adequate for Discharge  Goal: Returns to optimal restraint-free functioning  Description: INTERVENTIONS:  - Assess the patient's behavior and symptoms that indicate continued need for restraint  - Identify and implement measures to help patient regain control  - Assess readiness for release of restraint   Outcome: Adequate for Discharge

## 2025-06-20 NOTE — ASSESSMENT & PLAN NOTE
Malnutrition Findings:   Adult Malnutrition type: Chronic illness  Adult Degree of Malnutrition: Other severe protein calorie malnutrition  Malnutrition Characteristics: Fat loss, Muscle loss                  360 Statement: Chronic severe malnutrition related to likely inadequate kcal and PRO intake as evidenced by severe muscle loss to temporalis, pectoralis, deltoid, interroseous, quadriceps muscles; severe fat loss to buccals and triceps. Treated with: See consult note for EN recommendations in the event unable to initiate oral diet and medically appropriate/within pt's GOC. Recommend daily weights for nutrition monitoring.    BMI Findings:           Body mass index is 22 kg/m².

## 2025-06-23 NOTE — UTILIZATION REVIEW
NOTIFICATION OF ADMISSION DISCHARGE   This is a Notification of Discharge from [facility]. Please be advised that this patient has been discharge from our facility. Below you will find the admission and discharge date and time including the patient’s disposition.   UTILIZATION REVIEW CONTACT:  Utilization Review Assistants  Network Utilization Review Department  Phone: [phone] x carefully listen to the prompts. All voicemails are confidential.  Email: [email or web address]     ADMISSION INFORMATION  PRESENTATION DATE: 6/16/2025 11:49 PM  OBERVATION ADMISSION DATE: N/A  INPATIENT ADMISSION DATE: 6/17/25  2:31 AM   DISCHARGE DATE: 6/20/2025  3:47 PM   DISPOSITION:Non [facility] SNF/TCU/SNU    Network Utilization Review Department  ATTENTION: Please call with any questions or concerns to [phone] and carefully listen to the prompts so that you are directed to the right person. All voicemails are confidential.   For Discharge needs, contact Care Management DC Support Team at [phone] opt. 2  Send all requests for admission clinical reviews, approved or denied determinations and any other requests to dedicated fax number below belonging to the campus where the patient is receiving treatment. List of dedicated fax numbers for the Facilities:  FACILITY NAME UR FAX NUMBER   ADMISSION DENIALS (Administrative/Medical Necessity) [phone]   DISCHARGE SUPPORT TEAM ([facility]) [phone]   PARENT CHILD HEALTH (Maternity/NICU/Pediatrics) [phone]   [facility] [phone]   [facility] [phone]   [facility] [phone]   [facility] [phone]   [facility] [phone]   [facility] [phone]   [facility] [phone]   [facility] [phone]   [facility]  [facility] [phone]   [facility] [phone]   [facility] [phone]   [facility] [phone]

## (undated) DEVICE — SPECIMEN CONTAINER STERILE PEEL PACK

## (undated) DEVICE — SUT SILK 3-0 SH CR/8 18 IN C013D

## (undated) DEVICE — 1820 FOAM BLOCK NEEDLE COUNTER: Brand: DEVON

## (undated) DEVICE — SUT VICRYL 2-0 SH 27 IN UNDYED J417H

## (undated) DEVICE — SUT SILK 3-0 SH 30 IN K832H

## (undated) DEVICE — SPONGE LAP 18 X 18 IN

## (undated) DEVICE — DRAPE EQUIPMENT RF WAND

## (undated) DEVICE — PAD GROUNDING ADULT

## (undated) DEVICE — SYRINGE 10ML LL

## (undated) DEVICE — SUT VICRYL 3-0 SH 27 IN J416H

## (undated) DEVICE — DRAPE LAPAROTOMY W/POUCHES

## (undated) DEVICE — ASTOUND STANDARD SURGICAL GOWN, XL: Brand: CONVERTORS

## (undated) DEVICE — WOUND RETRACTOR AND PROTECTOR: Brand: ALEXIS O WOUND PROTECTOR-RETRACTOR

## (undated) DEVICE — DRESSING MEPILEX AG BORDER POST-OP 4 X 10 IN

## (undated) DEVICE — TRAY FOLEY 16FR URIMETER SILICONE SURESTEP

## (undated) DEVICE — SUT PDS II 1 XLH 96 IN LOOPED Z881G

## (undated) DEVICE — BULB SYRINGE,IRRIGATION WITH PROTECTIVE CAP: Brand: DOVER

## (undated) DEVICE — ELECTRODE BLADE MOD  E-Z CLEAN 6.5IN -0014M

## (undated) DEVICE — DRAPE TOWEL: Brand: CONVERTORS

## (undated) DEVICE — CHLORAPREP HI-LITE 26ML ORANGE

## (undated) DEVICE — BETHLEHEM UNIVERSAL MINOR GEN: Brand: CARDINAL HEALTH

## (undated) DEVICE — VIAL DECANTER

## (undated) DEVICE — SUT SILK 2-0 SH 30 IN K833H

## (undated) DEVICE — ENSEAL 20 CM SHAFT, LARGE JAW: Brand: ENSEAL X1

## (undated) DEVICE — PROXIMATE RELOADABLE LINEAR CUTTER WITH SAFETY LOCK-OUT, 75MM: Brand: PROXIMATE

## (undated) DEVICE — PROXIMATE SKIN STAPLERS (35 WIDE) CONTAINS 35 STAINLESS STEEL STAPLES (FIXED HEAD): Brand: PROXIMATE

## (undated) DEVICE — PROXIMATE RELOADABLE LINEAR STAPLER: Brand: PROXIMATE

## (undated) DEVICE — SUT PDS II 2-0 SH 27 IN Z317H

## (undated) DEVICE — PROXIMATE LINEAR CUTTER RELOAD, BLUE, 75MM: Brand: PROXIMATE

## (undated) DEVICE — TOWEL SET X-RAY

## (undated) DEVICE — TOWEL SURG XR DETECT GREEN STRL RFD

## (undated) DEVICE — GLOVE SRG BIOGEL 6

## (undated) DEVICE — STERILE LATEX POWDER-FREE SURGICAL GLOVESWITH NITRILE COATING: Brand: PROTEXIS

## (undated) DEVICE — MEDI-VAC YANK SUCT HNDL W/TPRD BULBOUS TIP: Brand: CARDINAL HEALTH

## (undated) DEVICE — 3M™ IOBAN™ 2 ANTIMICROBIAL INCISE DRAPE 6640EZ: Brand: IOBAN™ 2

## (undated) DEVICE — INTENDED FOR TISSUE SEPARATION, AND OTHER PROCEDURES THAT REQUIRE A SHARP SURGICAL BLADE TO PUNCTURE OR CUT.: Brand: BARD-PARKER SAFETY BLADES SIZE 15, STERILE

## (undated) DEVICE — GLOVE INDICATOR PI UNDERGLOVE SZ 6.5 BLUE

## (undated) DEVICE — LIGHT GLOVE GREEN

## (undated) DEVICE — NEEDLE 25G X 1 1/2

## (undated) DEVICE — DRESSING MEPILEX AG BORDER 4 X 10 IN